# Patient Record
Sex: MALE | Race: BLACK OR AFRICAN AMERICAN | NOT HISPANIC OR LATINO | Employment: OTHER | URBAN - METROPOLITAN AREA
[De-identification: names, ages, dates, MRNs, and addresses within clinical notes are randomized per-mention and may not be internally consistent; named-entity substitution may affect disease eponyms.]

---

## 2023-10-07 ENCOUNTER — HOSPITAL ENCOUNTER (EMERGENCY)
Facility: HOSPITAL | Age: 43
Discharge: HOME | End: 2023-10-07
Attending: EMERGENCY MEDICINE
Payer: COMMERCIAL

## 2023-10-07 VITALS
WEIGHT: 189.6 LBS | BODY MASS INDEX: 26.54 KG/M2 | DIASTOLIC BLOOD PRESSURE: 75 MMHG | TEMPERATURE: 98.7 F | HEART RATE: 85 BPM | HEIGHT: 71 IN | RESPIRATION RATE: 19 BRPM | OXYGEN SATURATION: 98 % | SYSTOLIC BLOOD PRESSURE: 150 MMHG

## 2023-10-07 DIAGNOSIS — F16.10 PCP (PHENCYCLIDINE) ABUSE (MULTI): Primary | ICD-10-CM

## 2023-10-07 DIAGNOSIS — F32.A DEPRESSION, UNSPECIFIED DEPRESSION TYPE: ICD-10-CM

## 2023-10-07 LAB
ALBUMIN SERPL BCP-MCNC: 4.6 G/DL (ref 3.4–5)
ALP SERPL-CCNC: 68 U/L (ref 33–120)
ALT SERPL W P-5'-P-CCNC: 15 U/L (ref 10–52)
AMPHETAMINES UR QL SCN: ABNORMAL
ANION GAP SERPL CALC-SCNC: 12 MMOL/L (ref 10–20)
APAP SERPL-MCNC: <10 UG/ML
AST SERPL W P-5'-P-CCNC: 23 U/L (ref 9–39)
BARBITURATES UR QL SCN: ABNORMAL
BASOPHILS # BLD AUTO: 0.04 X10*3/UL (ref 0–0.1)
BASOPHILS NFR BLD AUTO: 0.7 %
BENZODIAZ UR QL SCN: ABNORMAL
BILIRUB SERPL-MCNC: 0.6 MG/DL (ref 0–1.2)
BUN SERPL-MCNC: 10 MG/DL (ref 6–23)
BZE UR QL SCN: ABNORMAL
CALCIUM SERPL-MCNC: 9.9 MG/DL (ref 8.6–10.6)
CANNABINOIDS UR QL SCN: ABNORMAL
CHLORIDE SERPL-SCNC: 102 MMOL/L (ref 98–107)
CO2 SERPL-SCNC: 28 MMOL/L (ref 21–32)
CREAT SERPL-MCNC: 1.2 MG/DL (ref 0.5–1.3)
EOSINOPHIL # BLD AUTO: 0.08 X10*3/UL (ref 0–0.7)
EOSINOPHIL NFR BLD AUTO: 1.5 %
ERYTHROCYTE [DISTWIDTH] IN BLOOD BY AUTOMATED COUNT: 12.4 % (ref 11.5–14.5)
ETHANOL SERPL-MCNC: <10 MG/DL
FENTANYL+NORFENTANYL UR QL SCN: ABNORMAL
GFR SERPL CREATININE-BSD FRML MDRD: 77 ML/MIN/1.73M*2
GLUCOSE SERPL-MCNC: 127 MG/DL (ref 74–99)
HCT VFR BLD AUTO: 43.9 % (ref 41–52)
HGB BLD-MCNC: 15.3 G/DL (ref 13.5–17.5)
IMM GRANULOCYTES # BLD AUTO: 0 X10*3/UL (ref 0–0.7)
IMM GRANULOCYTES NFR BLD AUTO: 0 % (ref 0–0.9)
LYMPHOCYTES # BLD AUTO: 2.69 X10*3/UL (ref 1.2–4.8)
LYMPHOCYTES NFR BLD AUTO: 50 %
MCH RBC QN AUTO: 31.9 PG (ref 26–34)
MCHC RBC AUTO-ENTMCNC: 34.9 G/DL (ref 32–36)
MCV RBC AUTO: 92 FL (ref 80–100)
MONOCYTES # BLD AUTO: 0.39 X10*3/UL (ref 0.1–1)
MONOCYTES NFR BLD AUTO: 7.2 %
NEUTROPHILS # BLD AUTO: 2.18 X10*3/UL (ref 1.2–7.7)
NEUTROPHILS NFR BLD AUTO: 40.6 %
NRBC BLD-RTO: 0 /100 WBCS (ref 0–0)
OPIATES UR QL SCN: ABNORMAL
OXYCODONE+OXYMORPHONE UR QL SCN: ABNORMAL
PCP UR QL SCN: ABNORMAL
PLATELET # BLD AUTO: 266 X10*3/UL (ref 150–450)
PMV BLD AUTO: 9.9 FL (ref 7.5–11.5)
POTASSIUM SERPL-SCNC: 4.4 MMOL/L (ref 3.5–5.3)
PROT SERPL-MCNC: 7.8 G/DL (ref 6.4–8.2)
RBC # BLD AUTO: 4.79 X10*6/UL (ref 4.5–5.9)
SALICYLATES SERPL-MCNC: <3 MG/DL
SARS-COV-2 RNA RESP QL NAA+PROBE: NOT DETECTED
SODIUM SERPL-SCNC: 138 MMOL/L (ref 136–145)
WBC # BLD AUTO: 5.4 X10*3/UL (ref 4.4–11.3)

## 2023-10-07 PROCEDURE — 80053 COMPREHEN METABOLIC PANEL: CPT | Performed by: PHYSICIAN ASSISTANT

## 2023-10-07 PROCEDURE — 99284 EMERGENCY DEPT VISIT MOD MDM: CPT | Performed by: EMERGENCY MEDICINE

## 2023-10-07 PROCEDURE — 80307 DRUG TEST PRSMV CHEM ANLYZR: CPT | Performed by: PHYSICIAN ASSISTANT

## 2023-10-07 PROCEDURE — 99283 EMERGENCY DEPT VISIT LOW MDM: CPT

## 2023-10-07 PROCEDURE — 87635 SARS-COV-2 COVID-19 AMP PRB: CPT | Performed by: EMERGENCY MEDICINE

## 2023-10-07 PROCEDURE — 36415 COLL VENOUS BLD VENIPUNCTURE: CPT | Performed by: PHYSICIAN ASSISTANT

## 2023-10-07 PROCEDURE — 80320 DRUG SCREEN QUANTALCOHOLS: CPT | Performed by: PHYSICIAN ASSISTANT

## 2023-10-07 PROCEDURE — 85025 COMPLETE CBC W/AUTO DIFF WBC: CPT | Performed by: PHYSICIAN ASSISTANT

## 2023-10-07 PROCEDURE — 80349 CANNABINOIDS NATURAL: CPT | Performed by: PHYSICIAN ASSISTANT

## 2023-10-07 PROCEDURE — 99285 EMERGENCY DEPT VISIT HI MDM: CPT | Performed by: PHYSICIAN ASSISTANT

## 2023-10-07 PROCEDURE — 83992 ASSAY FOR PHENCYCLIDINE: CPT | Performed by: PHYSICIAN ASSISTANT

## 2023-10-07 PROCEDURE — 2500000001 HC RX 250 WO HCPCS SELF ADMINISTERED DRUGS (ALT 637 FOR MEDICARE OP): Mod: SE | Performed by: PHYSICIAN ASSISTANT

## 2023-10-07 RX ORDER — LORAZEPAM 0.5 MG/1
0.5 TABLET ORAL EVERY 2 HOUR PRN
Status: DISCONTINUED | OUTPATIENT
Start: 2023-10-07 | End: 2023-10-07 | Stop reason: HOSPADM

## 2023-10-07 RX ORDER — FOLIC ACID 1 MG/1
1 TABLET ORAL DAILY
Status: DISCONTINUED | OUTPATIENT
Start: 2023-10-07 | End: 2023-10-07 | Stop reason: HOSPADM

## 2023-10-07 RX ORDER — MULTIVIT-MIN/IRON FUM/FOLIC AC 7.5 MG-4
1 TABLET ORAL DAILY
Status: DISCONTINUED | OUTPATIENT
Start: 2023-10-07 | End: 2023-10-07 | Stop reason: HOSPADM

## 2023-10-07 RX ORDER — LORAZEPAM 0.5 MG/1
1 TABLET ORAL EVERY 2 HOUR PRN
Status: DISCONTINUED | OUTPATIENT
Start: 2023-10-07 | End: 2023-10-07 | Stop reason: HOSPADM

## 2023-10-07 RX ORDER — LORAZEPAM 0.5 MG/1
2 TABLET ORAL EVERY 2 HOUR PRN
Status: DISCONTINUED | OUTPATIENT
Start: 2023-10-07 | End: 2023-10-07 | Stop reason: HOSPADM

## 2023-10-07 RX ADMIN — Medication 1 TABLET: at 18:24

## 2023-10-07 RX ADMIN — FOLIC ACID 1 MG: 1 TABLET ORAL at 18:25

## 2023-10-07 SDOH — HEALTH STABILITY: MENTAL HEALTH: SUICIDAL BEHAVIOR (DESCRIPTION): PATIENT SAID HE IS GOING TO JUMP IN THE LAKE

## 2023-10-07 SDOH — HEALTH STABILITY: MENTAL HEALTH: DEPRESSION SYMPTOMS: NO PROBLEMS REPORTED OR OBSERVED.

## 2023-10-07 SDOH — HEALTH STABILITY: MENTAL HEALTH: ANXIETY SYMPTOMS: NO PROBLEMS REPORTED OR OBSERVED.

## 2023-10-07 SDOH — HEALTH STABILITY: MENTAL HEALTH: IN THE PAST FEW WEEKS, HAVE YOU FELT THAT YOU OR YOUR FAMILY WOULD BE BETTER OFF IF YOU WERE DEAD?: YES

## 2023-10-07 SDOH — HEALTH STABILITY: MENTAL HEALTH: ACTIVE SUICIDAL IDEATION WITH SPECIFIC PLAN AND INTENT (PAST 1 MONTH): YES

## 2023-10-07 SDOH — HEALTH STABILITY: MENTAL HEALTH: SUICIDAL BEHAVIOR (LIFETIME): YES

## 2023-10-07 SDOH — HEALTH STABILITY: MENTAL HEALTH: HAVE YOU HAD THESE THOUGHTS AND HAD SOME INTENTION OF ACTING ON THEM?: YES

## 2023-10-07 SDOH — HEALTH STABILITY: MENTAL HEALTH: HAVE YOU ACTUALLY HAD ANY THOUGHTS OF KILLING YOURSELF?: YES

## 2023-10-07 SDOH — HEALTH STABILITY: MENTAL HEALTH: ACTIVE SUICIDAL IDEATION WITH SOME INTENT TO ACT, WITHOUT SPECIFIC PLAN (PAST 1 MONTH): YES

## 2023-10-07 SDOH — HEALTH STABILITY: MENTAL HEALTH: ARE YOU HAVING THOUGHTS OF KILLING YOURSELF RIGHT NOW?: YES

## 2023-10-07 SDOH — HEALTH STABILITY: MENTAL HEALTH: HAVE YOU WISHED YOU WERE DEAD OR WISHED YOU COULD GO TO SLEEP AND NOT WAKE UP?: YES

## 2023-10-07 SDOH — HEALTH STABILITY: MENTAL HEALTH: DESCRIBE YOUR THOUGHTS OF KILLING YOURSELF RIGHT NOW:: "I'M GOING TO JUMP IN THE LAKE"

## 2023-10-07 SDOH — HEALTH STABILITY: MENTAL HEALTH: IN THE PAST WEEK, HAVE YOU BEEN HAVING THOUGHTS ABOUT KILLING YOURSELF?: YES

## 2023-10-07 SDOH — HEALTH STABILITY: MENTAL HEALTH: HAVE YOU EVER TRIED TO KILL YOURSELF?: YES

## 2023-10-07 SDOH — HEALTH STABILITY: MENTAL HEALTH: SUICIDE ASSESSMENT: ADULT (C-SSRS)

## 2023-10-07 SDOH — HEALTH STABILITY: MENTAL HEALTH: WHEN DID YOU TRY TO KILL YOURSELF?: DID NOT SAY

## 2023-10-07 SDOH — HEALTH STABILITY: MENTAL HEALTH: NON-SPECIFIC ACTIVE SUICIDAL THOUGHTS (PAST 1 MONTH): YES

## 2023-10-07 SDOH — HEALTH STABILITY: MENTAL HEALTH
HAVE YOU STARTED TO WORK OUT OR WORKED OUT THE DETAILS OF HOW TO KILL YOURSELF? DO YOU INTENT TO CARRY OUT THIS PLAN?: NO

## 2023-10-07 SDOH — HEALTH STABILITY: MENTAL HEALTH: HALLUCINATION: AUDITORY

## 2023-10-07 SDOH — HEALTH STABILITY: MENTAL HEALTH: HAVE YOU EVER DONE ANYTHING, STARTED TO DO ANYTHING, OR PREPARED TO DO ANYTHING TO END YOUR LIFE?: NO

## 2023-10-07 SDOH — HEALTH STABILITY: MENTAL HEALTH: WISH TO BE DEAD (PAST 1 MONTH): YES

## 2023-10-07 SDOH — ECONOMIC STABILITY: HOUSING INSECURITY: FEELS SAFE LIVING IN HOME: NO

## 2023-10-07 SDOH — HEALTH STABILITY: MENTAL HEALTH: HAVE YOU BEEN THINKING ABOUT HOW YOU MIGHT DO THIS?: YES

## 2023-10-07 SDOH — HEALTH STABILITY: MENTAL HEALTH: IN THE PAST FEW WEEKS, HAVE YOU WISHED YOU WERE DEAD?: YES

## 2023-10-07 SDOH — HEALTH STABILITY: MENTAL HEALTH: SUICIDAL BEHAVIOR (3 MONTHS): YES

## 2023-10-07 SDOH — HEALTH STABILITY: MENTAL HEALTH: HOW DID YOU TRY TO KILL YOURSELF?: DID NOT SAY

## 2023-10-07 ASSESSMENT — LIFESTYLE VARIABLES
TOTAL SCORE: 0
EVER HAD A DRINK FIRST THING IN THE MORNING TO STEADY YOUR NERVES TO GET RID OF A HANGOVER: YES
TREMOR: 2
ORIENTATION AND CLOUDING OF SENSORIUM: ORIENTED AND CAN DO SERIAL ADDITIONS
NAUSEA AND VOMITING: NO NAUSEA AND NO VOMITING
HEADACHE, FULLNESS IN HEAD: NOT PRESENT
PAROXYSMAL SWEATS: NO SWEAT VISIBLE
VISUAL DISTURBANCES: NOT PRESENT
HAVE YOU EVER FELT YOU SHOULD CUT DOWN ON YOUR DRINKING: YES
PRESCIPTION_ABUSE_PAST_12_MONTHS: YES
EVER FELT BAD OR GUILTY ABOUT YOUR DRINKING: YES
HAVE PEOPLE ANNOYED YOU BY CRITICIZING YOUR DRINKING: YES
ORIENTATION AND CLOUDING OF SENSORIUM: ORIENTED AND CAN DO SERIAL ADDITIONS
ANXIETY: NO ANXIETY, AT EASE
AUDITORY DISTURBANCES: NOT PRESENT
SUBSTANCE_ABUSE_PAST_12_MONTHS: YES
HEADACHE, FULLNESS IN HEAD: NOT PRESENT
AGITATION: NORMAL ACTIVITY
NAUSEA AND VOMITING: NO NAUSEA AND NO VOMITING
AGITATION: NORMAL ACTIVITY
TOTAL SCORE: 3
ANXIETY: NO ANXIETY, AT EASE
TREMOR: NO TREMOR
PAROXYSMAL SWEATS: NO SWEAT VISIBLE
AUDITORY DISTURBANCES: VERY MILD HARSHNESS OR ABILITY TO FRIGHTEN
VISUAL DISTURBANCES: NOT PRESENT

## 2023-10-07 ASSESSMENT — COLUMBIA-SUICIDE SEVERITY RATING SCALE - C-SSRS
1. IN THE PAST MONTH, HAVE YOU WISHED YOU WERE DEAD OR WISHED YOU COULD GO TO SLEEP AND NOT WAKE UP?: YES
5. HAVE YOU STARTED TO WORK OUT OR WORKED OUT THE DETAILS OF HOW TO KILL YOURSELF? DO YOU INTEND TO CARRY OUT THIS PLAN?: YES
4. HAVE YOU HAD THESE THOUGHTS AND HAD SOME INTENTION OF ACTING ON THEM?: YES
2. HAVE YOU ACTUALLY HAD ANY THOUGHTS OF KILLING YOURSELF?: YES
6. HAVE YOU EVER DONE ANYTHING, STARTED TO DO ANYTHING, OR PREPARED TO DO ANYTHING TO END YOUR LIFE?: YES
6. HAVE YOU EVER DONE ANYTHING, STARTED TO DO ANYTHING, OR PREPARED TO DO ANYTHING TO END YOUR LIFE?: YES

## 2023-10-07 ASSESSMENT — PAIN - FUNCTIONAL ASSESSMENT: PAIN_FUNCTIONAL_ASSESSMENT: 0-10

## 2023-10-07 ASSESSMENT — PAIN SCALES - GENERAL: PAINLEVEL_OUTOF10: 0 - NO PAIN
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
## 2023-10-15 LAB
CARBOXYTHC UR-MCNC: 137 NG/ML
PCP UR-MCNC: >1000 NG/ML

## 2023-10-29 ENCOUNTER — HOSPITAL ENCOUNTER (EMERGENCY)
Facility: HOSPITAL | Age: 43
Discharge: LEFT BEFORE DISCHARGE | End: 2023-10-29

## 2023-10-29 PROCEDURE — 4500999001 HC ED NO CHARGE

## 2024-01-01 ENCOUNTER — HOSPITAL ENCOUNTER (EMERGENCY)
Facility: HOSPITAL | Age: 44
Discharge: HOME | End: 2024-01-01
Attending: EMERGENCY MEDICINE
Payer: COMMERCIAL

## 2024-01-01 VITALS
BODY MASS INDEX: 27.3 KG/M2 | DIASTOLIC BLOOD PRESSURE: 78 MMHG | WEIGHT: 195 LBS | RESPIRATION RATE: 16 BRPM | SYSTOLIC BLOOD PRESSURE: 122 MMHG | TEMPERATURE: 98.3 F | HEART RATE: 80 BPM | OXYGEN SATURATION: 99 % | HEIGHT: 71 IN

## 2024-01-01 DIAGNOSIS — F16.20: Primary | ICD-10-CM

## 2024-01-01 LAB
ALBUMIN SERPL BCP-MCNC: 4.5 G/DL (ref 3.4–5)
ALP SERPL-CCNC: 73 U/L (ref 33–120)
ALT SERPL W P-5'-P-CCNC: 19 U/L (ref 10–52)
AMPHETAMINES UR QL SCN: ABNORMAL
ANION GAP SERPL CALC-SCNC: 12 MMOL/L (ref 10–20)
APAP SERPL-MCNC: <10 UG/ML
AST SERPL W P-5'-P-CCNC: 26 U/L (ref 9–39)
BARBITURATES UR QL SCN: ABNORMAL
BASOPHILS # BLD AUTO: 0.03 X10*3/UL (ref 0–0.1)
BASOPHILS NFR BLD AUTO: 0.5 %
BENZODIAZ UR QL SCN: ABNORMAL
BILIRUB SERPL-MCNC: 0.7 MG/DL (ref 0–1.2)
BUN SERPL-MCNC: 15 MG/DL (ref 6–23)
BZE UR QL SCN: ABNORMAL
CALCIUM SERPL-MCNC: 9.9 MG/DL (ref 8.6–10.6)
CANNABINOIDS UR QL SCN: ABNORMAL
CHLORIDE SERPL-SCNC: 104 MMOL/L (ref 98–107)
CO2 SERPL-SCNC: 26 MMOL/L (ref 21–32)
CREAT SERPL-MCNC: 1.2 MG/DL (ref 0.5–1.3)
EOSINOPHIL # BLD AUTO: 0.06 X10*3/UL (ref 0–0.7)
EOSINOPHIL NFR BLD AUTO: 1.1 %
ERYTHROCYTE [DISTWIDTH] IN BLOOD BY AUTOMATED COUNT: 12.9 % (ref 11.5–14.5)
ETHANOL SERPL-MCNC: <10 MG/DL
FENTANYL+NORFENTANYL UR QL SCN: ABNORMAL
GFR SERPL CREATININE-BSD FRML MDRD: 77 ML/MIN/1.73M*2
GLUCOSE SERPL-MCNC: 80 MG/DL (ref 74–99)
HCT VFR BLD AUTO: 44.4 % (ref 41–52)
HGB BLD-MCNC: 16 G/DL (ref 13.5–17.5)
IMM GRANULOCYTES # BLD AUTO: 0.01 X10*3/UL (ref 0–0.7)
IMM GRANULOCYTES NFR BLD AUTO: 0.2 % (ref 0–0.9)
LYMPHOCYTES # BLD AUTO: 2.74 X10*3/UL (ref 1.2–4.8)
LYMPHOCYTES NFR BLD AUTO: 49.2 %
MCH RBC QN AUTO: 31.4 PG (ref 26–34)
MCHC RBC AUTO-ENTMCNC: 36 G/DL (ref 32–36)
MCV RBC AUTO: 87 FL (ref 80–100)
MONOCYTES # BLD AUTO: 0.52 X10*3/UL (ref 0.1–1)
MONOCYTES NFR BLD AUTO: 9.3 %
NEUTROPHILS # BLD AUTO: 2.21 X10*3/UL (ref 1.2–7.7)
NEUTROPHILS NFR BLD AUTO: 39.7 %
NRBC BLD-RTO: 0 /100 WBCS (ref 0–0)
OPIATES UR QL SCN: ABNORMAL
OXYCODONE+OXYMORPHONE UR QL SCN: ABNORMAL
PCP UR QL SCN: ABNORMAL
PLATELET # BLD AUTO: 271 X10*3/UL (ref 150–450)
POTASSIUM SERPL-SCNC: 4 MMOL/L (ref 3.5–5.3)
PROT SERPL-MCNC: 7.5 G/DL (ref 6.4–8.2)
RBC # BLD AUTO: 5.09 X10*6/UL (ref 4.5–5.9)
SALICYLATES SERPL-MCNC: <3 MG/DL
SARS-COV-2 RNA RESP QL NAA+PROBE: NOT DETECTED
SODIUM SERPL-SCNC: 138 MMOL/L (ref 136–145)
WBC # BLD AUTO: 5.6 X10*3/UL (ref 4.4–11.3)

## 2024-01-01 PROCEDURE — 99285 EMERGENCY DEPT VISIT HI MDM: CPT | Mod: 25

## 2024-01-01 PROCEDURE — 80179 DRUG ASSAY SALICYLATE: CPT | Performed by: EMERGENCY MEDICINE

## 2024-01-01 PROCEDURE — 80053 COMPREHEN METABOLIC PANEL: CPT | Performed by: EMERGENCY MEDICINE

## 2024-01-01 PROCEDURE — 99284 EMERGENCY DEPT VISIT MOD MDM: CPT | Mod: 25

## 2024-01-01 PROCEDURE — 87635 SARS-COV-2 COVID-19 AMP PRB: CPT

## 2024-01-01 PROCEDURE — 99285 EMERGENCY DEPT VISIT HI MDM: CPT | Performed by: EMERGENCY MEDICINE

## 2024-01-01 PROCEDURE — 36415 COLL VENOUS BLD VENIPUNCTURE: CPT | Performed by: EMERGENCY MEDICINE

## 2024-01-01 PROCEDURE — 85025 COMPLETE CBC W/AUTO DIFF WBC: CPT | Performed by: EMERGENCY MEDICINE

## 2024-01-01 PROCEDURE — 80307 DRUG TEST PRSMV CHEM ANLYZR: CPT | Performed by: EMERGENCY MEDICINE

## 2024-01-01 PROCEDURE — 93010 ELECTROCARDIOGRAM REPORT: CPT | Performed by: PHYSICIAN ASSISTANT

## 2024-01-01 SDOH — HEALTH STABILITY: MENTAL HEALTH: WHEN DID YOU TRY TO KILL YOURSELF?: "YEARS AGO" PER PATIENT REPORT.

## 2024-01-01 SDOH — HEALTH STABILITY: MENTAL HEALTH: ACTIVE SUICIDAL IDEATION WITH SOME INTENT TO ACT, WITHOUT SPECIFIC PLAN (PAST 1 MONTH): NO

## 2024-01-01 SDOH — HEALTH STABILITY: MENTAL HEALTH: DEPRESSION SYMPTOMS: CRYING

## 2024-01-01 SDOH — HEALTH STABILITY: MENTAL HEALTH: WISH TO BE DEAD (PAST 1 MONTH): YES

## 2024-01-01 SDOH — HEALTH STABILITY: MENTAL HEALTH: ARE YOU HAVING THOUGHTS OF KILLING YOURSELF RIGHT NOW?: NO

## 2024-01-01 SDOH — HEALTH STABILITY: MENTAL HEALTH: ANXIETY SYMPTOMS: GENERALIZED

## 2024-01-01 SDOH — HEALTH STABILITY: MENTAL HEALTH: SUICIDAL BEHAVIOR (3 MONTHS): NO

## 2024-01-01 SDOH — HEALTH STABILITY: MENTAL HEALTH: SUICIDAL BEHAVIOR (DESCRIPTION): HANGING AND CUTTING WRISTS

## 2024-01-01 SDOH — HEALTH STABILITY: MENTAL HEALTH: IN THE PAST FEW WEEKS, HAVE YOU WISHED YOU WERE DEAD?: YES

## 2024-01-01 SDOH — HEALTH STABILITY: MENTAL HEALTH: IN THE PAST FEW WEEKS, HAVE YOU FELT THAT YOU OR YOUR FAMILY WOULD BE BETTER OFF IF YOU WERE DEAD?: NO

## 2024-01-01 SDOH — HEALTH STABILITY: MENTAL HEALTH: SLEEP PATTERN: DIFFICULTY FALLING ASLEEP

## 2024-01-01 SDOH — HEALTH STABILITY: MENTAL HEALTH: HOW DID YOU TRY TO KILL YOURSELF?: CUTTING WRISTS AND HANGING

## 2024-01-01 SDOH — HEALTH STABILITY: MENTAL HEALTH: IN THE PAST WEEK, HAVE YOU BEEN HAVING THOUGHTS ABOUT KILLING YOURSELF?: YES

## 2024-01-01 SDOH — HEALTH STABILITY: MENTAL HEALTH: BEHAVIORS/MOOD: ANXIOUS;RESTLESS;TEARFUL

## 2024-01-01 SDOH — HEALTH STABILITY: MENTAL HEALTH: HAVE YOU EVER TRIED TO KILL YOURSELF?: YES

## 2024-01-01 SDOH — ECONOMIC STABILITY: GENERAL: FINANCIAL CONCERNS: UNABLE TO AFFORD FOOD;TRANSPORTATION COSTS

## 2024-01-01 SDOH — HEALTH STABILITY: MENTAL HEALTH: NON-SPECIFIC ACTIVE SUICIDAL THOUGHTS (PAST 1 MONTH): YES

## 2024-01-01 SDOH — ECONOMIC STABILITY: HOUSING INSECURITY: FEELS SAFE LIVING IN HOME: NO

## 2024-01-01 SDOH — HEALTH STABILITY: MENTAL HEALTH: ACTIVE SUICIDAL IDEATION WITH SPECIFIC PLAN AND INTENT (PAST 1 MONTH): NO

## 2024-01-01 SDOH — HEALTH STABILITY: MENTAL HEALTH: SUICIDAL BEHAVIOR (LIFETIME): YES

## 2024-01-01 SDOH — HEALTH STABILITY: MENTAL HEALTH: NEEDS EXPRESSED: DENIES

## 2024-01-01 SDOH — HEALTH STABILITY: MENTAL HEALTH: HALLUCINATION: AUDITORY

## 2024-01-01 ASSESSMENT — COLUMBIA-SUICIDE SEVERITY RATING SCALE - C-SSRS
6. HAVE YOU EVER DONE ANYTHING, STARTED TO DO ANYTHING, OR PREPARED TO DO ANYTHING TO END YOUR LIFE?: YES
6. HAVE YOU EVER DONE ANYTHING, STARTED TO DO ANYTHING, OR PREPARED TO DO ANYTHING TO END YOUR LIFE?: NO
1. IN THE PAST MONTH, HAVE YOU WISHED YOU WERE DEAD OR WISHED YOU COULD GO TO SLEEP AND NOT WAKE UP?: YES
4. HAVE YOU HAD THESE THOUGHTS AND HAD SOME INTENTION OF ACTING ON THEM?: YES
2. HAVE YOU ACTUALLY HAD ANY THOUGHTS OF KILLING YOURSELF?: YES
5. HAVE YOU STARTED TO WORK OUT OR WORKED OUT THE DETAILS OF HOW TO KILL YOURSELF? DO YOU INTEND TO CARRY OUT THIS PLAN?: YES

## 2024-01-01 ASSESSMENT — PAIN - FUNCTIONAL ASSESSMENT
PAIN_FUNCTIONAL_ASSESSMENT: 0-10
PAIN_FUNCTIONAL_ASSESSMENT: 0-10

## 2024-01-01 ASSESSMENT — LIFESTYLE VARIABLES
EVER FELT BAD OR GUILTY ABOUT YOUR DRINKING: NO
PRESCIPTION_ABUSE_PAST_12_MONTHS: NO
EVER HAD A DRINK FIRST THING IN THE MORNING TO STEADY YOUR NERVES TO GET RID OF A HANGOVER: NO
HAVE YOU EVER FELT YOU SHOULD CUT DOWN ON YOUR DRINKING: NO
SUBSTANCE_ABUSE_PAST_12_MONTHS: YES
REASON UNABLE TO ASSESS: NO
HAVE PEOPLE ANNOYED YOU BY CRITICIZING YOUR DRINKING: NO

## 2024-01-01 ASSESSMENT — PAIN SCALES - GENERAL
PAINLEVEL_OUTOF10: 0 - NO PAIN
PAINLEVEL_OUTOF10: 0 - NO PAIN
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
## 2024-01-02 ENCOUNTER — CLINICAL SUPPORT (OUTPATIENT)
Dept: EMERGENCY MEDICINE | Facility: HOSPITAL | Age: 44
End: 2024-01-02
Payer: COMMERCIAL

## 2024-01-02 PROCEDURE — 93005 ELECTROCARDIOGRAM TRACING: CPT

## 2024-01-02 NOTE — PROGRESS NOTES
EPAT - Social Work Psychiatric Assessment    Arrival Details  Mode of Arrival: Ambulatory  Admission Source: Home  Admission Type: Voluntary  EPAT Assessment Start Date: 01/01/24  EPAT Assessment Start Time: 1610  Name of : Lauryn Childs [facility]    History of Present Illness  Admission Reason: Psychiatric Evaluation  HPI: Patient, Shamar Cunningham, is a 43 year old male with history of schizophrenia, borderline personality disorder, anxiety, and PCP abuse. Patient presented to ED with complaint of suicidal ideation, auditory hallucinations, and paranoia. Patient reported auditory hallucinations were causing increased distress and increasing suicidal ideation with no plan. Patient appeared disorganized but redirect able in ED and tearful, per provider note. Patient reportedly requesting to go to [facility] when talking to provider. Patient denied experiencing homicidal ideation.     Patient’s chart, community record, provider note, triage note, labs, and C-SSRS score reviewed. Patient’s chart shows history of multiple EPAT assessments, inpatient psychiatric hospitalizations, and mental health diagnoses. Patient’s most recent EPAT assessment noted in October of 2023 with recommendation for discharge to [facility]. Patient’s most recent inpatient psychiatric hospitalization noted at [facility] from 08/17-08/22/2023 for suicidal ideation. Patient’s C-SSRS scored at “high risk” in triage.     Patient’s mother, Ann Cunningham ([phone]), contacted unsuccessfully. Phone number disconnected per messaging on call.    SW Readmission Information   Readmission within 30 Days: Yes  Previous ED Visit Date and Reason : 12/10/2023, Chest pain  Previous Discharge Date and Location: 12/10/2023, [facility]  Factors Contributing to  Readmission Inpatient/ED (Team Perspective): Homeless, Med Compliance/Difficulty Obtaining, Transportation Issues, Substance Abuse  Readmission Factors Team Comments: Chronic mental health issues, substance  use, medication/treatment non-compliance  Factors Contributing to Readmission (Patient/Family Perspective): Chronic mental health issues, substance use, and medication/treatment non-compliance.    Psychiatric Symptoms  Anxiety Symptoms: Generalized  Depression Symptoms: Crying  Rabia Symptoms: Flight of ideas, Poor judgment, Labile    Psychosis Symptoms  Hallucination Type: Auditory  Delusion Type: Paranoid    Additional Symptoms - Adult  Generalized Anxiety Disorder: Restlessness, Irritability, Excessive anxiety/worry  Obsessive Compulsive Disorder: No problems reported or observed.  Panic Attack: No problems reported or observed.  Post Traumatic Stress Disorder: No problems reported or observed.  Delirium: No problems reported or observed.  Review of Symptoms Comments: Patient discussed increased depression symptoms including suicidal thinking. Patient's chart records show history of patient having suicidal ideation at baseline. Patient reported history of suicide attempts years prior to current ED visit. Patient reported experiencing hallucinations telling patient to harm self, which seem present at baseline per chart history. Patient denied homicidal ideation.    Past Psychiatric History/Meds/Treatments  Past Psychiatric History: Kary has history of schizophrenia, borderline personality disorder, anxiety, and PCP abuse.  Past Psychiatric Meds/Treatments: Patient has history of treatment through [facility], per patient report. Patient reported history of use of Zyprexa, Seroquel, Buspar, Trazodone, and Prozac. Patient discussed medication non-compliance over the last few weeks. Per community records, patient reportedly compliant with medications as of 12/11/2023. Patient has history of multiple inpatient psychiatric hospitalizations with most recent noted at [facility] from 08/17-08/22/2023.  Past Violence/Victimization History: Patient's chart has violence risk indicator.    Current Mental Health  Contacts   Name/Phone Number: Unreported, previously through [facility].   Last Appointment Date: Unreported  Provider Name/Phone Number: Unreported, previously through [facility].  Provider Last Appointment Date: Unreported    Support System: Community, Friends, Extended family    Living Arrangement: Apartment    Home Safety  Feels Safe Living in Home: No  Potentially Unsafe Housing Conditions: Unable to Assess  Home Safety : Patient reported feeling paranoid about people coming into apartment and trying to kill patient due to friend and brother's death. Reports of friends/brother's deaths noted in several charts, unclear if thoughts are based in facts or paranoid delusion.    Income Information  Employment Status for: Patient  Employment Status: Disabled  Income Source: Disability  Current/Previous Occupation: Unable to Assess  Shift Worked:  (Unable to assess)  Income/Expense Information: Income meets expenses  Financial Concerns: Unable to Afford Food, Transportation Costs  Who Manages Finances if Patient Unable: Unreported  Employment/ Finance Comments: Patient reportedly utilizes disability income for needs.    [place] Service/Education History  Current or Previous  Service: None   Experience: Other (Comment) (None reported)  Education Level: High school  History of Learning Problems: No  History of School Behavior Problems: No  School History: Patient reported completing high school.    Social/Cultural History  Social History: Patient is a 43 year old  male with brown skin, brown hair, tattoos, wearing hospital gear. Patient appeared moderately groomed and close to stated age.  Cultural Requests During Hospitalization: None reported  Spiritual Requests During Hospitalization: None reported  Important Activities: Social    Legal  Legal Considerations: Patient/ Family Ability to Make Healthcare Decisions  Assistance with Managing/Advocating Healthcare Needs:  Other (Comment) (None reported)  Criminal Activity/ Legal Involvement Pertinent to Current Situation/ Hospitalization: None reported  Legal Concerns: None reported  Legal Comments: None reported    Drug Screening  Have you used any substances (canabis, cocaine, heroin, hallucinogens, inhalants, etc.) in the past 12 months?: Yes  Have you used any prescription drugs other than prescribed in the past 12 months?: No  Is a toxicology screen needed?: Yes    Stage of Change  Stage of Change: Precontemplation  History of Treatment: Inpatient, Dual, AA/NA meetings, Sober living  Type of Treatment Offered: AA/NA meeting resource, Other (Comment) ([facility])  Treatment Offered: Accepted  Duration of Substance Use: Unreported  Frequency of Substance Use: Unreported  Age of First Substance Use: Unreported    Psychosocial  Psychosocial (WDL): Exceptions to WDL  Behaviors/Mood: Calm, Flight of ideas, Labile, Pleasant, Verbal  Affect: Inconsistent with thought content  Parent/Guardian/Significant Other Involvement: No involvement  Family Behaviors: Unable to assess  Visitor Behaviors: Unable to assess  Needs Expressed: Denies  Emotional Support Given:  (None provided)    Orientation  Orientation Level: Oriented X4    General Appearance  Motor Activity: Unremarkable  Speech Pattern: Repetitive  General Attitude: Cooperative, Pleasant  Appearance/Hygiene: Poor hygiene    Thought Process  Coherency: Circumstantial  Content: Blaming others, Delusions  Delusions: Paranoid  Perception: Hallucinations  Hallucination: Auditory  Judgment/Insight: Limited  Confusion: None  Cognition: Impulsive, Poor judgement, Poor safety awareness    Sleep Pattern  Sleep Pattern: Unable to assess    Risk Factors  Self Harm/Suicidal Ideation Plan: Patient reported suicidal ideation with auditory hallucinations telling patient to jump in front of a bus. Patient's chart history notes baseline suicidal ideation.  Previous Self Harm/Suicidal Plans: Patient  "discussed remote history of suicide attempts via cutting wrists and hanging.  Risk Factors: Lower socioeconomic status, Major mental illness, Male  Description of Thoughts/Ideas Leaving Unit Now: Patient reported desire to go to [place] or [place]    Violence Risk Assessment  Assessment of Violence: None noted  Thoughts of Harm to Others: No    Ability to Assess Risk Screen  Risk Screen - Ability to Assess: Able to be screened  Ask Suicide-Screening Questions  1. In the past few weeks, have you wished you were dead?: Yes  2. In the past few weeks, have you felt that you or your family would be better off if you were dead?: No  3. In the past week, have you been having thoughts about killing yourself?: Yes  4. Have you ever tried to kill yourself?: Yes  How did you try to kill yourself?: Cutting wrists and hanging  When did you try to kill yourself?: \"Years ago\" per patient report.  5. Are you having thoughts of killing yourself right now?: No  Calculated Risk Score: Potential Risk  [place] Suicide Severity Rating Scale (Screener/Recent Self-Report)  1. Wish to be Dead (Past 1 Month): Yes  2. Non-Specific Active Suicidal Thoughts (Past 1 Month): Yes  3. Active Suicidal Ideation with any Methods (Not Plan) Without Intent to Act (Past 1 Month): Yes  4. Active Suicidal Ideation with Some Intent to Act, Without Specific Plan (Past 1 Month): No  5. Active Suicidal Ideation with Specific Plan and Intent (Past 1 Month): No  6. Suicidal Behavior (Lifetime): Yes  6. Suicidal Behavior (3 Months): No  6. Suicidal Behavior (Description): Hanging and cutting wrists  Calculated C-SSRS Risk Score (Lifetime/Recent): Moderate Risk  Step 1: Risk Factors  Current & Past Psychiatric Dx: Mood disorder, Psychotic disorder, Alcohol/substance abuse disorders, Cluster B personality disorders or traits (i.e., borderline, antisocial, histrionic & narcissistic)  Presenting Symptoms: Impulsivity  Family History: Other (Comment) " (Unreported)  Precipitants/Stressors: Inadequate social supports, Substance intoxication or withdrawal  Change in Treatment: Non-compliant or not receiving treatment  Access to Lethal Methods : No  Step 2: Protective Factors   Protective Factors Internal: Frustration tolerance, Identifies reasons for living  Protective Factors External: Responsibility to children, Supportive social network or family or friends  Step 3: Suicidal Ideation Intensity  Most Severe Suicidal Ideation Identified: Patient reported suicidal ideation with auditory hallucinations telling patient to jump in front of a bus. Patient's chart history notes baseline suicidal ideation.  How Many Times Have You Had These Thoughts: Once a week  When You Have the Thoughts How Long do They Last : Less than 1 hour/some of the time  Could/Can You Stop Thinking About Killing Yourself or Wanting to Die if You Want to: Can control thoughts with little difficulty  Are There Things - Anyone or Anything - That Stopped You From Wanting to Die or Acting on: Deterrents probably stopped you  What Sort of Reasons Did You Have For Thinking About Wanting to Die or Killing Yourself: Does not apply  Total Score: 8  Step 5: Documentation  Risk Level: Moderate suicide risk    Psychiatric Impression and Plan of Care  Assessment and Plan: Patient, Shamar Cunningham, is a 43 year old male with history of schizophrenia, borderline personality disorder, anxiety, and PCP abuse. Patient presented to ED with complaint of suicidal ideation, auditory hallucinations, and paranoia. Patient reported auditory hallucinations were causing increased distress and increasing suicidal ideation with no plan. Patient discussed reason for ED visit stating “I was hearing voices tell me to jump in front of a bus. I don’t want to do what they are saying. I want to live to watch my kids grow up. I want to go to [place]. I want to get back on my meds and go to [facility]”. Patient discussed  experiencing auditory hallucinations telling patient to hurt self. Patient’s chart history shows many notations of patient’s baseline suicidal ideation. Patient reported no intention to act on thoughts when asked. Patient’s C-SSRS scored at moderate risk due to report of commanding auditory hallucinations and history of suicide attempts. Patient’s overall lifetime risk remains elevated due to history of attempts. Patient denied experiencing homicidal ideation. Patient discussed recent substance use. Patient reported “they gave them to me and they told me they were my medicine. I took them because they were my medicine”. Patient reported belief that marijuana and PCP were medicine. Patient’s UDS resulted with positive values for marijuana and PCP which could impact patient’s overall functioning and influence suicidal ideation, paranoia, and hallucinations. Patient discussed some paranoia that people who killed a friend and brother would come after patient. Patient reported desire to connect with [facility] for possible treatment in rehab and sober support. Patient able to identify supportive people including patient’s mother and family members. Patient able to identify reason for living being “I want to see my kids grow up. I want to be a father to them”. Patient appeared future and goal oriented with voicing desire to re-connect with [facility] services, where patient was previously receiving care before providers quit; and voicing goal to be around to help raise patient’s children. Patient appeared to be exaggerating symptoms during assessment with notable dramatic crying/tears with no actual tear production, focus on going to specific treatment centers, and smiling/laughing observed. Patient appears to be at baseline functioning during assessment and does not currently meet criteria for inpatient psychiatric hospitalization. Patient encouraged to follow up with [facility], call 9-1-1, call crisis hotline, and return to  ED if symptoms worsen. Patient recommended for discharge to [facility] for sober supports. Plan for care discussed with and approve by Dr. Casarez.     Specific Resources Provided to Patient: Patient encouraged to follow up with [facility], call 9-1-1, call crisis hotline, and return to ED if symptoms worsen. Patient connected with [facility] for sober supports.  CM Notified: -  PHP/IOP Recommended: Not at this time  Specific Information Provided for PHP/IOP: None at this time  Plan Comments: Diagnosis: Schizophrenia and substance use disorder.    Outcome/Disposition  Patient's Perception of Outcome Achieved: Patient voicing desire to go to [place] and [facility].  Assessment, Recommendations and Risk Level Reviewed with: Dr. Casarez  Contact Name: Ann Octavio  Contact Number(s): [phone]  Contact Relationship: Patient's mother  EPAT Assessment Completed Date: 01/01/24  EPAT Assessment Completed Time: 1936  Patient Disposition: Home

## 2024-01-03 LAB
ATRIAL RATE: 69 BPM
P AXIS: 75 DEGREES
P OFFSET: 206 MS
P ONSET: 159 MS
PR INTERVAL: 120 MS
Q ONSET: 219 MS
QRS COUNT: 11 BEATS
QRS DURATION: 86 MS
QT INTERVAL: 346 MS
QTC CALCULATION(BAZETT): 370 MS
QTC FREDERICIA: 362 MS
R AXIS: 59 DEGREES
T AXIS: 29 DEGREES
T OFFSET: 392 MS
VENTRICULAR RATE: 69 BPM

## 2024-01-14 ENCOUNTER — CLINICAL SUPPORT (OUTPATIENT)
Dept: EMERGENCY MEDICINE | Facility: HOSPITAL | Age: 44
End: 2024-01-14
Payer: COMMERCIAL

## 2024-01-14 ENCOUNTER — HOSPITAL ENCOUNTER (EMERGENCY)
Facility: HOSPITAL | Age: 44
Discharge: HOME | End: 2024-01-14
Attending: EMERGENCY MEDICINE
Payer: COMMERCIAL

## 2024-01-14 VITALS
TEMPERATURE: 97.9 F | OXYGEN SATURATION: 97 % | HEART RATE: 84 BPM | SYSTOLIC BLOOD PRESSURE: 124 MMHG | RESPIRATION RATE: 17 BRPM | DIASTOLIC BLOOD PRESSURE: 77 MMHG

## 2024-01-14 DIAGNOSIS — R45.851 SUICIDAL THOUGHTS: Primary | ICD-10-CM

## 2024-01-14 LAB
ALBUMIN SERPL BCP-MCNC: 3.9 G/DL (ref 3.4–5)
ALP SERPL-CCNC: 57 U/L (ref 33–120)
ALT SERPL W P-5'-P-CCNC: 20 U/L (ref 10–52)
ANION GAP SERPL CALC-SCNC: 11 MMOL/L (ref 10–20)
APAP SERPL-MCNC: <10 UG/ML
AST SERPL W P-5'-P-CCNC: 22 U/L (ref 9–39)
ATRIAL RATE: 72 BPM
BASOPHILS # BLD AUTO: 0.04 X10*3/UL (ref 0–0.1)
BASOPHILS NFR BLD AUTO: 0.6 %
BILIRUB SERPL-MCNC: 0.3 MG/DL (ref 0–1.2)
BUN SERPL-MCNC: 16 MG/DL (ref 6–23)
CALCIUM SERPL-MCNC: 9.4 MG/DL (ref 8.6–10.6)
CHLORIDE SERPL-SCNC: 105 MMOL/L (ref 98–107)
CO2 SERPL-SCNC: 27 MMOL/L (ref 21–32)
CREAT SERPL-MCNC: 1.12 MG/DL (ref 0.5–1.3)
EGFRCR SERPLBLD CKD-EPI 2021: 84 ML/MIN/1.73M*2
EOSINOPHIL # BLD AUTO: 0.11 X10*3/UL (ref 0–0.7)
EOSINOPHIL NFR BLD AUTO: 1.8 %
ERYTHROCYTE [DISTWIDTH] IN BLOOD BY AUTOMATED COUNT: 13 % (ref 11.5–14.5)
ETHANOL SERPL-MCNC: <10 MG/DL
GLUCOSE SERPL-MCNC: 106 MG/DL (ref 74–99)
HCT VFR BLD AUTO: 39.1 % (ref 41–52)
HGB BLD-MCNC: 13.9 G/DL (ref 13.5–17.5)
HOLD SPECIMEN: NORMAL
HOLD SPECIMEN: NORMAL
IMM GRANULOCYTES # BLD AUTO: 0.01 X10*3/UL (ref 0–0.7)
IMM GRANULOCYTES NFR BLD AUTO: 0.2 % (ref 0–0.9)
LYMPHOCYTES # BLD AUTO: 2.9 X10*3/UL (ref 1.2–4.8)
LYMPHOCYTES NFR BLD AUTO: 46.9 %
MCH RBC QN AUTO: 31.1 PG (ref 26–34)
MCHC RBC AUTO-ENTMCNC: 35.5 G/DL (ref 32–36)
MCV RBC AUTO: 88 FL (ref 80–100)
MONOCYTES # BLD AUTO: 0.53 X10*3/UL (ref 0.1–1)
MONOCYTES NFR BLD AUTO: 8.6 %
NEUTROPHILS # BLD AUTO: 2.59 X10*3/UL (ref 1.2–7.7)
NEUTROPHILS NFR BLD AUTO: 41.9 %
NRBC BLD-RTO: 0.3 /100 WBCS (ref 0–0)
P AXIS: 67 DEGREES
P OFFSET: 201 MS
P ONSET: 157 MS
PLATELET # BLD AUTO: 251 X10*3/UL (ref 150–450)
POTASSIUM SERPL-SCNC: 4.3 MMOL/L (ref 3.5–5.3)
PR INTERVAL: 126 MS
PROT SERPL-MCNC: 6.4 G/DL (ref 6.4–8.2)
Q ONSET: 220 MS
QRS COUNT: 12 BEATS
QRS DURATION: 84 MS
QT INTERVAL: 338 MS
QTC CALCULATION(BAZETT): 370 MS
QTC FREDERICIA: 359 MS
R AXIS: 76 DEGREES
RBC # BLD AUTO: 4.47 X10*6/UL (ref 4.5–5.9)
SALICYLATES SERPL-MCNC: <3 MG/DL
SARS-COV-2 RNA RESP QL NAA+PROBE: NOT DETECTED
SODIUM SERPL-SCNC: 139 MMOL/L (ref 136–145)
T AXIS: 36 DEGREES
T OFFSET: 389 MS
VENTRICULAR RATE: 72 BPM
WBC # BLD AUTO: 6.2 X10*3/UL (ref 4.4–11.3)

## 2024-01-14 PROCEDURE — 80053 COMPREHEN METABOLIC PANEL: CPT | Performed by: STUDENT IN AN ORGANIZED HEALTH CARE EDUCATION/TRAINING PROGRAM

## 2024-01-14 PROCEDURE — 87635 SARS-COV-2 COVID-19 AMP PRB: CPT | Performed by: STUDENT IN AN ORGANIZED HEALTH CARE EDUCATION/TRAINING PROGRAM

## 2024-01-14 PROCEDURE — 36415 COLL VENOUS BLD VENIPUNCTURE: CPT | Performed by: STUDENT IN AN ORGANIZED HEALTH CARE EDUCATION/TRAINING PROGRAM

## 2024-01-14 PROCEDURE — 80143 DRUG ASSAY ACETAMINOPHEN: CPT | Performed by: STUDENT IN AN ORGANIZED HEALTH CARE EDUCATION/TRAINING PROGRAM

## 2024-01-14 PROCEDURE — 93005 ELECTROCARDIOGRAM TRACING: CPT

## 2024-01-14 PROCEDURE — 85025 COMPLETE CBC W/AUTO DIFF WBC: CPT | Performed by: STUDENT IN AN ORGANIZED HEALTH CARE EDUCATION/TRAINING PROGRAM

## 2024-01-14 PROCEDURE — 99285 EMERGENCY DEPT VISIT HI MDM: CPT | Performed by: EMERGENCY MEDICINE

## 2024-01-14 SDOH — HEALTH STABILITY: MENTAL HEALTH: IN THE PAST FEW WEEKS, HAVE YOU FELT THAT YOU OR YOUR FAMILY WOULD BE BETTER OFF IF YOU WERE DEAD?: NO

## 2024-01-14 SDOH — HEALTH STABILITY: MENTAL HEALTH: NON-SPECIFIC ACTIVE SUICIDAL THOUGHTS (PAST 1 MONTH): YES

## 2024-01-14 SDOH — HEALTH STABILITY: MENTAL HEALTH: ANXIETY SYMPTOMS: NO PROBLEMS REPORTED OR OBSERVED.

## 2024-01-14 SDOH — HEALTH STABILITY: MENTAL HEALTH: SUICIDAL BEHAVIOR (LIFETIME): NO

## 2024-01-14 SDOH — HEALTH STABILITY: MENTAL HEALTH: ARE YOU HAVING THOUGHTS OF KILLING YOURSELF RIGHT NOW?: NO

## 2024-01-14 SDOH — ECONOMIC STABILITY: HOUSING INSECURITY: FEELS SAFE LIVING IN HOME: OTHER (COMMENT)

## 2024-01-14 SDOH — HEALTH STABILITY: MENTAL HEALTH: ACTIVE SUICIDAL IDEATION WITH SPECIFIC PLAN AND INTENT (PAST 1 MONTH): YES

## 2024-01-14 SDOH — HEALTH STABILITY: MENTAL HEALTH: SUICIDAL BEHAVIOR (3 MONTHS): YES

## 2024-01-14 SDOH — HEALTH STABILITY: MENTAL HEALTH: IN THE PAST WEEK, HAVE YOU BEEN HAVING THOUGHTS ABOUT KILLING YOURSELF?: NO

## 2024-01-14 SDOH — ECONOMIC STABILITY: GENERAL

## 2024-01-14 SDOH — HEALTH STABILITY: MENTAL HEALTH: HAVE YOU EVER TRIED TO KILL YOURSELF?: NO

## 2024-01-14 SDOH — HEALTH STABILITY: MENTAL HEALTH: IN THE PAST FEW WEEKS, HAVE YOU WISHED YOU WERE DEAD?: YES

## 2024-01-14 SDOH — HEALTH STABILITY: MENTAL HEALTH: ACTIVE SUICIDAL IDEATION WITH SOME INTENT TO ACT, WITHOUT SPECIFIC PLAN (PAST 1 MONTH): YES

## 2024-01-14 SDOH — HEALTH STABILITY: MENTAL HEALTH: WISH TO BE DEAD (PAST 1 MONTH): YES

## 2024-01-14 SDOH — HEALTH STABILITY: MENTAL HEALTH: DEPRESSION SYMPTOMS: NO PROBLEMS REPORTED OR OBSERVED.

## 2024-01-14 ASSESSMENT — COLUMBIA-SUICIDE SEVERITY RATING SCALE - C-SSRS
1. IN THE PAST MONTH, HAVE YOU WISHED YOU WERE DEAD OR WISHED YOU COULD GO TO SLEEP AND NOT WAKE UP?: YES
6. HAVE YOU EVER DONE ANYTHING, STARTED TO DO ANYTHING, OR PREPARED TO DO ANYTHING TO END YOUR LIFE?: YES
5. HAVE YOU STARTED TO WORK OUT OR WORKED OUT THE DETAILS OF HOW TO KILL YOURSELF? DO YOU INTEND TO CARRY OUT THIS PLAN?: YES
6. HAVE YOU EVER DONE ANYTHING, STARTED TO DO ANYTHING, OR PREPARED TO DO ANYTHING TO END YOUR LIFE?: NO
2. HAVE YOU ACTUALLY HAD ANY THOUGHTS OF KILLING YOURSELF?: YES
4. HAVE YOU HAD THESE THOUGHTS AND HAD SOME INTENTION OF ACTING ON THEM?: YES

## 2024-01-14 ASSESSMENT — LIFESTYLE VARIABLES
PRESCIPTION_ABUSE_PAST_12_MONTHS: NO
EVER HAD A DRINK FIRST THING IN THE MORNING TO STEADY YOUR NERVES TO GET RID OF A HANGOVER: NO
EVER FELT BAD OR GUILTY ABOUT YOUR DRINKING: NO
SUBSTANCE_ABUSE_PAST_12_MONTHS: NO
HAVE PEOPLE ANNOYED YOU BY CRITICIZING YOUR DRINKING: NO
REASON UNABLE TO ASSESS: NO
HAVE YOU EVER FELT YOU SHOULD CUT DOWN ON YOUR DRINKING: NO

## 2024-01-14 ASSESSMENT — PAIN - FUNCTIONAL ASSESSMENT: PAIN_FUNCTIONAL_ASSESSMENT: 0-10

## 2024-01-14 ASSESSMENT — PAIN SCALES - GENERAL: PAINLEVEL_OUTOF10: 0 - NO PAIN

## 2024-01-14 NOTE — ED PROVIDER NOTES
CC: Suicidal     HPI:  Patient is a 43-year-old male with PMH of schizoaffective disorder, and polysubstance abuse presenting to the ED with suicidal ideation.  Patient states he ran out of his medications and has been having worsening auditory hallucinations commanding him to end his life.  States he had a plan to jump in front of the bus.  States he would like to go back to [facility] as he does not feel safe in his current housing situation.  Denies any medical or traumatic complaints at this time.      Limitations to History: None    Records Reviewed:  Recent available ED and inpatient notes reviewed in EMR.    PMHx/PSHx:  Per HPI.   - has a past medical history of Other conditions influencing health status (07/20/2013), Other conditions influencing health status, Pain in unspecified ankle and joints of unspecified foot, Personal history of other diseases of the nervous system and sense organs (07/10/2015), and Personal history of other specified conditions.  - has a past surgical history that includes Other surgical history (07/10/2015) and Other surgical history (07/10/2015).    Medications:  Reviewed in EMR. See EMR for complete list of medications and doses.    Allergies:  Ibuprofen and Gluten protein    Social History:  - Tobacco:  has no history on file for tobacco use.   - Alcohol:  has no history on file for alcohol use.   - Illicit Drugs:  has no history on file for drug use.     ROS:  Per HPI.     ???????????????????????????????????????????????????????????????  Triage Vitals:  T 36.6 °C (97.8 °F)  HR 77  /87  RR 16  O2 97 %      PHYSICAL EXAM:   VS: As documented in the triage note and EMR flowsheet from this visit were reviewed.  Gen: Well developed.  Appears comfortable.  Eyes: pupils equally round, Clear scerla.  HENT: NC/AT, Mucosal membranes moist.   Neck: Supple, tracheal midline  Resp: Non-labored breathing on RA, no stridor  CV: regular rate, extremities well perfused  Abd:  Soft, non-distended, non-tender  Skin: WWP. No systemic rashes or lesions.  MSK: normal muscle bulk, no obvious joint swelling in extremities  Neuro:  AAOx3, speech fluent, MAEx4, no facial droop  Psych: Appropriate mood and affect, calm, cooperative  ???????????????????????????????????????????????????????????????    ED Labs/Imaging:   Labs Reviewed   CBC WITH AUTO DIFFERENTIAL - Abnormal       Result Value    WBC 6.2      nRBC 0.3 (*)     RBC 4.47 (*)     Hemoglobin 13.9      Hematocrit 39.1 (*)     MCV 88      MCH 31.1      MCHC 35.5      RDW 13.0      Platelets 251      Neutrophils % 41.9      Immature Granulocytes %, Automated 0.2      Lymphocytes % 46.9      Monocytes % 8.6      Eosinophils % 1.8      Basophils % 0.6      Neutrophils Absolute 2.59      Immature Granulocytes Absolute, Automated 0.01      Lymphocytes Absolute 2.90      Monocytes Absolute 0.53      Eosinophils Absolute 0.11      Basophils Absolute 0.04     COMPREHENSIVE METABOLIC PANEL - Abnormal    Glucose 106 (*)     Sodium 139      Potassium 4.3      Chloride 105      Bicarbonate 27      Anion Gap 11      Urea Nitrogen 16      Creatinine 1.12      eGFR 84      Calcium 9.4      Albumin 3.9      Alkaline Phosphatase 57      Total Protein 6.4      AST 22      Bilirubin, Total 0.3      ALT 20     ACUTE TOXICOLOGY PANEL, BLOOD - Normal    Acetaminophen <10.0      Salicylate  <3      Alcohol <10     SARS-COV-2 PCR, SCREEN ASYMPTOMATIC - Normal    Coronavirus 2019, PCR Not Detected      Narrative:     This assay has received FDA Emergency Use Authorization (EUA) and is only authorized for the duration of time that circumstances exist to justify the authorization of the emergency use of in vitro diagnostic tests for the detection of SARS-CoV-2 virus and/or diagnosis of COVID-19 infection under section 564(b)(1) of the Act, 21 U.S.C. 360bbb-3(b)(1). This assay is an in vitro diagnostic nucleic acid amplification test for the qualitative detection of  SARS-CoV-2 from nasopharyngeal specimens and has been validated for use at [facility]. Negative results do not preclude COVID-19 infections and should not be used as the sole basis for diagnosis, treatment, or other management decisions.     DRUG SCREEN,URINE     No orders to display         ED Course & MDM   ED Course as of 01/15/24 1738   Sun Jan 14, 2024   1836 Patient was discussed with the EPAT.  Per their report, when patient does not have a place to stay and uses certain substances he states he is going to kill himself.  Do not believe that he has actual intent and patient appears happy calm cooperative.  Has a safe place to go with his girlfriend. [KR]      ED Course User Index  [KR] Chloe Collins MD         Diagnoses as of 01/15/24 1738   Suicidal thoughts           Medical Decision Making  This is a 43-year-old male with history of polysubstance abuse and schizoaffective disorder presenting to the ED requesting housing and endorsing suicidal thoughts.  Medical clearance exam initiated and EPAT was consulted.  Patient was discussed with the EPAT.  Per their report, when patient does not have a place to stay and uses certain substances he states he is going to kill himself.  Do not believe that he has actual intent and patient appears happy calm cooperative. Goal oriented with protective factors. Has a safe place to go with his girlfriend.  Patient was given close outpatient psychiatric resources and follow-up.  This plan was discussed with attending physician Dr. Damon and patient was discharged.    Social Determinants Limiting Care:  Mental health issues    Disposition:  As a result of the work-up, patient was discharged home.  They were informed of their diagnosis and instructed to come back with any concerns or worsening of condition and was agreeable to the plan as discussed above.  The patient was given the opportunity to ask questions.  All of the patient's questions were  answered.  The patient remained stable under my care.    Patient seen and discussed with attending physician.    Chloe Collins MD PGY3  Emergency Medicine      Procedures ? [employer] last updated 1/14/2024 4:39 PM          Chloe Collins MD  Resident  01/15/24 [id number]

## 2024-01-14 NOTE — ED TRIAGE NOTES
Pt to ED via EMS. Pt was found wanting to step in front of traffic. Pt is actively suicidal and has a plan. Pt states that he doesn't feel safe at home and would like to like to be admitted to [facility]. Pt states he has attempted suicide in the past by cutting wrists.

## 2024-01-15 NOTE — PROGRESS NOTES
EPAT - Social Work Psychiatric Assessment    Arrival Details  Mode of Arrival: Ambulance  Admission Source: Other (Comment) (community)  Admission Type: Voluntary  EPAT Assessment Start Date: 01/14/24  EPAT Assessment Start Time: 1700  Name of : KENNETH Sue MSSA    History of Present Illness  Admission Reason: Psych Eval  HPI: Patient is 42yo presenting to ED for psych eval. Reviewed chart hx and provider note prior to assssment. patient has mh hx of schizophrenia, BPD  and anxiety. Patient chart hx reflects non-compliance with meds and has a hx of inpatient hospitals. Patient BIB ems after reporting he was going to walk into traffic. [facility]. Endorsed AH that commanded harm to self but denies this now. Refused to drop urine during assessment though urine pending now.    SW Readmission Information   Readmission within 30 Days: No    Psychiatric Symptoms  Anxiety Symptoms: No problems reported or observed.  Depression Symptoms: No problems reported or observed.  Rabia Symptoms: Flight of ideas, Poor judgment    Psychosis Symptoms  Hallucination Type: Auditory  Delusion Type: No problems reported or observed.    Additional Symptoms - Adult  Generalized Anxiety Disorder: No problems reported or observed.  Obsessive Compulsive Disorder: No problems reported or observed.  Panic Attack: No problems reported or observed.  Post Traumatic Stress Disorder: No problems reported or observed.  Delirium: No problems reported or observed.  Review of Symptoms Comments: Patient presents suicidal with a plan to walk into traffic, though denying currently.    Past Psychiatric History/Meds/Treatments  Past Psychiatric History: Schizoaffective disorder, per chart review BPD. anxiety, and drug abuse.  Past Psychiatric Meds/Treatments: Patient listed Seroquel, Buspar, trazodone, Prozac  Past Violence/Victimization History: none reported    Current Mental Health  Contacts   Name/Phone Number: [facility]   Last Appointment Date: unknown  Provider Name/Phone Number: unknown  Provider Last Appointment Date: unknown    Support System: Friends    Living Arrangement: Lives with someone    Home Safety  Feels Safe Living in Home: Other (Comment) (unknown)  Potentially Unsafe Housing Conditions: Unable to Assess  Home Safety : unknwon    Income Information  Employment Status for: Patient  Employment Status: Disabled  Income Source: Disability  Current/Previous Occupation: Unable to Assess  Shift Worked:  (unreported)  Income/Expense Information:  (unknwon)  Financial Concerns: None  Who Manages Finances if Patient Unable: patient  Employment/ Finance Comments: unreported    [place] Service/Education History  Current or Previous  Service: None   Experience: Other (Comment) (none)  Education Level: Less than high school  History of Learning Problems: No  History of School Behavior Problems: No  School History: unreported.    Social/Cultural History  Social History: Patient is 44yo AA male. slightly ungroomed hair, close to stated age  Cultural Requests During Hospitalization: none  Spiritual Requests During Hospitalization: none  Important Activities: Social    Legal  Legal Considerations: Patient/ Family Ability to Make Healthcare Decisions  Assistance with Managing/Advocating Healthcare Needs: Other (Comment) (none)  Criminal Activity/ Legal Involvement Pertinent to Current Situation/ Hospitalization: none  Legal Concerns: none  Legal Comments: none    Drug Screening  Have you used any substances (canabis, cocaine, heroin, hallucinogens, inhalants, etc.) in the past 12 months?: No (Patient denies use, but refused to do drug screen)  Have you used any prescription drugs other than prescribed in the past 12 months?: No  Is a toxicology screen needed?: Yes    Stage of Change  Stage of Change: Precontemplation  History of  Treatment: Other (Comment) (patient denied, chart hx reports dual,inpatient)  Type of Treatment Offered: Other (Comment) (patient denied, chart hx reports dual,inpatient)  Treatment Offered: Declined  Duration of Substance Use: unreported  Frequency of Substance Use: unreported  Age of First Substance Use: unreported    Psychosocial  Psychosocial (WDL): Within Defined Limits    Orientation  Orientation Level: Disoriented to place, Disoriented to time, Disoriented to situation    General Appearance  Motor Activity: Unremarkable  Speech Pattern: Excessively loud, Rapid, Pressured  General Attitude: Cooperative, Suspicious  Appearance/Hygiene: Unremarkable    Thought Process  Coherency: Flight of ideas, Disorganized  Content: Unremarkable  Delusions: Paranoid  Perception: Not altered  Hallucination: Auditory, Command, Other (Comment) (Patient reported ACH however denies this now)  Judgment/Insight: Poor  Confusion: Mild  Cognition: Poor judgement    Sleep Pattern  Sleep Pattern: Unable to assess    Risk Factors  Self Harm/Suicidal Ideation Plan: Patient reports SI with plan at baseline  Previous Self Harm/Suicidal Plans: Cutting  Risk Factors: Male, Major mental illness  Description of Thoughts/Ideas Leaving Unit Now: Patient reports he feels much better now that he has talked to his girlfriend and wants to go over there    Violence Risk Assessment  Assessment of Violence: None noted  Thoughts of Harm to Others: No    Ability to Assess Risk Screen  Risk Screen - Ability to Assess: Able to be screened  Ask Suicide-Screening Questions  1. In the past few weeks, have you wished you were dead?: Yes  2. In the past few weeks, have you felt that you or your family would be better off if you were dead?: No  3. In the past week, have you been having thoughts about killing yourself?: No  4. Have you ever tried to kill yourself?: No  5. Are you having thoughts of killing yourself right now?: No  Calculated Risk Score: Potential  Risk  [place] Suicide Severity Rating Scale (Screener/Recent Self-Report)  1. Wish to be Dead (Past 1 Month): Yes  2. Non-Specific Active Suicidal Thoughts (Past 1 Month): Yes  3. Active Suicidal Ideation with any Methods (Not Plan) Without Intent to Act (Past 1 Month): Yes  4. Active Suicidal Ideation with Some Intent to Act, Without Specific Plan (Past 1 Month): Yes  5. Active Suicidal Ideation with Specific Plan and Intent (Past 1 Month): Yes  6. Suicidal Behavior (Lifetime): No  6. Suicidal Behavior (3 Months): Yes  Calculated C-SSRS Risk Score (Lifetime/Recent): High Risk  Step 1: Risk Factors  Current & Past Psychiatric Dx: Mood disorder, Psychotic disorder, Alcohol/substance abuse disorders  Presenting Symptoms: Impulsivity  Precipitants/Stressors: Substance intoxication or withdrawal  Change in Treatment: Non-compliant or not receiving treatment  Access to Lethal Methods : No  Step 2: Protective Factors   Protective Factors Internal: Identifies reasons for living  Protective Factors External: Responsibility to children, Supportive social network or family or friends  Step 3: Suicidal Ideation Intensity  Most Severe Suicidal Ideation Identified: Patient reported wanting to walk into traffic, however has a hx of SI  How Many Times Have You Had These Thoughts: Less than once a week  When You Have the Thoughts How Long do They Last : Fleeting - few seconds or minutes  Could/Can You Stop Thinking About Killing Yourself or Wanting to Die if You Want to: Can control thoughts with little difficulty  Are There Things - Anyone or Anything - That Stopped You From Wanting to Die or Acting on: Deterrents definitely stopped you from attempting suicide  What Sort of Reasons Did You Have For Thinking About Wanting to Die or Killing Yourself: Completely to get attention, revenge, or a reaction from others  Total Score: 6  Step 5: Documentation  Risk Level: Moderate suicide risk    Psychiatric Impression and Plan of  Care  Assessment and Plan: Patient endorsed SI however after review of chart patient presents with consistent hx of SI at baseline. Patient states he is med compliance, though hx of  presentation shows he may not be. Patint states he was not doing ok earlier but spoke with girlfriend and reports he is going over there. Refusd to drop urine for drug test and presented as suspicious surounding the topic. Denies any drug use. Denies SI and intention of ending life. This writer under the impression patient is unwillingsly to do drug test due to bein afraid of negative results. This writer unde rimpression that psychosis maybe drug induced given history with PCP and presnetation to ED surround use of PCP. Discussed and consulted with ED attending who agreed that patient may be exhbiting substance induced psychosis. ED provider feels patient is able to contract safety. At this time, it is recommended patient is discharged as he cruz snot meet criteria for inpatient.  Specific Resources Provided to Patient: Instructed patient to follw up with outptient providers  CM Notified: n/a  PHP/IOP Recommended: n/a  Specific Information Provided for PHP/IOP: n/a  Plan Comments: Discharge, follow up with outside providers    Outcome/Disposition  Patient's Perception of Outcome Achieved: agreeable to discharge  Assessment, Recommendations and Risk Level Reviewed with: Dr. Damon  Contact Name: none  Contact Number(s): [phone]  Contact Relationship: unknown  EPAT Assessment Completed Date: 01/14/24  EPAT Assessment Completed Time: 1921  Patient Disposition: Home    Social Work Note

## 2024-01-30 ENCOUNTER — CLINICAL SUPPORT (OUTPATIENT)
Dept: EMERGENCY MEDICINE | Facility: HOSPITAL | Age: 44
End: 2024-01-30
Payer: COMMERCIAL

## 2024-01-30 ENCOUNTER — HOSPITAL ENCOUNTER (EMERGENCY)
Facility: HOSPITAL | Age: 44
Discharge: OTHER INSTITUTION | End: 2024-02-01
Attending: EMERGENCY MEDICINE
Payer: COMMERCIAL

## 2024-01-30 DIAGNOSIS — R45.851 SUICIDAL IDEATION: Primary | ICD-10-CM

## 2024-01-30 LAB
ALBUMIN SERPL BCP-MCNC: 4.2 G/DL (ref 3.4–5)
ALP SERPL-CCNC: 71 U/L (ref 33–120)
ALT SERPL W P-5'-P-CCNC: 43 U/L (ref 10–52)
ANION GAP SERPL CALC-SCNC: 14 MMOL/L (ref 10–20)
APAP SERPL-MCNC: <10 UG/ML
AST SERPL W P-5'-P-CCNC: 23 U/L (ref 9–39)
BASOPHILS # BLD AUTO: 0.05 X10*3/UL (ref 0–0.1)
BASOPHILS NFR BLD AUTO: 0.7 %
BILIRUB SERPL-MCNC: 0.3 MG/DL (ref 0–1.2)
BUN SERPL-MCNC: 12 MG/DL (ref 6–23)
CALCIUM SERPL-MCNC: 10 MG/DL (ref 8.6–10.6)
CHLORIDE SERPL-SCNC: 100 MMOL/L (ref 98–107)
CO2 SERPL-SCNC: 25 MMOL/L (ref 21–32)
CREAT SERPL-MCNC: 1.11 MG/DL (ref 0.5–1.3)
EGFRCR SERPLBLD CKD-EPI 2021: 84 ML/MIN/1.73M*2
EOSINOPHIL # BLD AUTO: 0.12 X10*3/UL (ref 0–0.7)
EOSINOPHIL NFR BLD AUTO: 1.6 %
ERYTHROCYTE [DISTWIDTH] IN BLOOD BY AUTOMATED COUNT: 13.2 % (ref 11.5–14.5)
ETHANOL SERPL-MCNC: <10 MG/DL
FLUAV RNA RESP QL NAA+PROBE: NOT DETECTED
FLUBV RNA RESP QL NAA+PROBE: NOT DETECTED
GLUCOSE SERPL-MCNC: 102 MG/DL (ref 74–99)
HCT VFR BLD AUTO: 41.2 % (ref 41–52)
HGB BLD-MCNC: 14.8 G/DL (ref 13.5–17.5)
IMM GRANULOCYTES # BLD AUTO: 0.02 X10*3/UL (ref 0–0.7)
IMM GRANULOCYTES NFR BLD AUTO: 0.3 % (ref 0–0.9)
LYMPHOCYTES # BLD AUTO: 3.39 X10*3/UL (ref 1.2–4.8)
LYMPHOCYTES NFR BLD AUTO: 46.2 %
MCH RBC QN AUTO: 31.8 PG (ref 26–34)
MCHC RBC AUTO-ENTMCNC: 35.9 G/DL (ref 32–36)
MCV RBC AUTO: 89 FL (ref 80–100)
MONOCYTES # BLD AUTO: 0.63 X10*3/UL (ref 0.1–1)
MONOCYTES NFR BLD AUTO: 8.6 %
NEUTROPHILS # BLD AUTO: 3.13 X10*3/UL (ref 1.2–7.7)
NEUTROPHILS NFR BLD AUTO: 42.6 %
NRBC BLD-RTO: 0 /100 WBCS (ref 0–0)
PLATELET # BLD AUTO: 298 X10*3/UL (ref 150–450)
POTASSIUM SERPL-SCNC: 4 MMOL/L (ref 3.5–5.3)
PROT SERPL-MCNC: 7.5 G/DL (ref 6.4–8.2)
RBC # BLD AUTO: 4.65 X10*6/UL (ref 4.5–5.9)
SALICYLATES SERPL-MCNC: <3 MG/DL
SARS-COV-2 RNA RESP QL NAA+PROBE: NOT DETECTED
SODIUM SERPL-SCNC: 135 MMOL/L (ref 136–145)
T4 FREE SERPL-MCNC: 0.99 NG/DL (ref 0.78–1.48)
TSH SERPL-ACNC: 0.33 MIU/L (ref 0.44–3.98)
WBC # BLD AUTO: 7.3 X10*3/UL (ref 4.4–11.3)

## 2024-01-30 PROCEDURE — 80179 DRUG ASSAY SALICYLATE: CPT | Performed by: STUDENT IN AN ORGANIZED HEALTH CARE EDUCATION/TRAINING PROGRAM

## 2024-01-30 PROCEDURE — 99285 EMERGENCY DEPT VISIT HI MDM: CPT | Performed by: EMERGENCY MEDICINE

## 2024-01-30 PROCEDURE — 96372 THER/PROPH/DIAG INJ SC/IM: CPT

## 2024-01-30 PROCEDURE — 84439 ASSAY OF FREE THYROXINE: CPT | Performed by: STUDENT IN AN ORGANIZED HEALTH CARE EDUCATION/TRAINING PROGRAM

## 2024-01-30 PROCEDURE — 85025 COMPLETE CBC W/AUTO DIFF WBC: CPT | Performed by: STUDENT IN AN ORGANIZED HEALTH CARE EDUCATION/TRAINING PROGRAM

## 2024-01-30 PROCEDURE — 2500000004 HC RX 250 GENERAL PHARMACY W/ HCPCS (ALT 636 FOR OP/ED): Mod: SE | Performed by: EMERGENCY MEDICINE

## 2024-01-30 PROCEDURE — 2500000001 HC RX 250 WO HCPCS SELF ADMINISTERED DRUGS (ALT 637 FOR MEDICARE OP): Mod: SE | Performed by: STUDENT IN AN ORGANIZED HEALTH CARE EDUCATION/TRAINING PROGRAM

## 2024-01-30 PROCEDURE — 36415 COLL VENOUS BLD VENIPUNCTURE: CPT | Performed by: STUDENT IN AN ORGANIZED HEALTH CARE EDUCATION/TRAINING PROGRAM

## 2024-01-30 PROCEDURE — 93010 ELECTROCARDIOGRAM REPORT: CPT | Performed by: EMERGENCY MEDICINE

## 2024-01-30 PROCEDURE — 84443 ASSAY THYROID STIM HORMONE: CPT | Performed by: STUDENT IN AN ORGANIZED HEALTH CARE EDUCATION/TRAINING PROGRAM

## 2024-01-30 PROCEDURE — 87636 SARSCOV2 & INF A&B AMP PRB: CPT | Performed by: STUDENT IN AN ORGANIZED HEALTH CARE EDUCATION/TRAINING PROGRAM

## 2024-01-30 PROCEDURE — 80053 COMPREHEN METABOLIC PANEL: CPT | Performed by: STUDENT IN AN ORGANIZED HEALTH CARE EDUCATION/TRAINING PROGRAM

## 2024-01-30 PROCEDURE — 93005 ELECTROCARDIOGRAM TRACING: CPT

## 2024-01-30 RX ORDER — OLANZAPINE 10 MG/2ML
10 INJECTION, POWDER, FOR SOLUTION INTRAMUSCULAR ONCE
Status: COMPLETED | OUTPATIENT
Start: 2024-01-30 | End: 2024-01-31

## 2024-01-30 RX ORDER — TRAZODONE HYDROCHLORIDE 50 MG/1
50 TABLET ORAL ONCE
Status: COMPLETED | OUTPATIENT
Start: 2024-01-30 | End: 2024-01-30

## 2024-01-30 RX ORDER — RISPERIDONE 1 MG/1
2 TABLET ORAL 2 TIMES DAILY
Status: DISCONTINUED | OUTPATIENT
Start: 2024-01-30 | End: 2024-02-01 | Stop reason: HOSPADM

## 2024-01-30 RX ORDER — OLANZAPINE 10 MG/2ML
INJECTION, POWDER, FOR SOLUTION INTRAMUSCULAR
Status: DISPENSED
Start: 2024-01-30 | End: 2024-01-31

## 2024-01-30 RX ADMIN — TRAZODONE HYDROCHLORIDE 50 MG: 50 TABLET ORAL at 22:38

## 2024-01-30 RX ADMIN — RISPERIDONE 2 MG: 1 TABLET ORAL at 20:26

## 2024-01-30 SDOH — SOCIAL STABILITY: SOCIAL NETWORK: EMOTIONAL SUPPORT GIVEN: REASSURE

## 2024-01-30 SDOH — HEALTH STABILITY: MENTAL HEALTH: NEEDS EXPRESSED: EMOTIONAL

## 2024-01-30 SDOH — HEALTH STABILITY: MENTAL HEALTH: BEHAVIORS/MOOD: ANXIOUS;RESTLESS;COOPERATIVE

## 2024-01-30 ASSESSMENT — COLUMBIA-SUICIDE SEVERITY RATING SCALE - C-SSRS
5. HAVE YOU STARTED TO WORK OUT OR WORKED OUT THE DETAILS OF HOW TO KILL YOURSELF? DO YOU INTEND TO CARRY OUT THIS PLAN?: YES
1. IN THE PAST MONTH, HAVE YOU WISHED YOU WERE DEAD OR WISHED YOU COULD GO TO SLEEP AND NOT WAKE UP?: YES
2. HAVE YOU ACTUALLY HAD ANY THOUGHTS OF KILLING YOURSELF?: YES
6. HAVE YOU EVER DONE ANYTHING, STARTED TO DO ANYTHING, OR PREPARED TO DO ANYTHING TO END YOUR LIFE?: YES
6. HAVE YOU EVER DONE ANYTHING, STARTED TO DO ANYTHING, OR PREPARED TO DO ANYTHING TO END YOUR LIFE?: YES
4. HAVE YOU HAD THESE THOUGHTS AND HAD SOME INTENTION OF ACTING ON THEM?: YES

## 2024-01-30 ASSESSMENT — PAIN SCALES - GENERAL: PAINLEVEL_OUTOF10: 0 - NO PAIN

## 2024-01-30 ASSESSMENT — PAIN - FUNCTIONAL ASSESSMENT: PAIN_FUNCTIONAL_ASSESSMENT: 0-10

## 2024-01-30 NOTE — ED TRIAGE NOTES
Pt BIB [facility] after endorsing SI to his . Patient A&Ox3, respirations even and unlabored, patient calm and cooperative, sitter at bedside.

## 2024-01-31 LAB
AMPHETAMINES UR QL SCN: ABNORMAL
APPEARANCE UR: CLEAR
ATRIAL RATE: 69 BPM
BARBITURATES UR QL SCN: ABNORMAL
BENZODIAZ UR QL SCN: ABNORMAL
BILIRUB UR STRIP.AUTO-MCNC: NEGATIVE MG/DL
BZE UR QL SCN: ABNORMAL
CANNABINOIDS UR QL SCN: ABNORMAL
COLOR UR: COLORLESS
FENTANYL+NORFENTANYL UR QL SCN: ABNORMAL
GLUCOSE UR STRIP.AUTO-MCNC: NEGATIVE MG/DL
KETONES UR STRIP.AUTO-MCNC: NEGATIVE MG/DL
LEUKOCYTE ESTERASE UR QL STRIP.AUTO: NEGATIVE
NITRITE UR QL STRIP.AUTO: NEGATIVE
OPIATES UR QL SCN: ABNORMAL
OXYCODONE+OXYMORPHONE UR QL SCN: ABNORMAL
P AXIS: 66 DEGREES
P OFFSET: 204 MS
P ONSET: 157 MS
PCP UR QL SCN: ABNORMAL
PH UR STRIP.AUTO: 7 [PH]
PR INTERVAL: 122 MS
PROT UR STRIP.AUTO-MCNC: NEGATIVE MG/DL
Q ONSET: 218 MS
QRS COUNT: 11 BEATS
QRS DURATION: 88 MS
QT INTERVAL: 356 MS
QTC CALCULATION(BAZETT): 381 MS
QTC FREDERICIA: 373 MS
R AXIS: 60 DEGREES
RBC # UR STRIP.AUTO: NEGATIVE /UL
SP GR UR STRIP.AUTO: 1
T AXIS: 65 DEGREES
T OFFSET: 396 MS
UROBILINOGEN UR STRIP.AUTO-MCNC: <2 MG/DL
VENTRICULAR RATE: 69 BPM

## 2024-01-31 PROCEDURE — 81003 URINALYSIS AUTO W/O SCOPE: CPT | Mod: 59 | Performed by: EMERGENCY MEDICINE

## 2024-01-31 PROCEDURE — 2500000004 HC RX 250 GENERAL PHARMACY W/ HCPCS (ALT 636 FOR OP/ED): Mod: SE | Performed by: EMERGENCY MEDICINE

## 2024-01-31 PROCEDURE — 80307 DRUG TEST PRSMV CHEM ANLYZR: CPT | Performed by: STUDENT IN AN ORGANIZED HEALTH CARE EDUCATION/TRAINING PROGRAM

## 2024-01-31 PROCEDURE — 2500000004 HC RX 250 GENERAL PHARMACY W/ HCPCS (ALT 636 FOR OP/ED): Mod: SE | Performed by: STUDENT IN AN ORGANIZED HEALTH CARE EDUCATION/TRAINING PROGRAM

## 2024-01-31 RX ADMIN — RISPERIDONE 2 MG: 1 TABLET ORAL at 10:34

## 2024-01-31 RX ADMIN — OLANZAPINE 10 MG: 10 INJECTION, POWDER, FOR SOLUTION INTRAMUSCULAR at 06:30

## 2024-01-31 RX ADMIN — RISPERIDONE 2 MG: 1 TABLET ORAL at 20:51

## 2024-01-31 ASSESSMENT — PAIN SCALES - GENERAL: PAINLEVEL_OUTOF10: 0 - NO PAIN
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
## 2024-01-31 NOTE — ED PROVIDER NOTES
HPI:  Patient is a 43-year-old male with history of schizoaffective disorder who presents with suicidal ideation.  Patient states he lives in a group home and visited his  today and expressed suicidal ideation.  His  subsequently called EMS and directed him to the ED for further evaluation.  He reports a plan to walk in front of traffic but denies homicidal ideation.  No tactile, visual auditory hallucinations.  No chest pain, shortness of breath, nausea, vomiting, diarrhea or abdominal pain.    ROS: A 10-system ROS was performed and was negative except as documented in the HPI.    PMH/PSH: Reviewed in EMR. As above in HPI.  SH: Denies EtOH, tobacco or illicit drug use.  Allergies:   Allergies   Allergen Reactions    Ibuprofen Nausea Only and Nausea/vomiting    Gluten Protein Diarrhea      Medications: See prescription writer for full medication list.     General: no acute distress, appropriate conversation but intermittently tearful  HEENT:  No rhinorrhea. MMM.  Cardiac: regular rate rhythm, no murmurs  Pulm:  normal respiratory effort on room air, equal chest expansion, clear bilaterally, no wheeze or crackles  GI: soft, nontender, nondistended, +BS  Extremities:  moves all extremities freely, no edema noted  Skin: warm, well-perfused, no lesions noted on exposed skin.  Neuro:  AOx3, moves all 4 extremities freely and independently   Psych: normal affect for situation, appropriate eye contact, does not appear to be responding to internal stimuli    Assessment/Plan/MDM  Patient is a 43-year-old male with history of schizoaffective disorder who presents with suicidal ideation.  Patient evaluated by EPAT and they are recommending inpatient psychiatric admission.  Acute toxicology panel negative.  Free T4 within normal limits.  COVID/flu swabs within normal limits.  There are no gross electrolyte derangements, leukocytosis or anemia.  Patient advised of admission and is in agreement.  He is  calm and cooperative.    EKG shows normal sinus rhythm, rate 69, normal axis, normal parables, normal QTc, no ST elevation, unchanged from January 14, 2024.      ED Course/Progress:    Diagnoses as of 01/30/24 2128   Suicidal ideation        Clinical Impression: as above  Dispo: EPAT to place    Pt seen and discussed with attending physician, Dr. Jenny De León MD  PGY3, Emergency Medicine    Disclaimer: This note was dictated by speech recognition. An attempt at proof reading was made to minimize errors. Errors in transcription may be present.  Please call if questions.      Zenobia De León MD  Resident  01/30/24 2128      -------------------------------------------  This patient was seen by the resident physician.  I have seen and examined the patient, agree with the workup, evaluation, management and diagnosis. I reviewed and edited the above documentation where necessary.     I have looked at the EKG and agree with the interpretation.    Arnoldo Alvarado MD   Attending Physician      Arnoldo Alvarado MD MPH  01/31/24 0467

## 2024-01-31 NOTE — SIGNIFICANT EVENT
Application for Emergency Admission      Ready for Transfer?  Is the patient medically cleared for transfer to inpatient psychiatry: Yes  Has the patient been accepted to an inpatient psychiatric hospital: Yes    Application for Emergency Admission  IN ACCORDANCE WITH [place]  The Chief Clinical Officer of: Denise 1/31/2024 .10:22 AM    Reason for Hospitalization  The undersigned has reason to believe that: Shamar Cunningham Is a mentally ill person subject to hospitalization by court order under division B Section 5122.01 of the Revised Code, i.e., this person:    1.Yes  Represents a substantial risk of physical harm to self as manifested by evidence of threats of, or attempts at, suicide or serious self-inflicted bodily harm    2.No Represents a substantial risk of physical harm to others as manifested by evidence of recent homicidal or other violent behavior, evidence of recent threats that place another in reasonable fear of violent behavior and serious physical harm, or other evidence of present dangerousness    3.No Represents a substantial and immediate risk of serious physical impairment or injury to self as manifested by  evidence that the person is unable to provide for and is not providing for the person's basic physical needs because of the person's mental illness and that appropriate provision for those needs cannot be made  immediately available in the community    4.Yes Would benefit from treatment in a hospital for his mental illness and is in need of such treatment as manifested by evidence of behavior that creates a grave and imminent risk to substantial rights of others or  himself.    5.Yes Would benefit from treatment as manifested by evidence of behavior that indicates all of the following:       (a) The person is unlikely to survive safely in the community without supervision, based on a clinical determination.       (b) The person has a history of lack of compliance with  treatment for mental illness and one of the following applies:      (i) At least twice within the thirty-six months prior to the filing of an affidavit seeking court-ordered treatment of the person under section 5122.111 of the Revised Code, the lack of compliance has been a significant factor in necessitating hospitalization in a hospital or receipt of services in a forensic or other mental health unit of a correctional facility, provided that the thirty-six-month period shall be extended by the length of any hospitalization or incarceration of the person that occurred within the thirty-six-month period.      (ii) Within the forty-eight months prior to the filing of an affidavit seeking court-ordered treatment of the person under section 5122.111 of the Revised Code, the lack of compliance resulted in one or more acts of serious violent behavior toward self or others or threats of, or attempts at, serious physical harm to self or others, provided that the forty-eight-month period shall be extended by the length of any hospitalization or incarceration of the person that occurred within the forty-eight-month period.      (c) The person, as a result of mental illness, is unlikely to voluntarily participate in necessary treatment.       (d) In view of the person's treatment history and current behavior, the person is in need of treatment in order to prevent a relapse or deterioration that would be likely to result in substantial risk of serious harm to the person or others.    (e) Represents a substantial risk of physical harm to self or others if allowed to remain at liberty pending examination.    Therefore, it is requested that said person be admitted to the above named facility.    STATEMENT OF BELIEF    Must be filled out by one of the following: a psychiatrist, licensed physician, licensed clinical psychologist, health or ,  or .  (Statement shall include the circumstances under  which the individual was taken into custody and the reason for the person's belief that hospitalization is necessary. The statement shall also include a reference to efforts made to secure the individual's property at his residence if he was taken into custody there. Every reasonable and appropriate effort should be made to take this person into custody in the least conspicuous manner possible.)    ANA Gerardo MD 1/31/2024     _____________________________________________________________   Place of Employment: [facility]    STATEMENT OF OBSERVATION BY PSYCHIATRIST, LICENSED PHYSICIAN, OR LICENSED CLINICAL PSYCHOLOGIST, IF APPLICABLE    Place of Observation (e.g., [facility], general hospital, office, emergency facility)  (If applicable, please complete)    Pieter Gerardo MD 1/31/2024    _____________________________________________________________

## 2024-01-31 NOTE — PROGRESS NOTES
"Care of patient was taken over at 2300. See previous provider note for details of history and presentation, initial workup and management.     Briefly, this is a 43-year-old male with a past med history of schizoaffective disorder presenting to the emergency department with suicidal ideation, endorsing a plan to walk into traffic.  Patient was signed out as medically cleared pending EPAT placement.    During my care of the patient, was informed patient has not yet given a urine sample.  He has been here for 15 hours, has used the restroom multiple times however refused to give a urine sample, stating \"my 72 hours are almost up.\"  In order to proceed with placement, do need UDS.  Discussed this with patient to his not willing to provide sample at this time and he became agitated, requiring police to be called.  In order to facilitate care and psychiatric placement, he was given 10 mg of Zyprexa with plan for possible straight cath if he continues to refuse to give a urinary sample.  He will be signed out to the incoming team pending UA, EPAT to place.    Diagnoses as of 01/31/24 0624   Suicidal ideation        Pt was discussed with ED Attending, Dr. Patel.     Emma Frey,    EM PGY3       ATTESTATION  Patient received in handoff.  Please review initial/primary ED provider note for full MDM, laboratory results, imaging and full history.  In brief, per signout discussion, this is a 43 year old male with history of schizoaffective disorder, substance use disorder (PCP, cannabis, cocaine) who was brought to the [facility] ED after reportedly endorsing suicidal ideation to his .  He was evaluated by psychiatry and per their evaluation, recommended inpatient psychiatric admission and did not have capacity to leave AMA.  Per notes:    \"Patient does meet criteria for inpatient psychiatric admission as he currently presents a high risk of harm to self.      IMPRESSION  -Schizoaffective disorder, depressed type, acute " "exacerbation 2/2 medication non-adherence   -Cannabis use disorder by history  -Stimulant use disorder (cocaine) by history  -PCP use disorder by history     RECOMMENDATIONS  - Patient DOES currently meet criteria for inpatient psychiatric admission. Once patient is deemed medically cleared, please document clearly in note that patient is MEDICALLY CLEARED. Please do not issue Application for Emergency Admission (pink slip) until notified by EPAT that patient is accepted to an inpatient psychiatric unit and is ready to be discharged.  - Patient lacks the capacity to leave AMA at this time and thus cannot leave AMA. Call [facility] if patient attempts to elope.  - Call [employer] [facility] as needed for agitated, threatening, and non-redirecteable behaviors.  - To evaluate decision-making capacity, recommend use of the Capacity Evaluation Tool under \"Documents\"   - Patient DOES require a 1:1 sitter with suicide precautions from a psychiatric perspective at this time.  - Patient should be in hospital attire. Please remove/secure personal belongings from the room.  - UDS/UA and EKG pending for medical clearance.      Medications:  - START risperidone 2mg by mouth twice daily for psychosis.\"    The patient had been ordered Zyprexa by prior ED team due to agitation and intermittently poor ability to be redirected.  He was initially verbally redirectable, but subsequently became intermittently agitated again, leaving room multiple times, and became aggressive with staff, requiring police to be called.  As noted above, psychiatry required UDS/UA for medical clearance and inpatient admission (see note above). He had otherwise been medically cleared by prior ED provider team. He was able to use the restroom multiple times and had no signs of urinary retention or incontinence. He was advised of request for urine sample multiple times, but again, became agitated and poorly redirectable, and hence was administered the previously " ordered Zyprexa. He tolerated this without any apparent difficulty and was closely monitored and did not exhibit and signs of respiratory, hemodynamic, or acute neurological decompensation. If still unwilling to provide urine sample per psychiatry, he may require straight cath, and he was advised of this.  Patient was signed out to oncoming ED provider team, pending placement by psychiatry, remaining labs, and re-evaluation.

## 2024-01-31 NOTE — PROGRESS NOTES
This patient was signed out to me from previous provider, see provider for HPI and hospital course.  In brief this is a 43-year-old male with history of schizoaffective disorder presented for suicidal ideations with plan to walk into traffic.  Prior to my taking over care laboratory work is unremarkable.  Urinalysis pending.  Patient to be placed by EPAT pending urinalysis.  Urinalysis unremarkable, drug screen positive for PCP and cannabinoids.    Patient will be placed at [facility].    Patient is medically clear for inpatient psychiatric hospitalization and transfer as needed.      Imaging and laboratory work as seen below has been reviewed.    Labs Reviewed   COMPREHENSIVE METABOLIC PANEL - Abnormal       Result Value    Glucose 102 (*)     Sodium 135 (*)     Potassium 4.0      Chloride 100      Bicarbonate 25      Anion Gap 14      Urea Nitrogen 12      Creatinine 1.11      eGFR 84      Calcium 10.0      Albumin 4.2      Alkaline Phosphatase 71      Total Protein 7.5      AST 23      Bilirubin, Total 0.3      ALT 43     TSH WITH REFLEX TO FREE T4 IF ABNORMAL - Abnormal    Thyroid Stimulating Hormone 0.33 (*)     Narrative:     TSH testing is performed using different testing methodology at [facility] than at other [facility] hospitals. Direct result comparisons should only be made within the same method.     SARS-COV-2 AND INFLUENZA A/B PCR - Normal    Flu A Result Not Detected      Flu B Result Not Detected      Coronavirus 2019, PCR Not Detected      Narrative:     This assay has received FDA Emergency Use Authorization (EUA) and  is only authorized for the duration of time that circumstances exist to justify the authorization of the emergency use of in vitro diagnostic tests for the detection of SARS-CoV-2 virus and/or diagnosis of COVID-19 infection under section 564(b)(1) of the Act, 21 U.S.C. 360bbb-3(b)(1). Testing for SARS-CoV-2 is only recommended for patients who meet current clinical  and/or epidemiological criteria as defined by federal, state, or local public health directives. This assay is an in vitro diagnostic nucleic acid amplification test for the qualitative detection of SARS-CoV-2, Influenza A, and Influenza B from nasopharyngeal specimens and has been validated for use at [facility]. Negative results do not preclude COVID-19 infections or Influenza A/B infections, and should not be used as the sole basis for diagnosis, treatment, or other management decisions. If Influenza A/B and RSV PCR results are negative, testing for Parainfluenza virus, Adenovirus and Metapneumovirus is routinely performed for [facility] pediatric oncology and intensive care inpatients, and is available on other patients by placing an add-on request.    ACUTE TOXICOLOGY PANEL, BLOOD - Normal    Acetaminophen <10.0      Salicylate  <3      Alcohol <10     THYROXINE, FREE - Normal    Thyroxine, Free 0.99      Narrative:     Thyroxine Free testing is performed using different testing methodology at [facility] than at [facility]. Direct result comparisons should only be made within the same method.   CBC WITH AUTO DIFFERENTIAL    WBC 7.3      nRBC 0.0      RBC 4.65      Hemoglobin 14.8      Hematocrit 41.2      MCV 89      MCH 31.8      MCHC 35.9      RDW 13.2      Platelets 298      Neutrophils % 42.6      Immature Granulocytes %, Automated 0.3      Lymphocytes % 46.2      Monocytes % 8.6      Eosinophils % 1.6      Basophils % 0.7      Neutrophils Absolute 3.13      Immature Granulocytes Absolute, Automated 0.02      Lymphocytes Absolute 3.39      Monocytes Absolute 0.63      Eosinophils Absolute 0.12      Basophils Absolute 0.05     DRUG SCREEN,URINE   URINALYSIS WITH REFLEX MICROSCOPIC     No orders to display       Diagnoses as of 01/31/24 0734   Suicidal ideation

## 2024-02-01 VITALS
HEIGHT: 71 IN | WEIGHT: 180 LBS | DIASTOLIC BLOOD PRESSURE: 82 MMHG | SYSTOLIC BLOOD PRESSURE: 130 MMHG | BODY MASS INDEX: 25.2 KG/M2 | OXYGEN SATURATION: 99 % | HEART RATE: 93 BPM | RESPIRATION RATE: 18 BRPM | TEMPERATURE: 97 F

## 2024-02-01 PROCEDURE — 2500000004 HC RX 250 GENERAL PHARMACY W/ HCPCS (ALT 636 FOR OP/ED): Mod: SE | Performed by: EMERGENCY MEDICINE

## 2024-02-01 RX ADMIN — RISPERIDONE 2 MG: 1 TABLET ORAL at 08:04

## 2024-02-01 NOTE — PROGRESS NOTES
Patient was signed out to me by Adam Robledo at approximately 1500 on 01/31/24 . For full history, physical, and prior ED course, please see previous provider note prior to patient handoff. This is an addendum to the record.     MDM:  Shamar Cunningham is a 43 y.o. male presenting to the ED due to Suicidal ideations from decompensated schizoaffective disorder. Patient was seen and evaluated by the previous team. At time of sign out was pending transfer to [facility] . The patient did not require medication for anxiolysis during the previous shift.. During my shift, he remained calm and cooperative.. Ultimately, the patient was  pending transportation to [facility] at approximately midnight.  Was signed out to the oncoming team at approximately 2300.      Final diagnoses:   [R45.851] Suicidal ideation       Disposition:  Transfer    Jessica Chapman MD   Emergency Medicine Resident, PGY3  [facility]    ED Course:  Diagnoses as of 01/31/24 2125   Suicidal ideation       Procedure  Procedures           Have Your Skin Lesions Been Treated?: not been treated Is This A New Presentation, Or A Follow-Up?: Skin Lesions How Severe Is Your Skin Lesion?: moderate

## 2024-03-15 ENCOUNTER — CLINICAL SUPPORT (OUTPATIENT)
Dept: EMERGENCY MEDICINE | Facility: HOSPITAL | Age: 44
End: 2024-03-15
Payer: COMMERCIAL

## 2024-03-15 ENCOUNTER — HOSPITAL ENCOUNTER (EMERGENCY)
Facility: HOSPITAL | Age: 44
Discharge: OTHER INSTITUTION | End: 2024-03-17
Attending: EMERGENCY MEDICINE
Payer: COMMERCIAL

## 2024-03-15 DIAGNOSIS — R45.851 SUICIDAL IDEATION: Primary | ICD-10-CM

## 2024-03-15 LAB
ALBUMIN SERPL BCP-MCNC: 4.6 G/DL (ref 3.4–5)
ALP SERPL-CCNC: 70 U/L (ref 33–120)
ALT SERPL W P-5'-P-CCNC: 13 U/L (ref 10–52)
AMPHETAMINES UR QL SCN: ABNORMAL
ANION GAP SERPL CALC-SCNC: 12 MMOL/L (ref 10–20)
APAP SERPL-MCNC: <10 UG/ML
AST SERPL W P-5'-P-CCNC: 21 U/L (ref 9–39)
ATRIAL RATE: 52 BPM
BARBITURATES UR QL SCN: ABNORMAL
BASOPHILS # BLD AUTO: 0.04 X10*3/UL (ref 0–0.1)
BASOPHILS NFR BLD AUTO: 0.7 %
BENZODIAZ UR QL SCN: ABNORMAL
BILIRUB SERPL-MCNC: 0.4 MG/DL (ref 0–1.2)
BUN SERPL-MCNC: 13 MG/DL (ref 6–23)
BZE UR QL SCN: ABNORMAL
CALCIUM SERPL-MCNC: 10 MG/DL (ref 8.6–10.6)
CANNABINOIDS UR QL SCN: ABNORMAL
CHLORIDE SERPL-SCNC: 103 MMOL/L (ref 98–107)
CO2 SERPL-SCNC: 26 MMOL/L (ref 21–32)
CREAT SERPL-MCNC: 1.2 MG/DL (ref 0.5–1.3)
EGFRCR SERPLBLD CKD-EPI 2021: 77 ML/MIN/1.73M*2
EOSINOPHIL # BLD AUTO: 0.07 X10*3/UL (ref 0–0.7)
EOSINOPHIL NFR BLD AUTO: 1.3 %
ERYTHROCYTE [DISTWIDTH] IN BLOOD BY AUTOMATED COUNT: 12.3 % (ref 11.5–14.5)
ETHANOL SERPL-MCNC: <10 MG/DL
FENTANYL+NORFENTANYL UR QL SCN: ABNORMAL
GLUCOSE SERPL-MCNC: 88 MG/DL (ref 74–99)
HCT VFR BLD AUTO: 44.6 % (ref 41–52)
HGB BLD-MCNC: 16.2 G/DL (ref 13.5–17.5)
IMM GRANULOCYTES # BLD AUTO: 0 X10*3/UL (ref 0–0.7)
IMM GRANULOCYTES NFR BLD AUTO: 0 % (ref 0–0.9)
LYMPHOCYTES # BLD AUTO: 2.89 X10*3/UL (ref 1.2–4.8)
LYMPHOCYTES NFR BLD AUTO: 51.9 %
MCH RBC QN AUTO: 32.1 PG (ref 26–34)
MCHC RBC AUTO-ENTMCNC: 36.3 G/DL (ref 32–36)
MCV RBC AUTO: 89 FL (ref 80–100)
METHADONE UR QL SCN: ABNORMAL
MONOCYTES # BLD AUTO: 0.42 X10*3/UL (ref 0.1–1)
MONOCYTES NFR BLD AUTO: 7.5 %
NEUTROPHILS # BLD AUTO: 2.15 X10*3/UL (ref 1.2–7.7)
NEUTROPHILS NFR BLD AUTO: 38.6 %
NRBC BLD-RTO: 0 /100 WBCS (ref 0–0)
OPIATES UR QL SCN: ABNORMAL
OXYCODONE+OXYMORPHONE UR QL SCN: ABNORMAL
P AXIS: 66 DEGREES
P OFFSET: 192 MS
P ONSET: 138 MS
PCP UR QL SCN: ABNORMAL
PLATELET # BLD AUTO: 300 X10*3/UL (ref 150–450)
POTASSIUM SERPL-SCNC: 4.5 MMOL/L (ref 3.5–5.3)
PR INTERVAL: 158 MS
PROT SERPL-MCNC: 7.3 G/DL (ref 6.4–8.2)
Q ONSET: 217 MS
QRS COUNT: 8 BEATS
QRS DURATION: 88 MS
QT INTERVAL: 396 MS
QTC CALCULATION(BAZETT): 368 MS
QTC FREDERICIA: 377 MS
R AXIS: 52 DEGREES
RBC # BLD AUTO: 5.04 X10*6/UL (ref 4.5–5.9)
SALICYLATES SERPL-MCNC: <3 MG/DL
SARS-COV-2 RNA RESP QL NAA+PROBE: NOT DETECTED
SODIUM SERPL-SCNC: 136 MMOL/L (ref 136–145)
T AXIS: 49 DEGREES
T OFFSET: 415 MS
VENTRICULAR RATE: 52 BPM
WBC # BLD AUTO: 5.6 X10*3/UL (ref 4.4–11.3)

## 2024-03-15 PROCEDURE — 87635 SARS-COV-2 COVID-19 AMP PRB: CPT | Performed by: STUDENT IN AN ORGANIZED HEALTH CARE EDUCATION/TRAINING PROGRAM

## 2024-03-15 PROCEDURE — 99285 EMERGENCY DEPT VISIT HI MDM: CPT

## 2024-03-15 PROCEDURE — 80307 DRUG TEST PRSMV CHEM ANLYZR: CPT | Performed by: STUDENT IN AN ORGANIZED HEALTH CARE EDUCATION/TRAINING PROGRAM

## 2024-03-15 PROCEDURE — 99285 EMERGENCY DEPT VISIT HI MDM: CPT | Performed by: EMERGENCY MEDICINE

## 2024-03-15 PROCEDURE — 80053 COMPREHEN METABOLIC PANEL: CPT | Performed by: STUDENT IN AN ORGANIZED HEALTH CARE EDUCATION/TRAINING PROGRAM

## 2024-03-15 PROCEDURE — 93010 ELECTROCARDIOGRAM REPORT: CPT | Performed by: EMERGENCY MEDICINE

## 2024-03-15 PROCEDURE — 93005 ELECTROCARDIOGRAM TRACING: CPT

## 2024-03-15 PROCEDURE — 36415 COLL VENOUS BLD VENIPUNCTURE: CPT | Performed by: STUDENT IN AN ORGANIZED HEALTH CARE EDUCATION/TRAINING PROGRAM

## 2024-03-15 PROCEDURE — 80143 DRUG ASSAY ACETAMINOPHEN: CPT | Performed by: STUDENT IN AN ORGANIZED HEALTH CARE EDUCATION/TRAINING PROGRAM

## 2024-03-15 PROCEDURE — 85025 COMPLETE CBC W/AUTO DIFF WBC: CPT | Performed by: STUDENT IN AN ORGANIZED HEALTH CARE EDUCATION/TRAINING PROGRAM

## 2024-03-15 PROCEDURE — 2500000002 HC RX 250 W HCPCS SELF ADMINISTERED DRUGS (ALT 637 FOR MEDICARE OP, ALT 636 FOR OP/ED): Mod: SE | Performed by: EMERGENCY MEDICINE

## 2024-03-15 PROCEDURE — 2500000001 HC RX 250 WO HCPCS SELF ADMINISTERED DRUGS (ALT 637 FOR MEDICARE OP): Mod: SE | Performed by: STUDENT IN AN ORGANIZED HEALTH CARE EDUCATION/TRAINING PROGRAM

## 2024-03-15 RX ORDER — TRAZODONE HYDROCHLORIDE 50 MG/1
100 TABLET ORAL NIGHTLY
Status: DISCONTINUED | OUTPATIENT
Start: 2024-03-15 | End: 2024-03-17 | Stop reason: HOSPADM

## 2024-03-15 RX ORDER — OLANZAPINE 5 MG/1
20 TABLET ORAL NIGHTLY
Status: DISCONTINUED | OUTPATIENT
Start: 2024-03-15 | End: 2024-03-17 | Stop reason: HOSPADM

## 2024-03-15 RX ORDER — BENZTROPINE MESYLATE 1 MG/1
2 TABLET ORAL 2 TIMES DAILY
Status: DISCONTINUED | OUTPATIENT
Start: 2024-03-15 | End: 2024-03-17 | Stop reason: HOSPADM

## 2024-03-15 RX ORDER — OLANZAPINE 10 MG/1
20 TABLET, ORALLY DISINTEGRATING ORAL ONCE
Status: COMPLETED | OUTPATIENT
Start: 2024-03-15 | End: 2024-03-15

## 2024-03-15 RX ADMIN — TRAZODONE HYDROCHLORIDE 100 MG: 50 TABLET ORAL at 23:49

## 2024-03-15 RX ADMIN — BUSPIRONE HYDROCHLORIDE 15 MG: 5 TABLET ORAL at 23:46

## 2024-03-15 RX ADMIN — BENZTROPINE MESYLATE 2 MG: 1 TABLET ORAL at 23:49

## 2024-03-15 RX ADMIN — OLANZAPINE 20 MG: 10 TABLET, ORALLY DISINTEGRATING ORAL at 15:13

## 2024-03-15 SDOH — HEALTH STABILITY: MENTAL HEALTH: CONTENT: UNREMARKABLE

## 2024-03-15 SDOH — HEALTH STABILITY: MENTAL HEALTH

## 2024-03-15 SDOH — HEALTH STABILITY: MENTAL HEALTH: DELUSIONS: CONTROLLED

## 2024-03-15 SDOH — HEALTH STABILITY: MENTAL HEALTH: HALLUCINATION: AUDITORY

## 2024-03-15 SDOH — HEALTH STABILITY: MENTAL HEALTH: SLEEP PATTERN: UNABLE TO ASSESS

## 2024-03-15 SDOH — HEALTH STABILITY: MENTAL HEALTH: SUICIDE ASSESSMENT: ADULT (C-SSRS)

## 2024-03-15 SDOH — HEALTH STABILITY: MENTAL HEALTH: HALLUCINATION: UNABLE TO ASSESS

## 2024-03-15 SDOH — HEALTH STABILITY: MENTAL HEALTH: BEHAVIORS/MOOD: SLEEPING

## 2024-03-15 SDOH — SOCIAL STABILITY: SOCIAL NETWORK: VISITOR BEHAVIORS: UNABLE TO ASSESS

## 2024-03-15 SDOH — HEALTH STABILITY: MENTAL HEALTH: NEEDS EXPRESSED: PHYSICAL

## 2024-03-15 SDOH — SOCIAL STABILITY: SOCIAL NETWORK

## 2024-03-15 SDOH — SOCIAL STABILITY: SOCIAL INSECURITY: FAMILY BEHAVIORS: APPROPRIATE FOR SITUATION;CALM;COOPERATIVE

## 2024-03-15 SDOH — SOCIAL STABILITY: SOCIAL NETWORK: VISITOR BEHAVIORS: APPROPRIATE FOR SITUATION;CALM;COOPERATIVE

## 2024-03-15 SDOH — SOCIAL STABILITY: SOCIAL INSECURITY: FAMILY BEHAVIORS: UNABLE TO ASSESS

## 2024-03-15 ASSESSMENT — LIFESTYLE VARIABLES
EVER FELT BAD OR GUILTY ABOUT YOUR DRINKING: NO
HAVE YOU EVER FELT YOU SHOULD CUT DOWN ON YOUR DRINKING: NO
EVER HAD A DRINK FIRST THING IN THE MORNING TO STEADY YOUR NERVES TO GET RID OF A HANGOVER: NO
HAVE PEOPLE ANNOYED YOU BY CRITICIZING YOUR DRINKING: NO

## 2024-03-15 ASSESSMENT — COLUMBIA-SUICIDE SEVERITY RATING SCALE - C-SSRS
5. HAVE YOU STARTED TO WORK OUT OR WORKED OUT THE DETAILS OF HOW TO KILL YOURSELF? DO YOU INTEND TO CARRY OUT THIS PLAN?: YES
6. HAVE YOU EVER DONE ANYTHING, STARTED TO DO ANYTHING, OR PREPARED TO DO ANYTHING TO END YOUR LIFE?: YES
6. HAVE YOU EVER DONE ANYTHING, STARTED TO DO ANYTHING, OR PREPARED TO DO ANYTHING TO END YOUR LIFE?: YES
1. IN THE PAST MONTH, HAVE YOU WISHED YOU WERE DEAD OR WISHED YOU COULD GO TO SLEEP AND NOT WAKE UP?: YES
4. HAVE YOU HAD THESE THOUGHTS AND HAD SOME INTENTION OF ACTING ON THEM?: YES
2. HAVE YOU ACTUALLY HAD ANY THOUGHTS OF KILLING YOURSELF?: YES

## 2024-03-15 ASSESSMENT — PAIN - FUNCTIONAL ASSESSMENT: PAIN_FUNCTIONAL_ASSESSMENT: 0-10

## 2024-03-15 ASSESSMENT — PAIN SCALES - GENERAL: PAINLEVEL_OUTOF10: 0 - NO PAIN

## 2024-03-15 NOTE — ED TRIAGE NOTES
"Pt states off his meds for \"a while\" and hearing voices telling him to walk into traffic. States unable to get to grocery store so has not eaten. Currently calm and cooperative.   "
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
## 2024-03-15 NOTE — ED PROVIDER NOTES
"HPI   Chief Complaint   Patient presents with   • Suicidal       HPI     Patient is a 43-year-old male with a past medical history of schizoaffective disorder presenting to the emergency department with suicidal ideations and command hallucinations.  Patient states that he is worried that \"the bad people outside his apartment\" are going to hurt him.  States that he thinks that they killed his friend recently.  Additionally states that he hears voices that are encouraging to hurt himself.  Says that he has continued thoughts of suicide.  States that the voices tell him to go in front of traffic and get hit by a car.  He is thinking about this often.  Denies any homicidal ideation, visual hallucinations.  States that he is supposed be on medication for schizoaffective disorder and anxiety, but is intermittently compliant.  States that he does use marijuana but denies any other illicit drug use or alcohol use or tobacco use.               Jac Coma Scale Score: 15                     Patient History   Past Medical History:   Diagnosis Date   • Other conditions influencing health status 07/20/2013    Closed Fracture Of Scaphoid Bone   • Other conditions influencing health status     Involutional Melancholia (MDD) - Severe, With Psychotic Behavior   • Pain in unspecified ankle and joints of unspecified foot     Toe joint pain   • Personal history of other diseases of the nervous system and sense organs 07/10/2015    History of carpal tunnel syndrome   • Personal history of other specified conditions     History of insomnia     Past Surgical History:   Procedure Laterality Date   • OTHER SURGICAL HISTORY  07/10/2015    Wrist Surgery   • OTHER SURGICAL HISTORY  07/10/2015    Brain Surgery     No family history on file.  Social History     Tobacco Use   • Smoking status: Unknown   • Smokeless tobacco: Not on file   Substance Use Topics   • Alcohol use: Not on file   • Drug use: Not on file       Physical Exam   ED Triage " Vitals [03/15/24 1421]   Temperature Heart Rate Respirations BP   36.5 °C (97.7 °F) 60 16 123/80      Pulse Ox Temp Source Heart Rate Source Patient Position   98 % Oral -- Sitting      BP Location FiO2 (%)     Left arm --       Physical Exam  Constitutional:       Appearance: He is not toxic-appearing or diaphoretic.   HENT:      Head: Normocephalic and atraumatic.      Nose: Nose normal.   Eyes:      General: No scleral icterus.        Right eye: No discharge.         Left eye: No discharge.      Conjunctiva/sclera: Conjunctivae normal.   Cardiovascular:      Rate and Rhythm: Normal rate.      Heart sounds: No murmur heard.     No friction rub. No gallop.   Pulmonary:      Effort: No respiratory distress.      Breath sounds: Normal breath sounds.   Abdominal:      General: There is no distension.      Palpations: Abdomen is soft.   Musculoskeletal:         General: No deformity or signs of injury.      Cervical back: Neck supple. No rigidity.   Skin:     General: Skin is warm and dry.   Neurological:      General: No focal deficit present.      Mental Status: He is alert and oriented to person, place, and time.   Psychiatric:         Mood and Affect: Mood normal.         Behavior: Behavior normal.         ED Course & MDM   ED Course as of 03/15/24 1543   Fri Mar 15, 2024   1436 EKG taken at 1434 showing normal sinus bradycardia, normal axis, normal intervals, no signs of acute ST elevation or depression.  Incidental note is made of poor and artifactual baseline in several leads [DS]      ED Course User Index  [DS] Casimiro Abraham MD       Medical Decision Making  Patient is a 43-year-old male presenting to the emergency department with suicidal ideation and command hallucinations.  Patient is requesting food and other material assistance as well.  Labs were drawn in anticipation of need for EPAT assessment.  Patient was alert, oriented, redirectable, polite at bedside.  Did have a slightly dysphoric mood and was  depressed and crying at one point.  Patient denying any acute medical complaints of chest pain, abdominal pain, nausea, vomiting, fever, chills.  Was hemodynamically stable and ambulated without deficit.  Did not appear clinically intoxicated.  Physical exam reassuring.  Patient will be handed off pending results of his laboratory evaluation and plan for EPAT assessment.    Procedure  Procedures     Casimiro Abraham MD  Resident  03/15/24 [id number]       Casimiro Abraham MD  Resident  03/15/24 [id number]

## 2024-03-16 PROCEDURE — 2500000002 HC RX 250 W HCPCS SELF ADMINISTERED DRUGS (ALT 637 FOR MEDICARE OP, ALT 636 FOR OP/ED): Mod: SE | Performed by: STUDENT IN AN ORGANIZED HEALTH CARE EDUCATION/TRAINING PROGRAM

## 2024-03-16 PROCEDURE — 2500000001 HC RX 250 WO HCPCS SELF ADMINISTERED DRUGS (ALT 637 FOR MEDICARE OP): Mod: SE | Performed by: STUDENT IN AN ORGANIZED HEALTH CARE EDUCATION/TRAINING PROGRAM

## 2024-03-16 RX ORDER — METHOCARBAMOL 500 MG/1
1000 TABLET, FILM COATED ORAL ONCE
Status: COMPLETED | OUTPATIENT
Start: 2024-03-16 | End: 2024-03-17

## 2024-03-16 RX ADMIN — TRAZODONE HYDROCHLORIDE 100 MG: 50 TABLET ORAL at 21:18

## 2024-03-16 RX ADMIN — OLANZAPINE 20 MG: 5 TABLET, FILM COATED ORAL at 21:18

## 2024-03-16 RX ADMIN — BUSPIRONE HYDROCHLORIDE 15 MG: 5 TABLET ORAL at 21:18

## 2024-03-16 RX ADMIN — BENZTROPINE MESYLATE 2 MG: 1 TABLET ORAL at 21:18

## 2024-03-16 RX ADMIN — BUSPIRONE HYDROCHLORIDE 15 MG: 5 TABLET ORAL at 08:08

## 2024-03-16 RX ADMIN — BENZTROPINE MESYLATE 2 MG: 1 TABLET ORAL at 08:08

## 2024-03-16 SDOH — HEALTH STABILITY: MENTAL HEALTH: HALLUCINATION: AUDITORY

## 2024-03-16 SDOH — HEALTH STABILITY: MENTAL HEALTH: RISK OF SUICIDE: NO RISK

## 2024-03-16 SDOH — HEALTH STABILITY: MENTAL HEALTH: SUICIDE ASSESSMENT: ADULT (C-SSRS)

## 2024-03-16 SDOH — HEALTH STABILITY: MENTAL HEALTH: HAVE YOU WISHED YOU WERE DEAD OR WISHED YOU COULD GO TO SLEEP AND NOT WAKE UP?: NO

## 2024-03-16 SDOH — HEALTH STABILITY: MENTAL HEALTH: RISK OF SUICIDE: LOW RISK

## 2024-03-16 SDOH — HEALTH STABILITY: MENTAL HEALTH: HAVE YOU HAD THESE THOUGHTS AND HAD SOME INTENTION OF ACTING ON THEM?: NO

## 2024-03-16 SDOH — HEALTH STABILITY: MENTAL HEALTH: WAS THIS WITHIN THE PAST THREE MONTHS?: NO

## 2024-03-16 SDOH — HEALTH STABILITY: MENTAL HEALTH: HAVE YOU EVER DONE ANYTHING, STARTED TO DO ANYTHING, OR PREPARED TO DO ANYTHING TO END YOUR LIFE?: NO

## 2024-03-16 SDOH — HEALTH STABILITY: MENTAL HEALTH: CONTENT: UNREMARKABLE

## 2024-03-16 SDOH — HEALTH STABILITY: MENTAL HEALTH: HALLUCINATION: COMMAND

## 2024-03-16 SDOH — HEALTH STABILITY: MENTAL HEALTH: HAVE YOU BEEN THINKING ABOUT HOW YOU MIGHT DO THIS?: NO

## 2024-03-16 SDOH — HEALTH STABILITY: MENTAL HEALTH: HAVE YOU WISHED YOU WERE DEAD OR WISHED YOU COULD GO TO SLEEP AND NOT WAKE UP?: YES

## 2024-03-16 SDOH — HEALTH STABILITY: MENTAL HEALTH: HAVE YOU BEEN THINKING ABOUT HOW YOU MIGHT DO THIS?: YES

## 2024-03-16 SDOH — SOCIAL STABILITY: SOCIAL INSECURITY: FAMILY BEHAVIORS: APPROPRIATE FOR SITUATION

## 2024-03-16 SDOH — HEALTH STABILITY: MENTAL HEALTH

## 2024-03-16 SDOH — HEALTH STABILITY: MENTAL HEALTH: BEHAVIORS/MOOD: COOPERATIVE;CALM

## 2024-03-16 SDOH — SOCIAL STABILITY: SOCIAL NETWORK: VISITOR BEHAVIORS: APPROPRIATE FOR SITUATION

## 2024-03-16 SDOH — HEALTH STABILITY: MENTAL HEALTH: BEHAVIORS/MOOD: CALM;COOPERATIVE

## 2024-03-16 SDOH — SOCIAL STABILITY: SOCIAL NETWORK: VISITOR BEHAVIORS: APPROPRIATE FOR SITUATION;CALM;COOPERATIVE

## 2024-03-16 SDOH — HEALTH STABILITY: MENTAL HEALTH: NEEDS EXPRESSED: DENIES

## 2024-03-16 SDOH — HEALTH STABILITY: MENTAL HEALTH: HAVE YOU EVER DONE ANYTHING, STARTED TO DO ANYTHING, OR PREPARED TO DO ANYTHING TO END YOUR LIFE?: YES

## 2024-03-16 SDOH — SOCIAL STABILITY: SOCIAL INSECURITY: FAMILY BEHAVIORS: APPROPRIATE FOR SITUATION;CALM;COOPERATIVE

## 2024-03-16 SDOH — HEALTH STABILITY: MENTAL HEALTH: HAVE YOU ACTUALLY HAD ANY THOUGHTS OF KILLING YOURSELF?: NO

## 2024-03-16 SDOH — HEALTH STABILITY: MENTAL HEALTH: HAVE YOU ACTUALLY HAD ANY THOUGHTS OF KILLING YOURSELF?: YES

## 2024-03-16 SDOH — HEALTH STABILITY: MENTAL HEALTH: RISK OF SUICIDE: HIGH RISK

## 2024-03-16 SDOH — HEALTH STABILITY: MENTAL HEALTH: WAS THIS WITHIN THE PAST THREE MONTHS?: YES

## 2024-03-16 SDOH — HEALTH STABILITY: MENTAL HEALTH: HAVE YOU HAD THESE THOUGHTS AND HAD SOME INTENTION OF ACTING ON THEM?: YES

## 2024-03-16 ASSESSMENT — PAIN SCALES - GENERAL: PAINLEVEL_OUTOF10: 0 - NO PAIN

## 2024-03-16 ASSESSMENT — PAIN - FUNCTIONAL ASSESSMENT: PAIN_FUNCTIONAL_ASSESSMENT: 0-10

## 2024-03-16 NOTE — PROGRESS NOTES
Patient signed out to me at 2300 hrs. for full history and physical please see initial ED provider note.  Patient was [facility] to [facility] for decompensated schizoaffective disorder.  Patient did not require any additional psychiatric medications while under my care and is currently awaiting transport pending accepting physician.    Patient seen and discussed attending physician.    Lb Hanson M.D.  PGY-3 EM

## 2024-03-16 NOTE — SIGNIFICANT EVENT
Application for Emergency Admission      Ready for Transfer?  Is the patient medically cleared for transfer to inpatient psychiatry: Yes  Has the patient been accepted to an inpatient psychiatric hospital: Yes    Application for Emergency Admission  IN ACCORDANCE WITH [place]10 [place]  The Chief Clinical Officer of: Erickson Rinaldi 3/17/2024 .5:21 PM    Reason for Hospitalization  The undersigned has reason to believe that: Shamar Cunningham Is a mentally ill person subject to hospitalization by court order under division B Section 5122.01 of the Revised Code, i.e., this person:    1.Yes  Represents a substantial risk of physical harm to self as manifested by evidence of threats of, or attempts at, suicide or serious self-inflicted bodily harm    2.No Represents a substantial risk of physical harm to others as manifested by evidence of recent homicidal or other violent behavior, evidence of recent threats that place another in reasonable fear of violent behavior and serious physical harm, or other evidence of present dangerousness    3.No Represents a substantial and immediate risk of serious physical impairment or injury to self as manifested by  evidence that the person is unable to provide for and is not providing for the person's basic physical needs because of the person's mental illness and that appropriate provision for those needs cannot be made  immediately available in the community    4.No Would benefit from treatment in a hospital for his mental illness and is in need of such treatment as manifested by evidence of behavior that creates a grave and imminent risk to substantial rights of others or  himself.    5.No Would benefit from treatment as manifested by evidence of behavior that indicates all of the following:       (a) The person is unlikely to survive safely in the community without supervision, based on a clinical determination.       (b) The person has a history of lack of compliance with  treatment for mental illness and one of the following applies:      (i) At least twice within the thirty-six months prior to the filing of an affidavit seeking court-ordered treatment of the person under section 5122.111 of the Revised Code, the lack of compliance has been a significant factor in necessitating hospitalization in a hospital or receipt of services in a forensic or other mental health unit of a correctional facility, provided that the thirty-six-month period shall be extended by the length of any hospitalization or incarceration of the person that occurred within the thirty-six-month period.      (ii) Within the forty-eight months prior to the filing of an affidavit seeking court-ordered treatment of the person under section 5122.111 of the Revised Code, the lack of compliance resulted in one or more acts of serious violent behavior toward self or others or threats of, or attempts at, serious physical harm to self or others, provided that the forty-eight-month period shall be extended by the length of any hospitalization or incarceration of the person that occurred within the forty-eight-month period.      (c) The person, as a result of mental illness, is unlikely to voluntarily participate in necessary treatment.       (d) In view of the person's treatment history and current behavior, the person is in need of treatment in order to prevent a relapse or deterioration that would be likely to result in substantial risk of serious harm to the person or others.    (e) Represents a substantial risk of physical harm to self or others if allowed to remain at liberty pending examination.    Therefore, it is requested that said person be admitted to the above named facility.    STATEMENT OF BELIEF    Must be filled out by one of the following: a psychiatrist, licensed physician, licensed clinical psychologist, health or ,  or .  (Statement shall include the circumstances under  which the individual was taken into custody and the reason for the person's belief that hospitalization is necessary. The statement shall also include a reference to efforts made to secure the individual's property at his residence if he was taken into custody there. Every reasonable and appropriate effort should be made to take this person into custody in the least conspicuous manner possible.)    Per previous provider examinations patient has had command hallucinations that include suicidal ideation in the setting of decompensated schizoaffective disorder.  Patient would benefit from inpatient psychiatric admission for this psychiatric illness.    Jessica Chapman MD 3/17/2024     _____________________________________________________________   Place of Employment: [facility] [employer]    STATEMENT OF OBSERVATION BY PSYCHIATRIST, LICENSED PHYSICIAN, OR LICENSED CLINICAL PSYCHOLOGIST, IF APPLICABLE    Place of Observation (e.g., [facility] mental health center, general hospital, office, emergency facility)  (If applicable, please complete)    Jessica Chapman MD 3/17/2024    _____________________________________________________________

## 2024-03-16 NOTE — PROGRESS NOTES
Patient was handed off to me from the previous team. For full history, physical, and prior ED course, please see previous provider note prior to patient handoff. This is an addendum to the record.    Briefly, this is a 43-year-old male with history of schizoaffective disorder who presents to the emergency department for command hallucinations and suicidal ideation.  Received home medication of 20 mg p.o. Zyprexa, pending EPAT evaluation and recommendations for disposition at time of signout.    Hospital Course/MDM:  EPAT evaluated the patient, pending placement.  Was reordered his home medications.  Remained calm and cooperative throughout my shift, did not require additional as needed medications.  Signed out to oncoming provider pending EPAT to place.    Disposition:  EPAT to place    --  Livia Burleson MD  Emergency Medicine, PGY-3

## 2024-03-17 VITALS
SYSTOLIC BLOOD PRESSURE: 127 MMHG | HEART RATE: 72 BPM | OXYGEN SATURATION: 100 % | BODY MASS INDEX: 27.72 KG/M2 | WEIGHT: 198 LBS | TEMPERATURE: 98.1 F | HEIGHT: 71 IN | DIASTOLIC BLOOD PRESSURE: 82 MMHG | RESPIRATION RATE: 17 BRPM

## 2024-03-17 PROCEDURE — 2500000001 HC RX 250 WO HCPCS SELF ADMINISTERED DRUGS (ALT 637 FOR MEDICARE OP): Mod: SE | Performed by: EMERGENCY MEDICINE

## 2024-03-17 PROCEDURE — 2500000001 HC RX 250 WO HCPCS SELF ADMINISTERED DRUGS (ALT 637 FOR MEDICARE OP): Mod: SE | Performed by: STUDENT IN AN ORGANIZED HEALTH CARE EDUCATION/TRAINING PROGRAM

## 2024-03-17 RX ADMIN — BENZTROPINE MESYLATE 2 MG: 1 TABLET ORAL at 12:18

## 2024-03-17 RX ADMIN — BUSPIRONE HYDROCHLORIDE 15 MG: 5 TABLET ORAL at 12:18

## 2024-03-17 RX ADMIN — METHOCARBAMOL 1000 MG: 500 TABLET ORAL at 00:17

## 2024-03-17 SDOH — HEALTH STABILITY: MENTAL HEALTH: NEEDS EXPRESSED: DENIES

## 2024-03-17 SDOH — HEALTH STABILITY: MENTAL HEALTH: SLEEP PATTERN: SLEEPS ALL NIGHT

## 2024-03-17 SDOH — HEALTH STABILITY: MENTAL HEALTH: WAS THIS WITHIN THE PAST THREE MONTHS?: NO

## 2024-03-17 SDOH — SOCIAL STABILITY: SOCIAL INSECURITY: FAMILY BEHAVIORS: APPROPRIATE FOR SITUATION

## 2024-03-17 SDOH — HEALTH STABILITY: MENTAL HEALTH: HAVE YOU HAD THESE THOUGHTS AND HAD SOME INTENTION OF ACTING ON THEM?: YES

## 2024-03-17 SDOH — HEALTH STABILITY: MENTAL HEALTH: CONTENT: UNREMARKABLE

## 2024-03-17 SDOH — HEALTH STABILITY: MENTAL HEALTH: SUICIDE ASSESSMENT: ADULT (C-SSRS)

## 2024-03-17 SDOH — HEALTH STABILITY: MENTAL HEALTH: HAVE YOU EVER DONE ANYTHING, STARTED TO DO ANYTHING, OR PREPARED TO DO ANYTHING TO END YOUR LIFE?: NO

## 2024-03-17 SDOH — HEALTH STABILITY: MENTAL HEALTH: HAVE YOU BEEN THINKING ABOUT HOW YOU MIGHT DO THIS?: YES

## 2024-03-17 SDOH — HEALTH STABILITY: MENTAL HEALTH

## 2024-03-17 SDOH — HEALTH STABILITY: MENTAL HEALTH: HAVE YOU ACTUALLY HAD ANY THOUGHTS OF KILLING YOURSELF?: YES

## 2024-03-17 SDOH — HEALTH STABILITY: MENTAL HEALTH: BEHAVIORS/MOOD: CALM;COOPERATIVE

## 2024-03-17 SDOH — HEALTH STABILITY: MENTAL HEALTH: HALLUCINATION: COMMAND;AUDITORY

## 2024-03-17 SDOH — SOCIAL STABILITY: SOCIAL NETWORK

## 2024-03-17 SDOH — HEALTH STABILITY: MENTAL HEALTH: HAVE YOU WISHED YOU WERE DEAD OR WISHED YOU COULD GO TO SLEEP AND NOT WAKE UP?: YES

## 2024-03-17 SDOH — SOCIAL STABILITY: SOCIAL NETWORK: VISITOR BEHAVIORS: APPROPRIATE FOR SITUATION

## 2024-03-17 SDOH — HEALTH STABILITY: MENTAL HEALTH: RISK OF SUICIDE: HIGH RISK

## 2024-04-04 ENCOUNTER — HOSPITAL ENCOUNTER (EMERGENCY)
Facility: HOSPITAL | Age: 44
Discharge: HOME | End: 2024-04-04
Attending: EMERGENCY MEDICINE
Payer: COMMERCIAL

## 2024-04-04 ENCOUNTER — CLINICAL SUPPORT (OUTPATIENT)
Dept: EMERGENCY MEDICINE | Facility: HOSPITAL | Age: 44
End: 2024-04-04
Payer: COMMERCIAL

## 2024-04-04 VITALS
HEIGHT: 71 IN | BODY MASS INDEX: 28 KG/M2 | TEMPERATURE: 97.6 F | RESPIRATION RATE: 18 BRPM | DIASTOLIC BLOOD PRESSURE: 81 MMHG | HEART RATE: 64 BPM | OXYGEN SATURATION: 98 % | SYSTOLIC BLOOD PRESSURE: 156 MMHG | WEIGHT: 200 LBS

## 2024-04-04 DIAGNOSIS — Z76.89 ENCOUNTER FOR PSYCHIATRIC ASSESSMENT: Primary | ICD-10-CM

## 2024-04-04 LAB
ALBUMIN SERPL BCP-MCNC: 4.6 G/DL (ref 3.4–5)
ALP SERPL-CCNC: 71 U/L (ref 33–120)
ALT SERPL W P-5'-P-CCNC: 15 U/L (ref 10–52)
AMPHETAMINES UR QL SCN: ABNORMAL
ANION GAP SERPL CALC-SCNC: 15 MMOL/L (ref 10–20)
APAP SERPL-MCNC: <10 UG/ML
APPEARANCE UR: ABNORMAL
AST SERPL W P-5'-P-CCNC: 20 U/L (ref 9–39)
ATRIAL RATE: 60 BPM
BARBITURATES UR QL SCN: ABNORMAL
BASOPHILS # BLD AUTO: 0.03 X10*3/UL (ref 0–0.1)
BASOPHILS NFR BLD AUTO: 0.6 %
BENZODIAZ UR QL SCN: ABNORMAL
BILIRUB SERPL-MCNC: 0.5 MG/DL (ref 0–1.2)
BILIRUB UR STRIP.AUTO-MCNC: NEGATIVE MG/DL
BUN SERPL-MCNC: 10 MG/DL (ref 6–23)
BZE UR QL SCN: ABNORMAL
CALCIUM SERPL-MCNC: 9.9 MG/DL (ref 8.6–10.6)
CANNABINOIDS UR QL SCN: ABNORMAL
CHLORIDE SERPL-SCNC: 105 MMOL/L (ref 98–107)
CO2 SERPL-SCNC: 24 MMOL/L (ref 21–32)
COLOR UR: YELLOW
CREAT SERPL-MCNC: 1.1 MG/DL (ref 0.5–1.3)
EGFRCR SERPLBLD CKD-EPI 2021: 85 ML/MIN/1.73M*2
EOSINOPHIL # BLD AUTO: 0.06 X10*3/UL (ref 0–0.7)
EOSINOPHIL NFR BLD AUTO: 1.1 %
ERYTHROCYTE [DISTWIDTH] IN BLOOD BY AUTOMATED COUNT: 12.1 % (ref 11.5–14.5)
ETHANOL SERPL-MCNC: <10 MG/DL
FENTANYL+NORFENTANYL UR QL SCN: ABNORMAL
FLUAV RNA RESP QL NAA+PROBE: NOT DETECTED
FLUBV RNA RESP QL NAA+PROBE: NOT DETECTED
GLUCOSE SERPL-MCNC: 85 MG/DL (ref 74–99)
GLUCOSE UR STRIP.AUTO-MCNC: NORMAL MG/DL
HCT VFR BLD AUTO: 44.3 % (ref 41–52)
HGB BLD-MCNC: 16.1 G/DL (ref 13.5–17.5)
IMM GRANULOCYTES # BLD AUTO: 0.01 X10*3/UL (ref 0–0.7)
IMM GRANULOCYTES NFR BLD AUTO: 0.2 % (ref 0–0.9)
KETONES UR STRIP.AUTO-MCNC: NEGATIVE MG/DL
LEUKOCYTE ESTERASE UR QL STRIP.AUTO: NEGATIVE
LYMPHOCYTES # BLD AUTO: 2.72 X10*3/UL (ref 1.2–4.8)
LYMPHOCYTES NFR BLD AUTO: 50.1 %
MCH RBC QN AUTO: 31.4 PG (ref 26–34)
MCHC RBC AUTO-ENTMCNC: 36.3 G/DL (ref 32–36)
MCV RBC AUTO: 86 FL (ref 80–100)
METHADONE UR QL SCN: ABNORMAL
MONOCYTES # BLD AUTO: 0.43 X10*3/UL (ref 0.1–1)
MONOCYTES NFR BLD AUTO: 7.9 %
MUCOUS THREADS #/AREA URNS AUTO: NORMAL /LPF
NEUTROPHILS # BLD AUTO: 2.18 X10*3/UL (ref 1.2–7.7)
NEUTROPHILS NFR BLD AUTO: 40.1 %
NITRITE UR QL STRIP.AUTO: NEGATIVE
NRBC BLD-RTO: 0 /100 WBCS (ref 0–0)
OPIATES UR QL SCN: ABNORMAL
OXYCODONE+OXYMORPHONE UR QL SCN: ABNORMAL
P AXIS: 45 DEGREES
P OFFSET: 202 MS
P ONSET: 148 MS
PCP UR QL SCN: ABNORMAL
PH UR STRIP.AUTO: 6.5 [PH]
PLATELET # BLD AUTO: 285 X10*3/UL (ref 150–450)
POTASSIUM SERPL-SCNC: 4.4 MMOL/L (ref 3.5–5.3)
PR INTERVAL: 142 MS
PROT SERPL-MCNC: 7.8 G/DL (ref 6.4–8.2)
PROT UR STRIP.AUTO-MCNC: ABNORMAL MG/DL
Q ONSET: 219 MS
QRS COUNT: 10 BEATS
QRS DURATION: 78 MS
QT INTERVAL: 374 MS
QTC CALCULATION(BAZETT): 374 MS
QTC FREDERICIA: 374 MS
R AXIS: 42 DEGREES
RBC # BLD AUTO: 5.13 X10*6/UL (ref 4.5–5.9)
RBC # UR STRIP.AUTO: NEGATIVE /UL
RBC #/AREA URNS AUTO: NORMAL /HPF
SALICYLATES SERPL-MCNC: <3 MG/DL
SARS-COV-2 RNA RESP QL NAA+PROBE: NOT DETECTED
SODIUM SERPL-SCNC: 140 MMOL/L (ref 136–145)
SP GR UR STRIP.AUTO: 1.02
SQUAMOUS #/AREA URNS AUTO: NORMAL /HPF
T AXIS: 49 DEGREES
T OFFSET: 406 MS
UROBILINOGEN UR STRIP.AUTO-MCNC: NORMAL MG/DL
VENTRICULAR RATE: 60 BPM
WBC # BLD AUTO: 5.4 X10*3/UL (ref 4.4–11.3)
WBC #/AREA URNS AUTO: NORMAL /HPF

## 2024-04-04 PROCEDURE — 87636 SARSCOV2 & INF A&B AMP PRB: CPT

## 2024-04-04 PROCEDURE — 99285 EMERGENCY DEPT VISIT HI MDM: CPT | Mod: 25

## 2024-04-04 PROCEDURE — 85025 COMPLETE CBC W/AUTO DIFF WBC: CPT

## 2024-04-04 PROCEDURE — 80307 DRUG TEST PRSMV CHEM ANLYZR: CPT

## 2024-04-04 PROCEDURE — 80320 DRUG SCREEN QUANTALCOHOLS: CPT

## 2024-04-04 PROCEDURE — 80053 COMPREHEN METABOLIC PANEL: CPT

## 2024-04-04 PROCEDURE — 93005 ELECTROCARDIOGRAM TRACING: CPT

## 2024-04-04 PROCEDURE — 36415 COLL VENOUS BLD VENIPUNCTURE: CPT

## 2024-04-04 PROCEDURE — 81001 URINALYSIS AUTO W/SCOPE: CPT

## 2024-04-04 PROCEDURE — 99285 EMERGENCY DEPT VISIT HI MDM: CPT

## 2024-04-04 RX ORDER — OLANZAPINE 20 MG/1
20 TABLET, ORALLY DISINTEGRATING ORAL NIGHTLY
Qty: 30 TABLET | Refills: 0 | Status: SHIPPED | OUTPATIENT
Start: 2024-04-04 | End: 2024-05-04

## 2024-04-04 SDOH — HEALTH STABILITY: MENTAL HEALTH: BEHAVIORS/MOOD: AGITATED

## 2024-04-04 ASSESSMENT — COLUMBIA-SUICIDE SEVERITY RATING SCALE - C-SSRS
1. IN THE PAST MONTH, HAVE YOU WISHED YOU WERE DEAD OR WISHED YOU COULD GO TO SLEEP AND NOT WAKE UP?: YES
4. HAVE YOU HAD THESE THOUGHTS AND HAD SOME INTENTION OF ACTING ON THEM?: YES
2. HAVE YOU ACTUALLY HAD ANY THOUGHTS OF KILLING YOURSELF?: YES
6. HAVE YOU EVER DONE ANYTHING, STARTED TO DO ANYTHING, OR PREPARED TO DO ANYTHING TO END YOUR LIFE?: YES
5. HAVE YOU STARTED TO WORK OUT OR WORKED OUT THE DETAILS OF HOW TO KILL YOURSELF? DO YOU INTEND TO CARRY OUT THIS PLAN?: YES
6. HAVE YOU EVER DONE ANYTHING, STARTED TO DO ANYTHING, OR PREPARED TO DO ANYTHING TO END YOUR LIFE?: YES

## 2024-04-04 ASSESSMENT — PAIN SCALES - GENERAL: PAINLEVEL_OUTOF10: 0 - NO PAIN

## 2024-04-04 ASSESSMENT — LIFESTYLE VARIABLES
HAVE PEOPLE ANNOYED YOU BY CRITICIZING YOUR DRINKING: NO
TOTAL SCORE: 0
EVER HAD A DRINK FIRST THING IN THE MORNING TO STEADY YOUR NERVES TO GET RID OF A HANGOVER: NO
EVER FELT BAD OR GUILTY ABOUT YOUR DRINKING: NO
HAVE YOU EVER FELT YOU SHOULD CUT DOWN ON YOUR DRINKING: NO

## 2024-04-04 ASSESSMENT — PAIN - FUNCTIONAL ASSESSMENT: PAIN_FUNCTIONAL_ASSESSMENT: 0-10
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
## 2024-04-04 NOTE — ED TRIAGE NOTES
Pt brought in by [place] [employer] c/o suicidal ideations. Pt states that the people where he lives are trying to hurt/kill him and he wants to kill himself before they do. Pt plans to jump into traffic. Pt also mentioned that his best friend and his brother recently passed and he is very emotional about that. Pt is requesting to be pink slipped and would like to go back to [facility] where he was a month ago because he feels safe and wants to go back on his medications. Pt also states he is having visual/auditory hallucinations but would not elaborate on what exactly he is seeing or hearing. Pt vitals stable, 2 green bags collected

## 2024-04-05 LAB — HOLD SPECIMEN: NORMAL

## 2024-04-17 ENCOUNTER — CLINICAL SUPPORT (OUTPATIENT)
Dept: EMERGENCY MEDICINE | Facility: HOSPITAL | Age: 44
End: 2024-04-17
Payer: COMMERCIAL

## 2024-04-17 ENCOUNTER — HOSPITAL ENCOUNTER (EMERGENCY)
Facility: HOSPITAL | Age: 44
Discharge: HOME | End: 2024-04-18
Attending: EMERGENCY MEDICINE
Payer: COMMERCIAL

## 2024-04-17 DIAGNOSIS — Z86.59 HISTORY OF SCHIZOAFFECTIVE DISORDER: Primary | ICD-10-CM

## 2024-04-17 LAB
ALBUMIN SERPL BCP-MCNC: 4.3 G/DL (ref 3.4–5)
ALP SERPL-CCNC: 60 U/L (ref 33–120)
ALT SERPL W P-5'-P-CCNC: 15 U/L (ref 10–52)
ANION GAP SERPL CALC-SCNC: 14 MMOL/L (ref 10–20)
APAP SERPL-MCNC: <10 UG/ML
APPEARANCE UR: CLEAR
AST SERPL W P-5'-P-CCNC: 24 U/L (ref 9–39)
ATRIAL RATE: 62 BPM
BASOPHILS # BLD AUTO: 0.04 X10*3/UL (ref 0–0.1)
BASOPHILS NFR BLD AUTO: 0.6 %
BILIRUB SERPL-MCNC: 0.6 MG/DL (ref 0–1.2)
BILIRUB UR STRIP.AUTO-MCNC: NEGATIVE MG/DL
BUN SERPL-MCNC: 19 MG/DL (ref 6–23)
CALCIUM SERPL-MCNC: 9.7 MG/DL (ref 8.6–10.6)
CHLORIDE SERPL-SCNC: 106 MMOL/L (ref 98–107)
CO2 SERPL-SCNC: 25 MMOL/L (ref 21–32)
COLOR UR: YELLOW
CREAT SERPL-MCNC: 1.21 MG/DL (ref 0.5–1.3)
EGFRCR SERPLBLD CKD-EPI 2021: 76 ML/MIN/1.73M*2
EOSINOPHIL # BLD AUTO: 0.07 X10*3/UL (ref 0–0.7)
EOSINOPHIL NFR BLD AUTO: 1.1 %
ERYTHROCYTE [DISTWIDTH] IN BLOOD BY AUTOMATED COUNT: 12.2 % (ref 11.5–14.5)
ETHANOL SERPL-MCNC: <10 MG/DL
GLUCOSE SERPL-MCNC: 75 MG/DL (ref 74–99)
GLUCOSE UR STRIP.AUTO-MCNC: NORMAL MG/DL
HCT VFR BLD AUTO: 42.6 % (ref 41–52)
HGB BLD-MCNC: 15.5 G/DL (ref 13.5–17.5)
HOLD SPECIMEN: NORMAL
IMM GRANULOCYTES # BLD AUTO: 0.01 X10*3/UL (ref 0–0.7)
IMM GRANULOCYTES NFR BLD AUTO: 0.2 % (ref 0–0.9)
KETONES UR STRIP.AUTO-MCNC: ABNORMAL MG/DL
LEUKOCYTE ESTERASE UR QL STRIP.AUTO: NEGATIVE
LYMPHOCYTES # BLD AUTO: 2.99 X10*3/UL (ref 1.2–4.8)
LYMPHOCYTES NFR BLD AUTO: 46.4 %
MCH RBC QN AUTO: 31.8 PG (ref 26–34)
MCHC RBC AUTO-ENTMCNC: 36.4 G/DL (ref 32–36)
MCV RBC AUTO: 88 FL (ref 80–100)
MONOCYTES # BLD AUTO: 0.41 X10*3/UL (ref 0.1–1)
MONOCYTES NFR BLD AUTO: 6.4 %
MUCOUS THREADS #/AREA URNS AUTO: NORMAL /LPF
NEUTROPHILS # BLD AUTO: 2.93 X10*3/UL (ref 1.2–7.7)
NEUTROPHILS NFR BLD AUTO: 45.3 %
NITRITE UR QL STRIP.AUTO: NEGATIVE
NRBC BLD-RTO: 0 /100 WBCS (ref 0–0)
P AXIS: 52 DEGREES
P OFFSET: 195 MS
P ONSET: 149 MS
PH UR STRIP.AUTO: 5.5 [PH]
PLATELET # BLD AUTO: 280 X10*3/UL (ref 150–450)
POTASSIUM SERPL-SCNC: 4 MMOL/L (ref 3.5–5.3)
PR INTERVAL: 136 MS
PROT SERPL-MCNC: 7.5 G/DL (ref 6.4–8.2)
PROT UR STRIP.AUTO-MCNC: ABNORMAL MG/DL
Q ONSET: 217 MS
QRS COUNT: 10 BEATS
QRS DURATION: 82 MS
QT INTERVAL: 374 MS
QTC CALCULATION(BAZETT): 379 MS
QTC FREDERICIA: 378 MS
R AXIS: 50 DEGREES
RBC # BLD AUTO: 4.87 X10*6/UL (ref 4.5–5.9)
RBC # UR STRIP.AUTO: NEGATIVE /UL
RBC #/AREA URNS AUTO: NORMAL /HPF
SALICYLATES SERPL-MCNC: <3 MG/DL
SARS-COV-2 RNA RESP QL NAA+PROBE: NOT DETECTED
SODIUM SERPL-SCNC: 141 MMOL/L (ref 136–145)
SP GR UR STRIP.AUTO: 1.04
SQUAMOUS #/AREA URNS AUTO: NORMAL /HPF
T AXIS: 46 DEGREES
T OFFSET: 404 MS
UROBILINOGEN UR STRIP.AUTO-MCNC: ABNORMAL MG/DL
VENTRICULAR RATE: 62 BPM
WBC # BLD AUTO: 6.5 X10*3/UL (ref 4.4–11.3)
WBC #/AREA URNS AUTO: NORMAL /HPF

## 2024-04-17 PROCEDURE — 99285 EMERGENCY DEPT VISIT HI MDM: CPT | Mod: 25

## 2024-04-17 PROCEDURE — 2500000006 HC RX 250 W HCPCS SELF ADMINISTERED DRUGS (ALT 637 FOR ALL PAYERS): Mod: SE | Performed by: PHYSICIAN ASSISTANT

## 2024-04-17 PROCEDURE — 87635 SARS-COV-2 COVID-19 AMP PRB: CPT | Performed by: PHYSICIAN ASSISTANT

## 2024-04-17 PROCEDURE — 85025 COMPLETE CBC W/AUTO DIFF WBC: CPT | Performed by: PHYSICIAN ASSISTANT

## 2024-04-17 PROCEDURE — 36415 COLL VENOUS BLD VENIPUNCTURE: CPT | Performed by: PHYSICIAN ASSISTANT

## 2024-04-17 PROCEDURE — 99285 EMERGENCY DEPT VISIT HI MDM: CPT | Performed by: PHYSICIAN ASSISTANT

## 2024-04-17 PROCEDURE — 81001 URINALYSIS AUTO W/SCOPE: CPT | Performed by: PHYSICIAN ASSISTANT

## 2024-04-17 PROCEDURE — 80307 DRUG TEST PRSMV CHEM ANLYZR: CPT | Performed by: PHYSICIAN ASSISTANT

## 2024-04-17 PROCEDURE — 93005 ELECTROCARDIOGRAM TRACING: CPT

## 2024-04-17 PROCEDURE — 93010 ELECTROCARDIOGRAM REPORT: CPT | Performed by: PHYSICIAN ASSISTANT

## 2024-04-17 PROCEDURE — 80143 DRUG ASSAY ACETAMINOPHEN: CPT | Performed by: PHYSICIAN ASSISTANT

## 2024-04-17 PROCEDURE — 84075 ASSAY ALKALINE PHOSPHATASE: CPT | Performed by: PHYSICIAN ASSISTANT

## 2024-04-17 RX ORDER — OLANZAPINE 5 MG/1
5 TABLET ORAL ONCE
Status: DISCONTINUED | OUTPATIENT
Start: 2024-04-17 | End: 2024-04-18 | Stop reason: HOSPADM

## 2024-04-17 RX ORDER — OLANZAPINE 5 MG/1
20 TABLET ORAL ONCE
Status: COMPLETED | OUTPATIENT
Start: 2024-04-17 | End: 2024-04-17

## 2024-04-17 RX ADMIN — OLANZAPINE 20 MG: 5 TABLET, FILM COATED ORAL at 17:20

## 2024-04-17 SDOH — HEALTH STABILITY: MENTAL HEALTH: HAVE YOU WISHED YOU WERE DEAD OR WISHED YOU COULD GO TO SLEEP AND NOT WAKE UP?: YES

## 2024-04-17 SDOH — HEALTH STABILITY: MENTAL HEALTH: HAVE YOU ACTUALLY HAD ANY THOUGHTS OF KILLING YOURSELF?: YES

## 2024-04-17 SDOH — SOCIAL STABILITY: SOCIAL NETWORK: PARENT/GUARDIAN/SIGNIFICANT OTHER INVOLVEMENT: NO INVOLVEMENT

## 2024-04-17 SDOH — HEALTH STABILITY: MENTAL HEALTH: HAVE YOU EVER DONE ANYTHING, STARTED TO DO ANYTHING, OR PREPARED TO DO ANYTHING TO END YOUR LIFE?: YES

## 2024-04-17 SDOH — HEALTH STABILITY: MENTAL HEALTH: CONTENT: BLAMING SELF

## 2024-04-17 SDOH — HEALTH STABILITY: MENTAL HEALTH: HAVE YOU HAD THESE THOUGHTS AND HAD SOME INTENTION OF ACTING ON THEM?: YES

## 2024-04-17 SDOH — HEALTH STABILITY: MENTAL HEALTH: HAVE YOU BEEN THINKING ABOUT HOW YOU MIGHT DO THIS?: YES

## 2024-04-17 SDOH — HEALTH STABILITY: MENTAL HEALTH
HAVE YOU STARTED TO WORK OUT OR WORKED OUT THE DETAILS OF HOW TO KILL YOURSELF? DO YOU INTENT TO CARRY OUT THIS PLAN?: YES

## 2024-04-17 SDOH — HEALTH STABILITY: MENTAL HEALTH: HALLUCINATION: AUDITORY;COMMAND;VISUAL

## 2024-04-17 SDOH — HEALTH STABILITY: MENTAL HEALTH: WAS THIS WITHIN THE PAST THREE MONTHS?: YES

## 2024-04-17 SDOH — HEALTH STABILITY: MENTAL HEALTH: BEHAVIORS/MOOD: COOPERATIVE;CRYING;DELUSIONS;HALLUCINATIONS

## 2024-04-17 SDOH — HEALTH STABILITY: MENTAL HEALTH: NEEDS EXPRESSED: EMOTIONAL

## 2024-04-17 SDOH — HEALTH STABILITY: MENTAL HEALTH: SUICIDE ASSESSMENT: ADULT (C-SSRS)

## 2024-04-17 ASSESSMENT — LIFESTYLE VARIABLES
HAVE YOU EVER FELT YOU SHOULD CUT DOWN ON YOUR DRINKING: YES
EVER HAD A DRINK FIRST THING IN THE MORNING TO STEADY YOUR NERVES TO GET RID OF A HANGOVER: YES
TOTAL SCORE: 4
EVER FELT BAD OR GUILTY ABOUT YOUR DRINKING: YES
HAVE PEOPLE ANNOYED YOU BY CRITICIZING YOUR DRINKING: YES

## 2024-04-17 ASSESSMENT — COLUMBIA-SUICIDE SEVERITY RATING SCALE - C-SSRS
6. HAVE YOU EVER DONE ANYTHING, STARTED TO DO ANYTHING, OR PREPARED TO DO ANYTHING TO END YOUR LIFE?: NO
6. HAVE YOU EVER DONE ANYTHING, STARTED TO DO ANYTHING, OR PREPARED TO DO ANYTHING TO END YOUR LIFE?: NO
4. HAVE YOU HAD THESE THOUGHTS AND HAD SOME INTENTION OF ACTING ON THEM?: YES
1. IN THE PAST MONTH, HAVE YOU WISHED YOU WERE DEAD OR WISHED YOU COULD GO TO SLEEP AND NOT WAKE UP?: YES
2. HAVE YOU ACTUALLY HAD ANY THOUGHTS OF KILLING YOURSELF?: YES
5. HAVE YOU STARTED TO WORK OUT OR WORKED OUT THE DETAILS OF HOW TO KILL YOURSELF? DO YOU INTEND TO CARRY OUT THIS PLAN?: YES

## 2024-04-17 ASSESSMENT — PAIN SCALES - GENERAL
PAINLEVEL_OUTOF10: 0 - NO PAIN
PAINLEVEL_OUTOF10: 0 - NO PAIN

## 2024-04-17 ASSESSMENT — PAIN - FUNCTIONAL ASSESSMENT: PAIN_FUNCTIONAL_ASSESSMENT: 0-10

## 2024-04-17 ASSESSMENT — PAIN DESCRIPTION - PROGRESSION: CLINICAL_PROGRESSION: NOT CHANGED
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
## 2024-04-17 NOTE — ED TRIAGE NOTES
Pt says he doesn't feel like living anymore so he wants to jump in [facility], denies SI/HI/AH/VH

## 2024-04-18 VITALS
DIASTOLIC BLOOD PRESSURE: 74 MMHG | HEIGHT: 71 IN | HEART RATE: 67 BPM | RESPIRATION RATE: 14 BRPM | TEMPERATURE: 97.7 F | OXYGEN SATURATION: 96 % | WEIGHT: 199.96 LBS | SYSTOLIC BLOOD PRESSURE: 118 MMHG | BODY MASS INDEX: 27.99 KG/M2

## 2024-04-18 LAB
AMPHETAMINES UR QL SCN: ABNORMAL
BARBITURATES UR QL SCN: ABNORMAL
BENZODIAZ UR QL SCN: ABNORMAL
BZE UR QL SCN: ABNORMAL
CANNABINOIDS UR QL SCN: ABNORMAL
FENTANYL+NORFENTANYL UR QL SCN: ABNORMAL
HOLD SPECIMEN: NORMAL
METHADONE UR QL SCN: ABNORMAL
OPIATES UR QL SCN: ABNORMAL
OXYCODONE+OXYMORPHONE UR QL SCN: ABNORMAL
PCP UR QL SCN: ABNORMAL

## 2024-05-17 ENCOUNTER — CLINICAL SUPPORT (OUTPATIENT)
Dept: EMERGENCY MEDICINE | Facility: HOSPITAL | Age: 44
End: 2024-05-17
Payer: COMMERCIAL

## 2024-05-17 ENCOUNTER — HOSPITAL ENCOUNTER (EMERGENCY)
Facility: HOSPITAL | Age: 44
Discharge: OTHER INSTITUTION | End: 2024-05-19
Attending: EMERGENCY MEDICINE
Payer: COMMERCIAL

## 2024-05-17 DIAGNOSIS — K08.89 PAIN, DENTAL: Primary | ICD-10-CM

## 2024-05-17 LAB
ALBUMIN SERPL BCP-MCNC: 4.2 G/DL (ref 3.4–5)
ALP SERPL-CCNC: 60 U/L (ref 33–120)
ALT SERPL W P-5'-P-CCNC: 11 U/L (ref 10–52)
AMPHETAMINES UR QL SCN: ABNORMAL
ANION GAP SERPL CALC-SCNC: 10 MMOL/L (ref 10–20)
APAP SERPL-MCNC: <10 UG/ML
AST SERPL W P-5'-P-CCNC: 21 U/L (ref 9–39)
ATRIAL RATE: 61 BPM
BARBITURATES UR QL SCN: ABNORMAL
BASOPHILS # BLD AUTO: 0.04 X10*3/UL (ref 0–0.1)
BASOPHILS NFR BLD AUTO: 0.8 %
BENZODIAZ UR QL SCN: ABNORMAL
BILIRUB SERPL-MCNC: 0.4 MG/DL (ref 0–1.2)
BUN SERPL-MCNC: 8 MG/DL (ref 6–23)
BZE UR QL SCN: ABNORMAL
CALCIUM SERPL-MCNC: 9.4 MG/DL (ref 8.6–10.6)
CANNABINOIDS UR QL SCN: ABNORMAL
CHLORIDE SERPL-SCNC: 104 MMOL/L (ref 98–107)
CK SERPL-CCNC: 175 U/L (ref 0–325)
CO2 SERPL-SCNC: 29 MMOL/L (ref 21–32)
CREAT SERPL-MCNC: 1.08 MG/DL (ref 0.5–1.3)
EGFRCR SERPLBLD CKD-EPI 2021: 87 ML/MIN/1.73M*2
EOSINOPHIL # BLD AUTO: 0.07 X10*3/UL (ref 0–0.7)
EOSINOPHIL NFR BLD AUTO: 1.3 %
ERYTHROCYTE [DISTWIDTH] IN BLOOD BY AUTOMATED COUNT: 12.6 % (ref 11.5–14.5)
ETHANOL SERPL-MCNC: <10 MG/DL
FENTANYL+NORFENTANYL UR QL SCN: ABNORMAL
GLUCOSE SERPL-MCNC: 73 MG/DL (ref 74–99)
HCT VFR BLD AUTO: 42 % (ref 41–52)
HGB BLD-MCNC: 15 G/DL (ref 13.5–17.5)
IMM GRANULOCYTES # BLD AUTO: 0.01 X10*3/UL (ref 0–0.7)
IMM GRANULOCYTES NFR BLD AUTO: 0.2 % (ref 0–0.9)
LYMPHOCYTES # BLD AUTO: 2.64 X10*3/UL (ref 1.2–4.8)
LYMPHOCYTES NFR BLD AUTO: 50.4 %
MCH RBC QN AUTO: 31.4 PG (ref 26–34)
MCHC RBC AUTO-ENTMCNC: 35.7 G/DL (ref 32–36)
MCV RBC AUTO: 88 FL (ref 80–100)
METHADONE UR QL SCN: ABNORMAL
MONOCYTES # BLD AUTO: 0.42 X10*3/UL (ref 0.1–1)
MONOCYTES NFR BLD AUTO: 8 %
NEUTROPHILS # BLD AUTO: 2.06 X10*3/UL (ref 1.2–7.7)
NEUTROPHILS NFR BLD AUTO: 39.3 %
NRBC BLD-RTO: 0 /100 WBCS (ref 0–0)
OPIATES UR QL SCN: ABNORMAL
OXYCODONE+OXYMORPHONE UR QL SCN: ABNORMAL
P AXIS: 69 DEGREES
P OFFSET: 203 MS
P ONSET: 155 MS
PCP UR QL SCN: ABNORMAL
PLATELET # BLD AUTO: 253 X10*3/UL (ref 150–450)
POTASSIUM SERPL-SCNC: 4.2 MMOL/L (ref 3.5–5.3)
PR INTERVAL: 132 MS
PROT SERPL-MCNC: 7.1 G/DL (ref 6.4–8.2)
Q ONSET: 221 MS
QRS COUNT: 10 BEATS
QRS DURATION: 84 MS
QT INTERVAL: 384 MS
QTC CALCULATION(BAZETT): 386 MS
QTC FREDERICIA: 385 MS
R AXIS: 55 DEGREES
RBC # BLD AUTO: 4.77 X10*6/UL (ref 4.5–5.9)
SALICYLATES SERPL-MCNC: <3 MG/DL
SARS-COV-2 RNA RESP QL NAA+PROBE: NOT DETECTED
SODIUM SERPL-SCNC: 139 MMOL/L (ref 136–145)
T AXIS: 37 DEGREES
T OFFSET: 413 MS
VENTRICULAR RATE: 61 BPM
WBC # BLD AUTO: 5.2 X10*3/UL (ref 4.4–11.3)

## 2024-05-17 PROCEDURE — 99285 EMERGENCY DEPT VISIT HI MDM: CPT | Mod: 25

## 2024-05-17 PROCEDURE — 2500000006 HC RX 250 W HCPCS SELF ADMINISTERED DRUGS (ALT 637 FOR ALL PAYERS): Mod: SE | Performed by: PHYSICIAN ASSISTANT

## 2024-05-17 PROCEDURE — 93005 ELECTROCARDIOGRAM TRACING: CPT

## 2024-05-17 PROCEDURE — 82550 ASSAY OF CK (CPK): CPT | Performed by: PHYSICIAN ASSISTANT

## 2024-05-17 PROCEDURE — 84075 ASSAY ALKALINE PHOSPHATASE: CPT | Performed by: PHYSICIAN ASSISTANT

## 2024-05-17 PROCEDURE — 80143 DRUG ASSAY ACETAMINOPHEN: CPT | Performed by: PHYSICIAN ASSISTANT

## 2024-05-17 PROCEDURE — 85025 COMPLETE CBC W/AUTO DIFF WBC: CPT | Performed by: PHYSICIAN ASSISTANT

## 2024-05-17 PROCEDURE — 36415 COLL VENOUS BLD VENIPUNCTURE: CPT | Performed by: PHYSICIAN ASSISTANT

## 2024-05-17 PROCEDURE — 99285 EMERGENCY DEPT VISIT HI MDM: CPT | Performed by: PHYSICIAN ASSISTANT

## 2024-05-17 PROCEDURE — 80307 DRUG TEST PRSMV CHEM ANLYZR: CPT | Performed by: PHYSICIAN ASSISTANT

## 2024-05-17 PROCEDURE — 87635 SARS-COV-2 COVID-19 AMP PRB: CPT | Performed by: PHYSICIAN ASSISTANT

## 2024-05-17 PROCEDURE — 2500000001 HC RX 250 WO HCPCS SELF ADMINISTERED DRUGS (ALT 637 FOR MEDICARE OP): Mod: SE | Performed by: PHYSICIAN ASSISTANT

## 2024-05-17 PROCEDURE — 93010 ELECTROCARDIOGRAM REPORT: CPT | Performed by: PHYSICIAN ASSISTANT

## 2024-05-17 RX ORDER — BENZTROPINE MESYLATE 1 MG/1
2 TABLET ORAL ONCE
Status: COMPLETED | OUTPATIENT
Start: 2024-05-17 | End: 2024-05-17

## 2024-05-17 RX ORDER — OLANZAPINE 10 MG/1
20 TABLET, ORALLY DISINTEGRATING ORAL ONCE
Status: COMPLETED | OUTPATIENT
Start: 2024-05-17 | End: 2024-05-17

## 2024-05-17 RX ADMIN — BUSPIRONE HYDROCHLORIDE 15 MG: 10 TABLET ORAL at 14:05

## 2024-05-17 RX ADMIN — OLANZAPINE 20 MG: 10 TABLET, ORALLY DISINTEGRATING ORAL at 13:30

## 2024-05-17 RX ADMIN — BENZTROPINE MESYLATE 2 MG: 1 TABLET ORAL at 14:05

## 2024-05-17 SDOH — ECONOMIC STABILITY: HOUSING INSECURITY: FEELS SAFE LIVING IN HOME: NO

## 2024-05-17 SDOH — HEALTH STABILITY: MENTAL HEALTH: IN THE PAST WEEK, HAVE YOU BEEN HAVING THOUGHTS ABOUT KILLING YOURSELF?: YES

## 2024-05-17 SDOH — HEALTH STABILITY: MENTAL HEALTH

## 2024-05-17 SDOH — HEALTH STABILITY: MENTAL HEALTH: HAVE YOU EVER TRIED TO KILL YOURSELF?: YES

## 2024-05-17 SDOH — HEALTH STABILITY: MENTAL HEALTH: NON-SPECIFIC ACTIVE SUICIDAL THOUGHTS (PAST 1 MONTH): YES

## 2024-05-17 SDOH — HEALTH STABILITY: MENTAL HEALTH: SUICIDAL BEHAVIOR (LIFETIME): YES

## 2024-05-17 SDOH — HEALTH STABILITY: MENTAL HEALTH: ARE YOU HAVING THOUGHTS OF KILLING YOURSELF RIGHT NOW?: YES

## 2024-05-17 SDOH — HEALTH STABILITY: MENTAL HEALTH: ACTIVE SUICIDAL IDEATION WITH SPECIFIC PLAN AND INTENT (PAST 1 MONTH): YES

## 2024-05-17 SDOH — HEALTH STABILITY: MENTAL HEALTH: SUICIDAL BEHAVIOR (3 MONTHS): YES

## 2024-05-17 SDOH — HEALTH STABILITY: MENTAL HEALTH: IN THE PAST FEW WEEKS, HAVE YOU FELT THAT YOU OR YOUR FAMILY WOULD BE BETTER OFF IF YOU WERE DEAD?: NO

## 2024-05-17 SDOH — HEALTH STABILITY: MENTAL HEALTH: WISH TO BE DEAD (PAST 1 MONTH): YES

## 2024-05-17 SDOH — HEALTH STABILITY: MENTAL HEALTH: ANXIETY SYMPTOMS: NO PROBLEMS REPORTED OR OBSERVED.

## 2024-05-17 SDOH — HEALTH STABILITY: MENTAL HEALTH: IN THE PAST FEW WEEKS, HAVE YOU WISHED YOU WERE DEAD?: YES

## 2024-05-17 SDOH — HEALTH STABILITY: MENTAL HEALTH: DEPRESSION SYMPTOMS: NO PROBLEMS REPORTED OR OBSERVED.

## 2024-05-17 SDOH — HEALTH STABILITY: MENTAL HEALTH: ACTIVE SUICIDAL IDEATION WITH SOME INTENT TO ACT, WITHOUT SPECIFIC PLAN (PAST 1 MONTH): YES

## 2024-05-17 ASSESSMENT — COLUMBIA-SUICIDE SEVERITY RATING SCALE - C-SSRS
6. HAVE YOU EVER DONE ANYTHING, STARTED TO DO ANYTHING, OR PREPARED TO DO ANYTHING TO END YOUR LIFE?: YES
2. HAVE YOU ACTUALLY HAD ANY THOUGHTS OF KILLING YOURSELF?: YES
5. HAVE YOU STARTED TO WORK OUT OR WORKED OUT THE DETAILS OF HOW TO KILL YOURSELF? DO YOU INTEND TO CARRY OUT THIS PLAN?: YES
6. HAVE YOU EVER DONE ANYTHING, STARTED TO DO ANYTHING, OR PREPARED TO DO ANYTHING TO END YOUR LIFE?: NO
4. HAVE YOU HAD THESE THOUGHTS AND HAD SOME INTENTION OF ACTING ON THEM?: YES
1. IN THE PAST MONTH, HAVE YOU WISHED YOU WERE DEAD OR WISHED YOU COULD GO TO SLEEP AND NOT WAKE UP?: YES
6. HAVE YOU EVER DONE ANYTHING, STARTED TO DO ANYTHING, OR PREPARED TO DO ANYTHING TO END YOUR LIFE?: YES
2. HAVE YOU ACTUALLY HAD ANY THOUGHTS OF KILLING YOURSELF?: NO
1. IN THE PAST MONTH, HAVE YOU WISHED YOU WERE DEAD OR WISHED YOU COULD GO TO SLEEP AND NOT WAKE UP?: NO

## 2024-05-17 ASSESSMENT — PAIN - FUNCTIONAL ASSESSMENT: PAIN_FUNCTIONAL_ASSESSMENT: 0-10

## 2024-05-17 ASSESSMENT — PAIN SCALES - GENERAL
PAINLEVEL_OUTOF10: 0 - NO PAIN
PAINLEVEL_OUTOF10: 0 - NO PAIN

## 2024-05-17 ASSESSMENT — LIFESTYLE VARIABLES
SUBSTANCE_ABUSE_PAST_12_MONTHS: YES
PRESCIPTION_ABUSE_PAST_12_MONTHS: NO
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
## 2024-05-18 VITALS
HEIGHT: 71 IN | SYSTOLIC BLOOD PRESSURE: 122 MMHG | HEART RATE: 80 BPM | BODY MASS INDEX: 27.86 KG/M2 | OXYGEN SATURATION: 99 % | DIASTOLIC BLOOD PRESSURE: 85 MMHG | RESPIRATION RATE: 18 BRPM | WEIGHT: 199 LBS | TEMPERATURE: 98.1 F

## 2024-05-18 LAB — GLUCOSE BLD MANUAL STRIP-MCNC: 119 MG/DL (ref 74–99)

## 2024-05-18 PROCEDURE — 2500000002 HC RX 250 W HCPCS SELF ADMINISTERED DRUGS (ALT 637 FOR MEDICARE OP, ALT 636 FOR OP/ED): Mod: SE | Performed by: PHYSICIAN ASSISTANT

## 2024-05-18 PROCEDURE — 2500000001 HC RX 250 WO HCPCS SELF ADMINISTERED DRUGS (ALT 637 FOR MEDICARE OP): Mod: SE | Performed by: PHYSICIAN ASSISTANT

## 2024-05-18 PROCEDURE — 2500000006 HC RX 250 W HCPCS SELF ADMINISTERED DRUGS (ALT 637 FOR ALL PAYERS): Mod: SE | Performed by: PHYSICIAN ASSISTANT

## 2024-05-18 PROCEDURE — S4991 NICOTINE PATCH NONLEGEND: HCPCS | Mod: SE | Performed by: PHYSICIAN ASSISTANT

## 2024-05-18 PROCEDURE — 82947 ASSAY GLUCOSE BLOOD QUANT: CPT

## 2024-05-18 RX ORDER — DIVALPROEX SODIUM 250 MG/1
500 TABLET, FILM COATED, EXTENDED RELEASE ORAL DAILY
Status: DISCONTINUED | OUTPATIENT
Start: 2024-05-18 | End: 2024-05-19 | Stop reason: HOSPADM

## 2024-05-18 RX ORDER — OLANZAPINE 5 MG/1
20 TABLET ORAL NIGHTLY
Status: DISCONTINUED | OUTPATIENT
Start: 2024-05-18 | End: 2024-05-19 | Stop reason: HOSPADM

## 2024-05-18 RX ORDER — AMOXICILLIN AND CLAVULANATE POTASSIUM 875; 125 MG/1; MG/1
875 TABLET, FILM COATED ORAL 2 TIMES DAILY
Qty: 14 TABLET | Refills: 0 | Status: SHIPPED | OUTPATIENT
Start: 2024-05-18 | End: 2024-05-25

## 2024-05-18 RX ORDER — FLUOXETINE HYDROCHLORIDE 20 MG/1
20 CAPSULE ORAL DAILY
Status: DISCONTINUED | OUTPATIENT
Start: 2024-05-18 | End: 2024-05-19 | Stop reason: HOSPADM

## 2024-05-18 RX ORDER — NICOTINE 7MG/24HR
1 PATCH, TRANSDERMAL 24 HOURS TRANSDERMAL DAILY
Status: DISCONTINUED | OUTPATIENT
Start: 2024-05-18 | End: 2024-05-19 | Stop reason: HOSPADM

## 2024-05-18 RX ORDER — AMOXICILLIN AND CLAVULANATE POTASSIUM 875; 125 MG/1; MG/1
1 TABLET, FILM COATED ORAL ONCE
Status: DISCONTINUED | OUTPATIENT
Start: 2024-05-18 | End: 2024-05-18

## 2024-05-18 RX ORDER — LIDOCAINE HYDROCHLORIDE 20 MG/ML
5 SOLUTION OROPHARYNGEAL ONCE
Status: COMPLETED | OUTPATIENT
Start: 2024-05-18 | End: 2024-05-18

## 2024-05-18 RX ORDER — RISPERIDONE 1 MG/1
1 TABLET ORAL DAILY
Status: DISCONTINUED | OUTPATIENT
Start: 2024-05-18 | End: 2024-05-19 | Stop reason: HOSPADM

## 2024-05-18 RX ORDER — TRAZODONE HYDROCHLORIDE 50 MG/1
100 TABLET ORAL NIGHTLY
Status: DISCONTINUED | OUTPATIENT
Start: 2024-05-18 | End: 2024-05-19 | Stop reason: HOSPADM

## 2024-05-18 RX ORDER — BENZTROPINE MESYLATE 1 MG/1
2 TABLET ORAL 2 TIMES DAILY
Status: DISCONTINUED | OUTPATIENT
Start: 2024-05-18 | End: 2024-05-19 | Stop reason: HOSPADM

## 2024-05-18 RX ORDER — ATORVASTATIN CALCIUM 20 MG/1
20 TABLET, FILM COATED ORAL NIGHTLY
Status: DISCONTINUED | OUTPATIENT
Start: 2024-05-18 | End: 2024-05-19 | Stop reason: HOSPADM

## 2024-05-18 RX ORDER — ACETAMINOPHEN 325 MG/1
975 TABLET ORAL ONCE
Status: COMPLETED | OUTPATIENT
Start: 2024-05-18 | End: 2024-05-18

## 2024-05-18 RX ORDER — QUETIAPINE FUMARATE 100 MG/1
100 TABLET, FILM COATED ORAL DAILY
Status: DISCONTINUED | OUTPATIENT
Start: 2024-05-18 | End: 2024-05-19 | Stop reason: HOSPADM

## 2024-05-18 RX ORDER — AMOXICILLIN AND CLAVULANATE POTASSIUM 875; 125 MG/1; MG/1
1 TABLET, FILM COATED ORAL
Status: DISCONTINUED | OUTPATIENT
Start: 2024-05-18 | End: 2024-05-19 | Stop reason: HOSPADM

## 2024-05-18 RX ADMIN — RISPERIDONE 1 MG: 1 TABLET, FILM COATED ORAL at 12:42

## 2024-05-18 RX ADMIN — LIDOCAINE HYDROCHLORIDE 5 ML: 20 SOLUTION ORAL at 18:48

## 2024-05-18 RX ADMIN — NICOTINE 7 MG/24 HR DAILY TRANSDERMAL PATCH 1 PATCH: at 10:47

## 2024-05-18 RX ADMIN — BENZTROPINE MESYLATE 2 MG: 1 TABLET ORAL at 21:51

## 2024-05-18 RX ADMIN — BUSPIRONE HYDROCHLORIDE 15 MG: 10 TABLET ORAL at 21:51

## 2024-05-18 RX ADMIN — ACETAMINOPHEN 975 MG: 325 TABLET ORAL at 12:39

## 2024-05-18 RX ADMIN — AMOXICILLIN AND CLAVULANATE POTASSIUM 1 TABLET: 875; 125 TABLET, FILM COATED ORAL at 12:40

## 2024-05-18 RX ADMIN — OLANZAPINE 20 MG: 5 TABLET, FILM COATED ORAL at 21:51

## 2024-05-18 RX ADMIN — ATORVASTATIN CALCIUM 20 MG: 20 TABLET, FILM COATED ORAL at 21:51

## 2024-05-18 RX ADMIN — TRAZODONE HYDROCHLORIDE 100 MG: 50 TABLET ORAL at 21:51

## 2024-05-18 RX ADMIN — QUETIAPINE FUMARATE 100 MG: 100 TABLET ORAL at 12:43

## 2024-05-18 RX ADMIN — BUSPIRONE HYDROCHLORIDE 15 MG: 10 TABLET ORAL at 12:40

## 2024-05-18 RX ADMIN — FLUOXETINE 20 MG: 20 CAPSULE ORAL at 12:40

## 2024-05-18 RX ADMIN — DIVALPROEX SODIUM 500 MG: 250 TABLET, EXTENDED RELEASE ORAL at 12:40

## 2024-05-18 RX ADMIN — BENZTROPINE MESYLATE 2 MG: 1 TABLET ORAL at 12:40

## 2024-05-18 SDOH — HEALTH STABILITY: MENTAL HEALTH: HALLUCINATION: COMMAND;AUDITORY

## 2024-05-18 SDOH — HEALTH STABILITY: MENTAL HEALTH: BEHAVIORS/MOOD: AGITATED;ANXIOUS

## 2024-05-18 SDOH — HEALTH STABILITY: MENTAL HEALTH: HAVE YOU ACTUALLY HAD ANY THOUGHTS OF KILLING YOURSELF?: YES

## 2024-05-18 SDOH — HEALTH STABILITY: MENTAL HEALTH: SUICIDE ASSESSMENT: ADULT (C-SSRS)

## 2024-05-18 SDOH — HEALTH STABILITY: MENTAL HEALTH: HAVE YOU WISHED YOU WERE DEAD OR WISHED YOU COULD GO TO SLEEP AND NOT WAKE UP?: YES

## 2024-05-18 SDOH — HEALTH STABILITY: MENTAL HEALTH: HAVE YOU BEEN THINKING ABOUT HOW YOU MIGHT DO THIS?: YES

## 2024-05-18 SDOH — HEALTH STABILITY: MENTAL HEALTH: HAVE YOU HAD THESE THOUGHTS AND HAD SOME INTENTION OF ACTING ON THEM?: YES

## 2024-05-18 SDOH — HEALTH STABILITY: MENTAL HEALTH: WAS THIS WITHIN THE PAST THREE MONTHS?: YES

## 2024-05-18 SDOH — HEALTH STABILITY: MENTAL HEALTH: HAVE YOU EVER DONE ANYTHING, STARTED TO DO ANYTHING, OR PREPARED TO DO ANYTHING TO END YOUR LIFE?: YES

## 2024-05-18 SDOH — HEALTH STABILITY: MENTAL HEALTH: BEHAVIORS/MOOD: COOPERATIVE;CALM

## 2024-05-18 ASSESSMENT — PAIN SCALES - GENERAL
PAINLEVEL_OUTOF10: 5 - MODERATE PAIN
PAINLEVEL_OUTOF10: 0 - NO PAIN
PAINLEVEL_OUTOF10: 5 - MODERATE PAIN

## 2024-05-18 ASSESSMENT — PAIN DESCRIPTION - ONSET: ONSET: ONGOING

## 2024-05-18 ASSESSMENT — LIFESTYLE VARIABLES
EVER FELT BAD OR GUILTY ABOUT YOUR DRINKING: NO
HAVE YOU EVER FELT YOU SHOULD CUT DOWN ON YOUR DRINKING: NO
EVER HAD A DRINK FIRST THING IN THE MORNING TO STEADY YOUR NERVES TO GET RID OF A HANGOVER: NO
TOTAL SCORE: 0
HAVE PEOPLE ANNOYED YOU BY CRITICIZING YOUR DRINKING: NO

## 2024-05-18 ASSESSMENT — PAIN DESCRIPTION - DESCRIPTORS
DESCRIPTORS: ACHING
DESCRIPTORS: ACHING

## 2024-05-18 ASSESSMENT — PAIN DESCRIPTION - ORIENTATION: ORIENTATION: RIGHT

## 2024-05-18 ASSESSMENT — PAIN DESCRIPTION - PROGRESSION: CLINICAL_PROGRESSION: GRADUALLY IMPROVING

## 2024-05-18 ASSESSMENT — PAIN DESCRIPTION - PAIN TYPE: TYPE: ACUTE PAIN

## 2024-05-18 ASSESSMENT — PAIN DESCRIPTION - LOCATION: LOCATION: TEETH

## 2024-05-18 ASSESSMENT — PAIN - FUNCTIONAL ASSESSMENT
PAIN_FUNCTIONAL_ASSESSMENT: 0-10
PAIN_FUNCTIONAL_ASSESSMENT: 0-10

## 2024-05-18 ASSESSMENT — PAIN DESCRIPTION - FREQUENCY: FREQUENCY: CONSTANT/CONTINUOUS

## 2024-05-18 NOTE — PROGRESS NOTES
I received Shamar Cunningham in signout from [facility].  Please see the previous note for all HPI, PE and MDM up to the time of signout at 7am.    In brief Shamar Cunningham is an 43 y.o. male presenting for Patient is a 43-year-old male who presented initially to the ED for psychiatric evaluation, history of schizophrenia and PTSD, has had multiple inpatient psychiatric admissions most recent 1 in March, we did speak with the EPAT and they are recommending placement (most recent note by Ct , patient should be going to [facility], is currently awaiting transport.  Patient did request to speak with me, is having some pain and tenderness to tooth #3, there is no obvious drainable abscess, patient was inquiring about a dental x-ray.  I unfortunately do not have the ability to do dental x-rays here however given his new onset dental pain I will treat him with Augmentin as well as pain medication.  He was also provided with a prescription for Augmentin to be sent to outpatient psychiatric facility with.  Chief Complaint   Patient presents with    Psychiatric Evaluation   .  At the time of signout we were awaiting: transport to [facility]    No orders to display     Labs Reviewed   COMPREHENSIVE METABOLIC PANEL - Abnormal       Result Value    Glucose 73 (*)     Sodium 139      Potassium 4.2      Chloride 104      Bicarbonate 29      Anion Gap 10      Urea Nitrogen 8      Creatinine 1.08      eGFR 87      Calcium 9.4      Albumin 4.2      Alkaline Phosphatase 60      Total Protein 7.1      AST 21      Bilirubin, Total 0.4      ALT 11     DRUG SCREEN,URINE - Abnormal    Amphetamine Screen, Urine Presumptive Negative      Barbiturate Screen, Urine Presumptive Negative      Benzodiazepines Screen, Urine Presumptive Negative      Cannabinoid Screen, Urine Presumptive Positive (*)     Cocaine Metabolite Screen, Urine Presumptive Negative      Fentanyl Screen, Urine Presumptive Negative      Opiate Screen, Urine  Presumptive Negative      Oxycodone Screen, Urine Presumptive Negative      PCP Screen, Urine Presumptive Positive (*)     Methadone Screen, Urine Presumptive Negative      Narrative:     Drug screen results are presumptive and should not be used to assess   compliance with prescribed medication. Contact the performing [facility] laboratory   to add-on definitive confirmatory testing if clinically indicated.    Toxicology screening results are reported qualitatively. The concentration must   be greater than or equal to the cutoff to be reported as positive. The concentration   at which the screening test can detect an individual drug or metabolite varies.   The absence of expected drug(s) and/or drug metabolite(s) may indicate non-compliance,   inappropriate timing of specimen collection relative to drug administration, poor drug   absorption, diluted/adulterated urine, or limitations of testing. For medical purposes   only; not valid for forensic use.    Interpretive questions should be directed to the laboratory medical directors.   POCT GLUCOSE - Abnormal    POCT Glucose 119 (*)    ACUTE TOXICOLOGY PANEL, BLOOD - Normal    Acetaminophen <10.0      Salicylate  <3      Alcohol <10     SARS-COV-2 PCR - Normal    Coronavirus 2019, PCR Not Detected      Narrative:     This assay has received FDA Emergency Use Authorization (EUA) and is only authorized for the duration of time that circumstances exist to justify the authorization of the emergency use of in vitro diagnostic tests for the detection of SARS-CoV-2 virus and/or diagnosis of COVID-19 infection under section 564(b)(1) of the Act, 21 U.S.C. 360bbb-3(b)(1). This assay is an in vitro diagnostic nucleic acid amplification test for the qualitative detection of SARS-CoV-2 from nasopharyngeal specimens and has been validated for use at [facility]. Negative results do not preclude COVID-19 infections and should not be used as the sole basis for  diagnosis, treatment, or other management decisions.     CREATINE KINASE - Normal    Creatine Kinase 175     CBC WITH AUTO DIFFERENTIAL    WBC 5.2      nRBC 0.0      RBC 4.77      Hemoglobin 15.0      Hematocrit 42.0      MCV 88      MCH 31.4      MCHC 35.7      RDW 12.6      Platelets 253      Neutrophils % 39.3      Immature Granulocytes %, Automated 0.2      Lymphocytes % 50.4      Monocytes % 8.0      Eosinophils % 1.3      Basophils % 0.8      Neutrophils Absolute 2.06      Immature Granulocytes Absolute, Automated 0.01      Lymphocytes Absolute 2.64      Monocytes Absolute 0.42      Eosinophils Absolute 0.07      Basophils Absolute 0.04       ED Course as of 05/18/24 1603   Sat May 18, 2024   1159     Patient complaining of dental pain, tooth #2 is noted to be tender, no surrounding abscess or drainable fluid collection, will prescribe Augmentin, prescription was placed in patient's folder, will be scheduled twice a day while he is here in the hospital, all home meds ordered.  cogentin 2 mg bid  buspar 15 mg bid  atorvatatin 20 qd  depaote 500 err  fluoxetine 20 mg qd  olanzapine 20 mg qd  quetiapine 100 mg qd  risperdone 1 mg qd  trazodone 100 mg qd [MK]      ED Course User Index  [MK] Margarita Tran PA-C         Diagnoses as of 05/18/24 1603   Pain, dental             Pt Disposition: Transports to [facility] for psychiatric admission

## 2024-05-18 NOTE — SIGNIFICANT EVENT
Application for Emergency Admission      Ready for Transfer?  Is the patient medically cleared for transfer to inpatient psychiatry: Yes  Has the patient been accepted to an inpatient psychiatric hospital: Yes    Application for Emergency Admission  IN ACCORDANCE WITH [place] 5122[place]10 [place]  The Chief Clinical Officer of: [facility] 5/17/2024 .8:16 PM    Reason for Hospitalization  The undersigned has reason to believe that: Shamar Cunningham Is a mentally ill person subject to hospitalization by court order under division B Section 5122.01 of the Revised Code, i.e., this person:    1.Yes  Represents a substantial risk of physical harm to self as manifested by evidence of threats of, or attempts at, suicide or serious self-inflicted bodily harm    2.No Represents a substantial risk of physical harm to others as manifested by evidence of recent homicidal or other violent behavior, evidence of recent threats that place another in reasonable fear of violent behavior and serious physical harm, or other evidence of present dangerousness    3.Yes Represents a substantial and immediate risk of serious physical impairment or injury to self as manifested by  evidence that the person is unable to provide for and is not providing for the person's basic physical needs because of the person's mental illness and that appropriate provision for those needs cannot be made  immediately available in the community    4.Yes Would benefit from treatment in a hospital for his mental illness and is in need of such treatment as manifested by evidence of behavior that creates a grave and imminent risk to substantial rights of others or  himself.    5.Yes Would benefit from treatment as manifested by evidence of behavior that indicates all of the following:       (a) The person is unlikely to survive safely in the community without supervision, based on a clinical determination.       (b) The person has a history of lack of  compliance with treatment for mental illness and one of the following applies:      (i) At least twice within the thirty-six months prior to the filing of an affidavit seeking court-ordered treatment of the person under section 5122.111 of the Revised Code, the lack of compliance has been a significant factor in necessitating hospitalization in a hospital or receipt of services in a forensic or other mental health unit of a correctional facility, provided that the thirty-six-month period shall be extended by the length of any hospitalization or incarceration of the person that occurred within the thirty-six-month period.      (ii) Within the forty-eight months prior to the filing of an affidavit seeking court-ordered treatment of the person under section 5122.111 of the Revised Code, the lack of compliance resulted in one or more acts of serious violent behavior toward self or others or threats of, or attempts at, serious physical harm to self or others, provided that the forty-eight-month period shall be extended by the length of any hospitalization or incarceration of the person that occurred within the forty-eight-month period.      (c) The person, as a result of mental illness, is unlikely to voluntarily participate in necessary treatment.       (d) In view of the person's treatment history and current behavior, the person is in need of treatment in order to prevent a relapse or deterioration that would be likely to result in substantial risk of serious harm to the person or others.    (e) Represents a substantial risk of physical harm to self or others if allowed to remain at liberty pending examination.    Therefore, it is requested that said person be admitted to the above named facility.    STATEMENT OF BELIEF    Must be filled out by one of the following: a psychiatrist, licensed physician, licensed clinical psychologist, health or ,  or .  (Statement shall include the  circumstances under which the individual was taken into custody and the reason for the person's belief that hospitalization is necessary. The statement shall also include a reference to efforts made to secure the individual's property at his residence if he was taken into custody there. Every reasonable and appropriate effort should be made to take this person into custody in the least conspicuous manner possible.)    Mr. Cunningham is a 43-year-old male with a history of schizoaffective disorder who presented to the emergency department this evening with paranoid delusions thinking people are trying to stab him notably his neighbor, as well as increasing depression with suicidal thoughts that are vague with a plan to hang himself.  Mr. Cunningham's delusions are affecting his ability to maintain his activities of daily living as well as increasing suicidal ideation posing a danger to self and others.  As such the patient would benefit from inpatient psychiatric admission for stabilization     Robson Trujillo DO 5/17/2024     _____________________________________________________________   Place of Employment: [facility]    STATEMENT OF OBSERVATION BY PSYCHIATRIST, LICENSED PHYSICIAN, OR LICENSED CLINICAL PSYCHOLOGIST, IF APPLICABLE    Place of Observation (e.g., [facility] center, [facility] hospital, office, emergency facility)  (If applicable, please complete)    Robson Trujillo DO 5/17/2024    _____________________________________________________________

## 2024-06-18 ENCOUNTER — CLINICAL SUPPORT (OUTPATIENT)
Dept: EMERGENCY MEDICINE | Facility: HOSPITAL | Age: 44
End: 2024-06-18
Payer: COMMERCIAL

## 2024-06-18 ENCOUNTER — HOSPITAL ENCOUNTER (EMERGENCY)
Facility: HOSPITAL | Age: 44
Discharge: HOME | End: 2024-06-19
Attending: EMERGENCY MEDICINE
Payer: COMMERCIAL

## 2024-06-18 DIAGNOSIS — F16.20: Primary | ICD-10-CM

## 2024-06-18 DIAGNOSIS — R45.851 SUICIDAL IDEATION: ICD-10-CM

## 2024-06-18 LAB
AMPHETAMINES UR QL SCN: ABNORMAL
ANION GAP SERPL CALC-SCNC: 13 MMOL/L (ref 10–20)
APAP SERPL-MCNC: <10 UG/ML
APPEARANCE UR: CLEAR
ATRIAL RATE: 60 BPM
BARBITURATES UR QL SCN: ABNORMAL
BASOPHILS # BLD AUTO: 0.06 X10*3/UL (ref 0–0.1)
BASOPHILS NFR BLD AUTO: 0.9 %
BENZODIAZ UR QL SCN: ABNORMAL
BILIRUB UR STRIP.AUTO-MCNC: NEGATIVE MG/DL
BUN SERPL-MCNC: 15 MG/DL (ref 6–23)
BZE UR QL SCN: ABNORMAL
CALCIUM SERPL-MCNC: 10.1 MG/DL (ref 8.6–10.6)
CANNABINOIDS UR QL SCN: ABNORMAL
CHLORIDE SERPL-SCNC: 106 MMOL/L (ref 98–107)
CO2 SERPL-SCNC: 26 MMOL/L (ref 21–32)
COLOR UR: NORMAL
CREAT SERPL-MCNC: 0.96 MG/DL (ref 0.5–1.3)
EGFRCR SERPLBLD CKD-EPI 2021: >90 ML/MIN/1.73M*2
EOSINOPHIL # BLD AUTO: 0.09 X10*3/UL (ref 0–0.7)
EOSINOPHIL NFR BLD AUTO: 1.4 %
ERYTHROCYTE [DISTWIDTH] IN BLOOD BY AUTOMATED COUNT: 13.2 % (ref 11.5–14.5)
ETHANOL SERPL-MCNC: <10 MG/DL
FENTANYL+NORFENTANYL UR QL SCN: ABNORMAL
GLUCOSE SERPL-MCNC: 96 MG/DL (ref 74–99)
GLUCOSE UR STRIP.AUTO-MCNC: NORMAL MG/DL
HCT VFR BLD AUTO: 41.9 % (ref 41–52)
HGB BLD-MCNC: 14.7 G/DL (ref 13.5–17.5)
HOLD SPECIMEN: NORMAL
HOLD SPECIMEN: NORMAL
IMM GRANULOCYTES # BLD AUTO: 0.01 X10*3/UL (ref 0–0.7)
IMM GRANULOCYTES NFR BLD AUTO: 0.2 % (ref 0–0.9)
KETONES UR STRIP.AUTO-MCNC: NEGATIVE MG/DL
LEUKOCYTE ESTERASE UR QL STRIP.AUTO: NEGATIVE
LYMPHOCYTES # BLD AUTO: 2.6 X10*3/UL (ref 1.2–4.8)
LYMPHOCYTES NFR BLD AUTO: 39.6 %
MCH RBC QN AUTO: 30.9 PG (ref 26–34)
MCHC RBC AUTO-ENTMCNC: 35.1 G/DL (ref 32–36)
MCV RBC AUTO: 88 FL (ref 80–100)
METHADONE UR QL SCN: ABNORMAL
MONOCYTES # BLD AUTO: 0.51 X10*3/UL (ref 0.1–1)
MONOCYTES NFR BLD AUTO: 7.8 %
NEUTROPHILS # BLD AUTO: 3.3 X10*3/UL (ref 1.2–7.7)
NEUTROPHILS NFR BLD AUTO: 50.1 %
NITRITE UR QL STRIP.AUTO: NEGATIVE
NRBC BLD-RTO: 0 /100 WBCS (ref 0–0)
OPIATES UR QL SCN: ABNORMAL
OXYCODONE+OXYMORPHONE UR QL SCN: ABNORMAL
P AXIS: 59 DEGREES
P OFFSET: 204 MS
P ONSET: 156 MS
PCP UR QL SCN: ABNORMAL
PH UR STRIP.AUTO: 5.5 [PH]
PLATELET # BLD AUTO: 315 X10*3/UL (ref 150–450)
POTASSIUM SERPL-SCNC: 4.2 MMOL/L (ref 3.5–5.3)
PR INTERVAL: 126 MS
PROT UR STRIP.AUTO-MCNC: NEGATIVE MG/DL
Q ONSET: 219 MS
QRS COUNT: 10 BEATS
QRS DURATION: 86 MS
QT INTERVAL: 360 MS
QTC CALCULATION(BAZETT): 360 MS
QTC FREDERICIA: 360 MS
R AXIS: 58 DEGREES
RBC # BLD AUTO: 4.75 X10*6/UL (ref 4.5–5.9)
RBC # UR STRIP.AUTO: NEGATIVE /UL
SALICYLATES SERPL-MCNC: <3 MG/DL
SARS-COV-2 RNA RESP QL NAA+PROBE: NOT DETECTED
SODIUM SERPL-SCNC: 141 MMOL/L (ref 136–145)
SP GR UR STRIP.AUTO: 1.02
T AXIS: -7 DEGREES
T OFFSET: 399 MS
UROBILINOGEN UR STRIP.AUTO-MCNC: NORMAL MG/DL
VENTRICULAR RATE: 60 BPM
WBC # BLD AUTO: 6.6 X10*3/UL (ref 4.4–11.3)

## 2024-06-18 PROCEDURE — 80143 DRUG ASSAY ACETAMINOPHEN: CPT | Performed by: PHYSICIAN ASSISTANT

## 2024-06-18 PROCEDURE — 80320 DRUG SCREEN QUANTALCOHOLS: CPT | Performed by: PHYSICIAN ASSISTANT

## 2024-06-18 PROCEDURE — 99284 EMERGENCY DEPT VISIT MOD MDM: CPT | Performed by: PHYSICIAN ASSISTANT

## 2024-06-18 PROCEDURE — 99284 EMERGENCY DEPT VISIT MOD MDM: CPT

## 2024-06-18 PROCEDURE — 82374 ASSAY BLOOD CARBON DIOXIDE: CPT | Performed by: PHYSICIAN ASSISTANT

## 2024-06-18 PROCEDURE — 85025 COMPLETE CBC W/AUTO DIFF WBC: CPT | Performed by: PHYSICIAN ASSISTANT

## 2024-06-18 PROCEDURE — 87635 SARS-COV-2 COVID-19 AMP PRB: CPT | Performed by: PHYSICIAN ASSISTANT

## 2024-06-18 PROCEDURE — 36415 COLL VENOUS BLD VENIPUNCTURE: CPT | Performed by: PHYSICIAN ASSISTANT

## 2024-06-18 PROCEDURE — 81003 URINALYSIS AUTO W/O SCOPE: CPT | Performed by: PHYSICIAN ASSISTANT

## 2024-06-18 PROCEDURE — 80307 DRUG TEST PRSMV CHEM ANLYZR: CPT | Performed by: PHYSICIAN ASSISTANT

## 2024-06-18 PROCEDURE — 93005 ELECTROCARDIOGRAM TRACING: CPT

## 2024-06-18 SDOH — HEALTH STABILITY: MENTAL HEALTH: ACTIVE SUICIDAL IDEATION WITH SOME INTENT TO ACT, WITHOUT SPECIFIC PLAN (PAST 1 MONTH): NO

## 2024-06-18 SDOH — HEALTH STABILITY: MENTAL HEALTH: ACTIVE SUICIDAL IDEATION WITH SPECIFIC PLAN AND INTENT (PAST 1 MONTH): NO

## 2024-06-18 SDOH — HEALTH STABILITY: MENTAL HEALTH: DEPRESSION SYMPTOMS: FEELINGS OF HELPLESSNESS;INCREASED IRRITABILITY

## 2024-06-18 SDOH — HEALTH STABILITY: MENTAL HEALTH: ARE YOU HAVING THOUGHTS OF KILLING YOURSELF RIGHT NOW?: NO

## 2024-06-18 SDOH — HEALTH STABILITY: MENTAL HEALTH

## 2024-06-18 SDOH — ECONOMIC STABILITY: HOUSING INSECURITY: FEELS SAFE LIVING IN HOME: YES

## 2024-06-18 SDOH — HEALTH STABILITY: MENTAL HEALTH: SUICIDAL BEHAVIOR (DESCRIPTION): CUT ARM AND CUT NECK SUPERFICIALLY

## 2024-06-18 SDOH — HEALTH STABILITY: MENTAL HEALTH: IN THE PAST FEW WEEKS, HAVE YOU WISHED YOU WERE DEAD?: YES

## 2024-06-18 SDOH — HEALTH STABILITY: MENTAL HEALTH: SUICIDAL BEHAVIOR (LIFETIME): YES

## 2024-06-18 SDOH — HEALTH STABILITY: MENTAL HEALTH: IN THE PAST FEW WEEKS, HAVE YOU FELT THAT YOU OR YOUR FAMILY WOULD BE BETTER OFF IF YOU WERE DEAD?: NO

## 2024-06-18 SDOH — HEALTH STABILITY: MENTAL HEALTH: HALLUCINATION: AUDITORY;COMMAND

## 2024-06-18 SDOH — HEALTH STABILITY: MENTAL HEALTH: WISH TO BE DEAD (PAST 1 MONTH): YES

## 2024-06-18 SDOH — HEALTH STABILITY: MENTAL HEALTH: DELUSIONS: PARANOID

## 2024-06-18 SDOH — HEALTH STABILITY: MENTAL HEALTH: NON-SPECIFIC ACTIVE SUICIDAL THOUGHTS (PAST 1 MONTH): NO

## 2024-06-18 SDOH — HEALTH STABILITY: MENTAL HEALTH: ANXIETY SYMPTOMS: NO PROBLEMS REPORTED OR OBSERVED.

## 2024-06-18 SDOH — SOCIAL STABILITY: SOCIAL NETWORK: PARENT/GUARDIAN/SIGNIFICANT OTHER INVOLVEMENT: NO INVOLVEMENT

## 2024-06-18 SDOH — HEALTH STABILITY: MENTAL HEALTH: BEHAVIORS/MOOD: AGITATED;CALM;COOPERATIVE;CRYING;HALLUCINATIONS;TEARFUL;VERBAL

## 2024-06-18 SDOH — HEALTH STABILITY: MENTAL HEALTH: SUICIDAL BEHAVIOR (3 MONTHS): NO

## 2024-06-18 SDOH — HEALTH STABILITY: MENTAL HEALTH: IN THE PAST WEEK, HAVE YOU BEEN HAVING THOUGHTS ABOUT KILLING YOURSELF?: YES

## 2024-06-18 SDOH — HEALTH STABILITY: MENTAL HEALTH: CONTENT: DELUSIONS

## 2024-06-18 ASSESSMENT — COLUMBIA-SUICIDE SEVERITY RATING SCALE - C-SSRS
2. HAVE YOU ACTUALLY HAD ANY THOUGHTS OF KILLING YOURSELF?: YES
5. HAVE YOU STARTED TO WORK OUT OR WORKED OUT THE DETAILS OF HOW TO KILL YOURSELF? DO YOU INTEND TO CARRY OUT THIS PLAN?: YES
6. HAVE YOU EVER DONE ANYTHING, STARTED TO DO ANYTHING, OR PREPARED TO DO ANYTHING TO END YOUR LIFE?: YES
6. HAVE YOU EVER DONE ANYTHING, STARTED TO DO ANYTHING, OR PREPARED TO DO ANYTHING TO END YOUR LIFE?: YES
4. HAVE YOU HAD THESE THOUGHTS AND HAD SOME INTENTION OF ACTING ON THEM?: YES
1. IN THE PAST MONTH, HAVE YOU WISHED YOU WERE DEAD OR WISHED YOU COULD GO TO SLEEP AND NOT WAKE UP?: YES

## 2024-06-18 ASSESSMENT — LIFESTYLE VARIABLES
EVER FELT BAD OR GUILTY ABOUT YOUR DRINKING: NO
PRESCIPTION_ABUSE_PAST_12_MONTHS: NO
TOTAL SCORE: 0
SUBSTANCE_ABUSE_PAST_12_MONTHS: YES
HAVE PEOPLE ANNOYED YOU BY CRITICIZING YOUR DRINKING: NO
HAVE YOU EVER FELT YOU SHOULD CUT DOWN ON YOUR DRINKING: NO
EVER HAD A DRINK FIRST THING IN THE MORNING TO STEADY YOUR NERVES TO GET RID OF A HANGOVER: NO

## 2024-06-18 ASSESSMENT — PAIN SCALES - GENERAL: PAINLEVEL_OUTOF10: 5 - MODERATE PAIN

## 2024-06-18 ASSESSMENT — PAIN DESCRIPTION - LOCATION: LOCATION: BACK

## 2024-06-18 ASSESSMENT — PAIN - FUNCTIONAL ASSESSMENT: PAIN_FUNCTIONAL_ASSESSMENT: 0-10
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
## 2024-06-18 NOTE — Clinical Note
Shamar Cunningham was seen and treated in our emergency department on 6/18/2024.  He may return to work on 06/19/2024.       If you have any questions or concerns, please don't hesitate to call.      Arnoldo Alvarado MD MPH

## 2024-06-19 VITALS
RESPIRATION RATE: 18 BRPM | BODY MASS INDEX: 25.2 KG/M2 | WEIGHT: 180 LBS | DIASTOLIC BLOOD PRESSURE: 93 MMHG | HEART RATE: 80 BPM | SYSTOLIC BLOOD PRESSURE: 147 MMHG | HEIGHT: 71 IN | OXYGEN SATURATION: 98 % | TEMPERATURE: 97.9 F

## 2024-06-19 RX ORDER — ACETAMINOPHEN 325 MG/1
975 TABLET ORAL ONCE
Status: COMPLETED | OUTPATIENT
Start: 2024-06-19 | End: 2024-06-19

## 2024-06-19 RX ORDER — ACETAMINOPHEN 325 MG/1
TABLET ORAL
Status: COMPLETED
Start: 2024-06-19 | End: 2024-06-19

## 2024-06-19 NOTE — PROGRESS NOTES
I received Shamar Cunningham in signout from [facility].  Please see the previous note for all HPI, PE and MDM up to the time of signout at 2300.    In brief Shamar Cunningham is an 43 y.o. male presenting for   Chief Complaint   Patient presents with    Psychiatric Evaluation   .  At the time of signout we were awaiting: EPASTALIN recs    Patient is a 43-year-old male with a past medical history of schizophrenia, depression, polysubstance use disorder presenting to the emergency department with suicidal ideation.  See previous provider note for further details initial presentation.  I did review the patient's blood work that showed no evidence of ingestion of acetaminophen, salicylates, alcohol, evidence of gross metabolic derangement, clinically significant electrode abnormalities, anemia, or evidence of acute infection.  Patient did test positive for cannabis and PCP, but does not appear clinically intoxicated.  Is hemodynamically stable without evidence of sympathomimetic toxidrome, is resting comfortably and snoring on my initial evaluation, but easily arousable, alert and oriented x 3.  EPAT assessed the patient and stated that this presentation was similar to his multiple previous presentations, he appears near his psychiatric baseline, and was denying continued suicidal ideation to them.  He did state that he was having ongoing PCP use and after EPAT extensive discussed with him, he did agree to [facility] consultation and placement in a detox center.  [facility] detox facility to manage his substance use disorder.    Procedures       Nursing notes reviewed and accepted.    Shelly Hendricks is a 16 year old female who presents for well-child exam. Patient presents with Mother.    Concerns raised today include: acne and sleep.  Currently using clindamycin solution and Retin-A gel for the acne.  It is definitely help but she thinks having to wear a mask frequently for the COVID-19 pandemic as cause some flare up on her face that is not controlled.  Had some mild irritation with the Retin-A initially but that resolved fairly quickly.  She has a hard time getting to sleep and staying asleep especially if she has any sort of anxiety or anticipation about what she has to do the next day in school such as if she has a test.  Beyond that she does not feel overtly anxious, does not really affect her to any great deal of through the day, at least not to the point that she feels she needs to be treated specifically for anxiety.  She tried melatonin up to 5 mg daily for several weeks but has not felt that it really helps much.    Social History:   School: [employer]/11th grade  Interests Activities: School  Future Plans: College  Tobacco: no  ETOH: no  Illicit drugs or Homeopathic medicines: no  Sexually Active: no  Family History: Negative for athletic cardiac events        Physical Examination:  Blood pressure 111/70, pulse 74, temperature 98 °F (36.7 °C), resp. rate 16, height 5' 6\" (1.676 m), weight 84.5 kg, last menstrual period 09/23/2020.  General: Well-appearing female, no acute distress.  Dermatologic:  Mild-to-moderate papulopustular acne involving the face, primarily the T zone.  No large nodule cysts or scarring.  HEENT: Pupils equal, round, and reactive to light. Extraocular movements are intact. No conjunctival injection. No scleral icterus. Tympanic membranes with normal landmarks bilaterally. Oropharynx with moist mucous membranes, clear.  Neck: Supple.  Nodes: No adenopathy.   Lungs: Clear to auscultation. No wheezes, rales, or  rhonchi. Normal work of breathing.  Cardiac: Regular rate and rhythm, normal S1 and S2, no murmur appreciated.  Abdomen: Soft, nontender, nondistended. Normoactive bowel sounds. No organomegaly or masses.  Extremities: Full range of motion upper extremities/lower extremities. Warm, well perfused. No clubbing/cyanosis/edema.  Back: No scoliosis.  Neurologic: Grossly intact. Strength 5/5 bilaterally. Normal tone. Gait normal.    ASSESSMENT: Well 16 year old female adolescent.  Facial acne.  Insomnia.    BEHAVIOR/GUIDANCE:      Puberty/Menstruation - DISCUSSED      Self-breast examination - DISCUSSED      Accident prevention - DISCUSSED      School achievement - DISCUSSED      Family/Peer relationships - DISCUSSED      Regular physical activity - DISCUSSED      Healthy food choices - DISCUSSED      Tobacco, ETOH, drug avoidance - DISCUSSED      Sexual activity & avoidance/safe sex - DISCUSSED    PLAN:  Reinforced sleep hygiene which she has already done a good job with.  Suggested she may want to try magnesium glycinate 400-1000 mg q.h.s..  At this point if the melatonin is not helping she can discontinue that.  Did also prescribe hydroxyzine to use just as needed 10 mg q.h.s. p.r.n. with drowsy precautions.  Call with any updates or concerns.  Discussed all this with her mother as well.  For the acne will try 1 month of oral doxycycline, she can hold the clindamycin solution during that time and then restart when she runs out of the doxycycline.  Discussed potential risks and side effects.  Will step up the Retin-A strength as ordered.  Call with any update as needed.  Anticipatory guidance sheet.   Immunizations per orders.  Return to clinic in 1 year for well-child examination or sooner prn illness/concerns.
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
## 2024-07-08 ENCOUNTER — PHARMACY VISIT (OUTPATIENT)
Dept: PHARMACY | Facility: CLINIC | Age: 44
End: 2024-07-08
Payer: MEDICAID

## 2024-07-08 ENCOUNTER — HOSPITAL ENCOUNTER (EMERGENCY)
Facility: HOSPITAL | Age: 44
Discharge: HOME | End: 2024-07-08
Attending: EMERGENCY MEDICINE
Payer: COMMERCIAL

## 2024-07-08 ENCOUNTER — CLINICAL SUPPORT (OUTPATIENT)
Dept: EMERGENCY MEDICINE | Facility: HOSPITAL | Age: 44
End: 2024-07-08
Payer: COMMERCIAL

## 2024-07-08 VITALS
RESPIRATION RATE: 17 BRPM | OXYGEN SATURATION: 98 % | DIASTOLIC BLOOD PRESSURE: 90 MMHG | TEMPERATURE: 98.2 F | HEIGHT: 71 IN | BODY MASS INDEX: 25.2 KG/M2 | SYSTOLIC BLOOD PRESSURE: 131 MMHG | WEIGHT: 180 LBS | HEART RATE: 66 BPM

## 2024-07-08 DIAGNOSIS — Z76.89 ENCOUNTER FOR PSYCHIATRIC ASSESSMENT: Primary | ICD-10-CM

## 2024-07-08 LAB
ALBUMIN SERPL BCP-MCNC: 4.3 G/DL (ref 3.4–5)
ALP SERPL-CCNC: 72 U/L (ref 33–120)
ALT SERPL W P-5'-P-CCNC: 22 U/L (ref 10–52)
AMPHETAMINES UR QL SCN: ABNORMAL
ANION GAP SERPL CALC-SCNC: 12 MMOL/L (ref 10–20)
APAP SERPL-MCNC: <10 UG/ML
AST SERPL W P-5'-P-CCNC: 27 U/L (ref 9–39)
ATRIAL RATE: 57 BPM
BARBITURATES UR QL SCN: ABNORMAL
BASOPHILS # BLD AUTO: 0.03 X10*3/UL (ref 0–0.1)
BASOPHILS NFR BLD AUTO: 0.5 %
BENZODIAZ UR QL SCN: ABNORMAL
BILIRUB SERPL-MCNC: 0.6 MG/DL (ref 0–1.2)
BUN SERPL-MCNC: 11 MG/DL (ref 6–23)
BZE UR QL SCN: ABNORMAL
CALCIUM SERPL-MCNC: 9.5 MG/DL (ref 8.6–10.6)
CANNABINOIDS UR QL SCN: ABNORMAL
CHLORIDE SERPL-SCNC: 104 MMOL/L (ref 98–107)
CO2 SERPL-SCNC: 26 MMOL/L (ref 21–32)
CREAT SERPL-MCNC: 1.12 MG/DL (ref 0.5–1.3)
EGFRCR SERPLBLD CKD-EPI 2021: 84 ML/MIN/1.73M*2
EOSINOPHIL # BLD AUTO: 0.15 X10*3/UL (ref 0–0.7)
EOSINOPHIL NFR BLD AUTO: 2.4 %
ERYTHROCYTE [DISTWIDTH] IN BLOOD BY AUTOMATED COUNT: 13.4 % (ref 11.5–14.5)
ETHANOL SERPL-MCNC: <10 MG/DL
FENTANYL+NORFENTANYL UR QL SCN: ABNORMAL
GLUCOSE SERPL-MCNC: 106 MG/DL (ref 74–99)
HCT VFR BLD AUTO: 44 % (ref 41–52)
HGB BLD-MCNC: 15.7 G/DL (ref 13.5–17.5)
HOLD SPECIMEN: NORMAL
IMM GRANULOCYTES # BLD AUTO: 0.02 X10*3/UL (ref 0–0.7)
IMM GRANULOCYTES NFR BLD AUTO: 0.3 % (ref 0–0.9)
LYMPHOCYTES # BLD AUTO: 2.39 X10*3/UL (ref 1.2–4.8)
LYMPHOCYTES NFR BLD AUTO: 38 %
MCH RBC QN AUTO: 31.7 PG (ref 26–34)
MCHC RBC AUTO-ENTMCNC: 35.7 G/DL (ref 32–36)
MCV RBC AUTO: 89 FL (ref 80–100)
METHADONE UR QL SCN: ABNORMAL
MONOCYTES # BLD AUTO: 0.64 X10*3/UL (ref 0.1–1)
MONOCYTES NFR BLD AUTO: 10.2 %
NEUTROPHILS # BLD AUTO: 3.06 X10*3/UL (ref 1.2–7.7)
NEUTROPHILS NFR BLD AUTO: 48.6 %
NRBC BLD-RTO: 0 /100 WBCS (ref 0–0)
OPIATES UR QL SCN: ABNORMAL
OXYCODONE+OXYMORPHONE UR QL SCN: ABNORMAL
P AXIS: 61 DEGREES
P OFFSET: 200 MS
P ONSET: 147 MS
PCP UR QL SCN: ABNORMAL
PLATELET # BLD AUTO: 271 X10*3/UL (ref 150–450)
POTASSIUM SERPL-SCNC: 4.1 MMOL/L (ref 3.5–5.3)
PR INTERVAL: 142 MS
PROT SERPL-MCNC: 7.6 G/DL (ref 6.4–8.2)
Q ONSET: 218 MS
QRS COUNT: 9 BEATS
QRS DURATION: 88 MS
QT INTERVAL: 372 MS
QTC CALCULATION(BAZETT): 362 MS
QTC FREDERICIA: 365 MS
R AXIS: 53 DEGREES
RBC # BLD AUTO: 4.96 X10*6/UL (ref 4.5–5.9)
SALICYLATES SERPL-MCNC: <3 MG/DL
SODIUM SERPL-SCNC: 138 MMOL/L (ref 136–145)
T AXIS: 28 DEGREES
T OFFSET: 404 MS
VENTRICULAR RATE: 57 BPM
WBC # BLD AUTO: 6.3 X10*3/UL (ref 4.4–11.3)

## 2024-07-08 PROCEDURE — 2500000001 HC RX 250 WO HCPCS SELF ADMINISTERED DRUGS (ALT 637 FOR MEDICARE OP): Mod: SE

## 2024-07-08 PROCEDURE — 93010 ELECTROCARDIOGRAM REPORT: CPT | Performed by: EMERGENCY MEDICINE

## 2024-07-08 PROCEDURE — RXMED WILLOW AMBULATORY MEDICATION CHARGE

## 2024-07-08 PROCEDURE — 80307 DRUG TEST PRSMV CHEM ANLYZR: CPT

## 2024-07-08 PROCEDURE — 93005 ELECTROCARDIOGRAM TRACING: CPT

## 2024-07-08 PROCEDURE — 99285 EMERGENCY DEPT VISIT HI MDM: CPT | Performed by: EMERGENCY MEDICINE

## 2024-07-08 PROCEDURE — 85025 COMPLETE CBC W/AUTO DIFF WBC: CPT

## 2024-07-08 PROCEDURE — 36415 COLL VENOUS BLD VENIPUNCTURE: CPT

## 2024-07-08 PROCEDURE — 80053 COMPREHEN METABOLIC PANEL: CPT

## 2024-07-08 PROCEDURE — 80320 DRUG SCREEN QUANTALCOHOLS: CPT

## 2024-07-08 RX ORDER — OLANZAPINE 20 MG/1
20 TABLET, ORALLY DISINTEGRATING ORAL NIGHTLY
Qty: 30 TABLET | Refills: 0 | Status: SHIPPED | OUTPATIENT
Start: 2024-07-08 | End: 2024-08-07

## 2024-07-08 RX ORDER — LORAZEPAM 0.5 MG/1
0.5 TABLET ORAL ONCE
Status: COMPLETED | OUTPATIENT
Start: 2024-07-08 | End: 2024-07-08

## 2024-07-08 SDOH — HEALTH STABILITY: MENTAL HEALTH: NON-SPECIFIC ACTIVE SUICIDAL THOUGHTS (PAST 1 MONTH): NO

## 2024-07-08 SDOH — HEALTH STABILITY: MENTAL HEALTH: ACTIVE SUICIDAL IDEATION WITH SOME INTENT TO ACT, WITHOUT SPECIFIC PLAN (PAST 1 MONTH): NO

## 2024-07-08 SDOH — HEALTH STABILITY: MENTAL HEALTH: WISH TO BE DEAD (PAST 1 MONTH): NO

## 2024-07-08 SDOH — HEALTH STABILITY: MENTAL HEALTH: ANXIETY SYMPTOMS: NO PROBLEMS REPORTED OR OBSERVED.

## 2024-07-08 SDOH — HEALTH STABILITY: MENTAL HEALTH: SUICIDAL BEHAVIOR (3 MONTHS): NO

## 2024-07-08 SDOH — HEALTH STABILITY: MENTAL HEALTH: ACTIVE SUICIDAL IDEATION WITH SPECIFIC PLAN AND INTENT (PAST 1 MONTH): NO

## 2024-07-08 SDOH — HEALTH STABILITY: MENTAL HEALTH: SUICIDAL BEHAVIOR (LIFETIME): YES

## 2024-07-08 SDOH — HEALTH STABILITY: MENTAL HEALTH: DEPRESSION SYMPTOMS: NO PROBLEMS REPORTED OR OBSERVED.

## 2024-07-08 ASSESSMENT — COLUMBIA-SUICIDE SEVERITY RATING SCALE - C-SSRS
1. IN THE PAST MONTH, HAVE YOU WISHED YOU WERE DEAD OR WISHED YOU COULD GO TO SLEEP AND NOT WAKE UP?: YES
5. HAVE YOU STARTED TO WORK OUT OR WORKED OUT THE DETAILS OF HOW TO KILL YOURSELF? DO YOU INTEND TO CARRY OUT THIS PLAN?: YES
4. HAVE YOU HAD THESE THOUGHTS AND HAD SOME INTENTION OF ACTING ON THEM?: YES
6. HAVE YOU EVER DONE ANYTHING, STARTED TO DO ANYTHING, OR PREPARED TO DO ANYTHING TO END YOUR LIFE?: YES
2. HAVE YOU ACTUALLY HAD ANY THOUGHTS OF KILLING YOURSELF?: YES
6. HAVE YOU EVER DONE ANYTHING, STARTED TO DO ANYTHING, OR PREPARED TO DO ANYTHING TO END YOUR LIFE?: YES

## 2024-07-08 ASSESSMENT — LIFESTYLE VARIABLES
HAVE YOU EVER FELT YOU SHOULD CUT DOWN ON YOUR DRINKING: NO
EVER HAD A DRINK FIRST THING IN THE MORNING TO STEADY YOUR NERVES TO GET RID OF A HANGOVER: NO
SUBSTANCE_ABUSE_PAST_12_MONTHS: YES
PRESCIPTION_ABUSE_PAST_12_MONTHS: NO
EVER FELT BAD OR GUILTY ABOUT YOUR DRINKING: NO
HAVE PEOPLE ANNOYED YOU BY CRITICIZING YOUR DRINKING: NO
TOTAL SCORE: 0

## 2024-07-08 ASSESSMENT — PAIN - FUNCTIONAL ASSESSMENT: PAIN_FUNCTIONAL_ASSESSMENT: 0-10

## 2024-07-08 ASSESSMENT — PAIN SCALES - GENERAL: PAINLEVEL_OUTOF10: 0 - NO PAIN
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
## 2024-07-08 NOTE — PROGRESS NOTES
EPAT - Social Work Psychiatric Assessment    Arrival Details  Mode of Arrival: Ambulance  Admission Source: Home (Community)  Admission Type: Voluntary  EPAT Assessment Start Date: 07/08/24  EPAT Assessment Start Time: 1245  Name of : GLENNY Voss LSW    History of Present Illness  Admission Reason: Psychiatric evaluation  HPI:     Patient is a 44yo male presenting to the ED via EMS with chief complaint of suicidal ideation. Reportedly, “pt endorses suicidal ideation with a plan to act. Pt states that he wants to step in front of traffic and end his own life. Pt states that this has been happening x2 weeks. Pt states that someone took his medication”. Patient's extensive chart history, triage, and provider note reviewed prior to assessment. Patient has a psychiatric history of Schizophrenia, PSUD, and BPD. He has numerous inpatient admissions, most recently to [facility] last month. [facility]. He has a reported history of suicide attempts, denies recent, indicated high risk at triage. BAL negative, UTOX not available but pt reports recent PCP use.     Patient is very familiar with current services, this ED, and surrounding community emergency services due to frequent visits usually with the exact same presentation. The patient demonstrates a low threshold for stress and will typically present in the context of poor coping mechanisms and concerns about housing. Patient expresses frustration and paranoia surrounding his group home/neighborhood and will express wanting to live in [place] instead. The patient is performative and is known to “loudly sob” during interviews with no evidence of actual tears. He is on a community care plan with [facility] due to his overutilization of their emergency room for secondary gain of shelter.     SW Readmission Information   Readmission within 30 Days: Yes  Previous ED Visit Date and Reason : this ED 6/18/24 for same presentation  Previous  Discharge Date and Location: same day d/c  Factors Contributing to  Readmission Inpatient/ED (Team Perspective):  (see HPI)  Readmission Factors Team Comments: low threshold for stress, lack of coping skills    Psychiatric Symptoms  Anxiety Symptoms: No problems reported or observed.  Depression Symptoms: No problems reported or observed.  Rabia Symptoms: No problems reported or observed.    Psychosis Symptoms  Hallucination Type: No problems reported or observed.  Delusion Type: No problems reported or observed.    Additional Symptoms - Adult  Generalized Anxiety Disorder: No problems reported or observed.  Obsessive Compulsive Disorder: No problems reported or observed.  Panic Attack: No problems reported or observed.  Post Traumatic Stress Disorder: No problems reported or observed.  Delirium: No problems reported or observed.    Past Psychiatric History/Meds/Treatments  Past Psychiatric History: Psychiatric Diagnosis: Psychosis, Mood D/O, Polysubstance Use, Borderline Intellectual functioning // Current  Center: [facility] // Previous Admissions: numerous, most recently [facility] 6/2024  Past Psychiatric Meds/Treatments: see med list  Past Violence/Victimization History: Denies; has remained calm, cooperative, and pleasant    Current Mental Health Contacts   Name/Phone Number: [facility]   Last Appointment Date: unknown  Provider Name/Phone Number: [facility]  Provider Last Appointment Date: unknown    Support System: Community, Friends    Living Arrangement: House, Lives with someone         Income Information  Employment Status for: Patient  Employment Status: Disabled  Income Source: Disability    [employer] Service/Education History  Current or Previous  Service: None  Education Level:  (did not assess)    Social/Cultural History  Social History: 42yo AA male  Cultural Requests During Hospitalization: none  Spiritual Requests During Hospitalization: none  Important Activities:  Social, Hobbies    Legal  Criminal Activity/ Legal Involvement Pertinent to Current Situation/ Hospitalization: None    Drug Screening  Have you used any substances (canabis, cocaine, heroin, hallucinogens, inhalants, etc.) in the past 12 months?: Yes  Have you used any prescription drugs other than prescribed in the past 12 months?: No  Is a toxicology screen needed?: Yes    Stage of Change  Stage of Change: Action  History of Treatment: Inpatient, Dual, Sober living  Type of Treatment Offered:  ([facility])  Treatment Offered: Accepted  Duration of Substance Use: unknown  Frequency of Substance Use: when able to  Age of First Substance Use: unknown    Psychosocial  Psychosocial (WDL): Within Defined Limits    Orientation  Orientation Level: Oriented X4    General Appearance  Motor Activity: Unremarkable  Speech Pattern:  (Unremarkable)  General Attitude: Cooperative, Pleasant  Appearance/Hygiene: Unremarkable    Thought Process  Coherency:  (Unremarkable)  Content: Unremarkable  Delusions:  (None)  Perception: Not altered  Hallucination: None  Judgment/Insight:  (Fair)  Confusion: None  Cognition: Appropriate judgement, Appropriate safety awareness, Appropriate attention/concentration    Sleep Pattern  Sleep Pattern: Sleeps all night    Risk Factors  Self Harm/Suicidal Ideation Plan: denies  Previous Self Harm/Suicidal Plans: per chart, hx of cutting, OD, hanging. Pt denies at current visit  Risk Factors: Male, Major mental illness, Substance abuse    Violence Risk Assessment  Assessment of Violence: None noted  Thoughts of Harm to Others: No    Ability to Assess Risk Screen  Risk Screen - Ability to Assess: Able to be screened  [place] Suicide Severity Rating Scale (Screener/Recent Self-Report)  1. Wish to be Dead (Past 1 Month): No  2. Non-Specific Active Suicidal Thoughts (Past 1 Month): No  3. Active Suicidal Ideation with any Methods (Not Plan) Without Intent to Act (Past 1 Month): No  4. Active Suicidal  Ideation with Some Intent to Act, Without Specific Plan (Past 1 Month): No  5. Active Suicidal Ideation with Specific Plan and Intent (Past 1 Month): No  6. Suicidal Behavior (Lifetime): Yes  6. Suicidal Behavior (3 Months): No  Calculated C-SSRS Risk Score (Lifetime/Recent): Moderate Risk  Step 1: Risk Factors  Current & Past Psychiatric Dx: Mood disorder, Psychotic disorder, Alcohol/substance abuse disorders  Precipitants/Stressors: Triggering events leading to humiliation, shame, and/or despair (e.g. loss of relationship, financial or health status) (real or anticipated)  Access to Lethal Methods : No  Step 2: Protective Factors   Protective Factors Internal: Fear of death or the actual act of killing self, Identifies reasons for living  Protective Factors External: Cultural, spiritual and/or moral attitudes against suicide, Supportive social network or family or friends  Step 3: Suicidal Ideation Intensity  How Many Times Have You Had These Thoughts: Less than once a week  When You Have the Thoughts How Long do They Last : Fleeting - few seconds or minutes  Could/Can You Stop Thinking About Killing Yourself or Wanting to Die if You Want to: Does not attempt to control thoughts  Are There Things - Anyone or Anything - That Stopped You From Wanting to Die or Acting on: Does not apply  What Sort of Reasons Did You Have For Thinking About Wanting to Die or Killing Yourself: Does not apply  Total Score: 2  Step 5: Documentation  Risk Level: Moderate suicide risk (Patient chronically elevated risk due to lifetime hx and static factors. Currently presenting as a low acute risk to self. Discussed with Dr. Taylor)    Psychiatric Impression and Plan of Care  Assessment and Plan:     On initial approach, patient was observed cheerfully having a conversation with the sitter present for another patient. After introductions, patient immediately began “crying”, loudly making noises as if he was crying but no tears were  "present. He began listing his numerous complaints as stated in his triage note, however, then quickly pivoted to discussing his desire to move to [place]. Patient made it clear that he was only suicidal “if I have to go back to my group home” but otherwise “I'm super safe”. The patient immediately demonstrated euthymic mood and congruent affect, smiling and laughing with this writer regarding his plans desires to “get a change of scenery, meet new people”. Patient excitedly endorsed [facility], reporting they were able to send him to a tx facility in [place] last time he engaged with them and that he would like to attempt placement there again. The patient reports his last PCP use was last night. He denied all other psychiatric complaints at this time. No symptoms of acute depression, psychosis, or zeyad were elicited. Patient remained future/goal-oriented, help-seeking, and resourceful. No further needs reported.     Case discussed with [facility].     Diagnostic Impression: Substance Abuse    Psychiatric Impression and Plan for Care: Patient presenting at his psychiatric baseline, no acute decompensation noted. Recommendation for discharge to [facility] discussed with Dr. Taylor who is in agreement.     Specific Resources Provided to Patient: [facility]icharge to [facility]    Outcome/Disposition  Patient's Perception of Outcome Achieved: \"thank you, my savior!\"  Assessment, Recommendations and Risk Level Reviewed with: Dr. Taylor  EPAT Assessment Completed Date: 07/08/24  EPAT Assessment Completed Time: 1628  Patient Disposition: Home      "

## 2024-07-08 NOTE — PROGRESS NOTES
Handoff Note    I received Shamar Cunningham in signout from Dr. Taylor.  Please see the previous note for all HPI, PE and MDM up to the time of signout at 1500.    In brief Shamar Cunningham is an 43 y.o. male presenting for   Chief Complaint   Patient presents with    Suicidal         At the time of signout, the patient's disposition is pending prescription delivery for meds to beds.  This is a 43-year-old male who presents with suicidal ideation.  EPAT had evaluated and patient requested [facility] services as this was likely secondary to intoxication.  [facility] has evaluated and is able to place the patient.  He does require medications that will come from meds to beds.  [facility] was able to coordinate the meds to beds delivery and the patient is appropriate for discharge.  Return precautions are provided and patient is discharged with arrived to inpatient rehabilitation services.      Throughout the ED stay, the patient was monitored and re-examined for any changes in stability or symptomatology.  The patient was instructed to return to the ED if any symptoms recurred, worsened, or if there was any additional concerns.    ED Course:   ED Course as of 07/08/24 1730 Mon Jul 08, 2024   1141 CBC, CMP, tox panel wnl. [SA]   1141 Patient is medically clear [SA]   1144 EKG reviewed normal sinus rhythm rate 57, NV interval 142 ms, QRS 88 ms, QTc 362 ms, no acute ST segment elevations or depressions [SA]   1257 EPAT evaluated patient and recommend discharge, patient would like to talk to [facility]. [facility] notified [SA]   1355 Patient's Zyprexa refilled to [facility] [SA]      ED Course User Index  [SA] Ivone Taylor DO         Diagnoses as of 07/08/24 1730   Encounter for psychiatric assessment         Patient seen by and discussed with attending emergency medicine physician, Dr. Richardson    Pt Disposition: Discharge    Procedures      David Villareal DO  Emergency Medicine PGY-3  [facility]

## 2024-07-08 NOTE — DISCHARGE INSTRUCTIONS
Follow-up with your primary care doctor as needed.  If you do not have a primary care doctor you may call [phone] to make an appointment.

## 2024-07-08 NOTE — ED PROVIDER NOTES
CC: Suicidal     History provided by: Patient  Limitations to History: None    HPI:  Patient is a 43-year-old male with history of schizophrenia, depression, polysubstance use disorder who presents to the ED with suicidal ideation.  Patient has been seen in the ED multiple times with similar complaint.  Today, he states he wants to kill himself by cutting his throat or walking in front of traffic.  He states the people he lives with makes him want to kill himself.  He denies any recent trauma or assault.  He is ambulatory with steady gait in the ED.  He states he is people stole his psych medications therefore he has not been taking them.  He states he would like to go to [facility].  States he is also hearing and seeing things but will not elaborate.  He endorses marijuana use earlier tonight but denies any other drug use.  He denies any recent falls.    External Records Reviewed:  I reviewed prior ED visits, Care Everywhere, discharge summaries and outpatient records as appropriate.   ???????????????????????????????????????????????????????????????  Triage Vitals:  T 36.8 °C (98.2 °F)  HR 66  /90  RR 17  O2 98 % None (Room air)    Physical Exam  Vitals and nursing note reviewed.   Constitutional:       General: He is not in acute distress.     Appearance: Normal appearance.   HENT:      Head: Normocephalic and atraumatic.   Eyes:      Conjunctiva/sclera: Conjunctivae normal.   Cardiovascular:      Rate and Rhythm: Normal rate and regular rhythm.   Pulmonary:      Effort: Pulmonary effort is normal. No respiratory distress.   Abdominal:      General: Abdomen is flat.      Palpations: Abdomen is soft.   Musculoskeletal:         General: Normal range of motion.      Cervical back: Normal range of motion and neck supple.   Skin:     General: Skin is warm and dry.   Neurological:      General: No focal deficit present.      Mental Status: He is alert and oriented to person, place, and time. Mental  status is at baseline.   Psychiatric:         Attention and Perception: He perceives auditory and visual hallucinations.         Mood and Affect: Mood is anxious.         Speech: Speech is slurred.         Behavior: Behavior normal.         Thought Content: Thought content is paranoid.        ???????????????????????????????????????????????????????????????  ED Course/Treatment/Medical Decision Making  MDM:  Patient is a 40-year-old male who presents with suicidal ideation with plan.  Vital signs are stable.  No sign of trauma on examination.  Given patient has a psychiatric history and been off his medications I will obtain psychiatric labs and obtain EPAT consult.  During my evaluation, he is redirectable, calm and cooperative but does endorse auditory and visual hallucinations, he is anxious appearing and has paranoid thought content but is answering my questions and commands properly.  He does not appear overtly internally stimulated.      ED Course:  ED Course as of 07/08/24 1355   Mon Jul 08, 2024   1141 CBC, CMP, tox panel wnl. [SA]   1141 Patient is medically clear [SA]   1144 EKG reviewed normal sinus rhythm rate 57, WA interval 142 ms, QRS 88 ms, QTc 362 ms, no acute ST segment elevations or depressions [SA]   1257 EPAT evaluated patient and recommend discharge, patient would like to talk to [facility]. [facility] notified [SA]   1355 Patient's Zyprexa refilled to [facility] [SA]      ED Course User Index  [SA] Ivone Taylor DO         Diagnoses as of 07/08/24 1355   Encounter for psychiatric assessment           EKG Interpretation:  See ED Course/Below:    Independent Interpretation of Studies:  I independently interpreted labs/imaging as stated in ED Course or below.    Differential diagnoses considered include but are not limited to: See MDM/Below:    Social Determinants Limiting Care:  Housing insecurity, Poor housing conditions, Food insecurity, Transportation difficulties, and Mental health disorder The  following actions were taken to address these social determinants: Patient offered evaluation by Social Work    Discussion of Management with Other Providers: See MDM/Below:    Disposition:  Discharged to [facility] detox facility    SUNDAY Swain, PGY-3    I reviewed the case with the attending ED physician. The attending ED physician agrees with the plan. Patient and/or patient´s representative was counseled regarding labs, imaging, likely diagnosis, and plan. All questions were answered.    Disclaimer: This note was dictated by speech recognition.  Attempt at proofreading was made to minimize errors.  Errors in transcription may be present.  Please call if questions.    Procedures ? SmartLinks last updated 7/8/2024 1:55 PM           Ivone Taylor, DO  Resident  07/08/24 1355       Ivone Taylor, DO  Resident  07/08/24 1433

## 2024-07-25 ENCOUNTER — HOSPITAL ENCOUNTER (EMERGENCY)
Facility: HOSPITAL | Age: 44
Discharge: HOME | End: 2024-07-25
Attending: EMERGENCY MEDICINE
Payer: COMMERCIAL

## 2024-07-25 ENCOUNTER — APPOINTMENT (OUTPATIENT)
Dept: RADIOLOGY | Facility: HOSPITAL | Age: 44
End: 2024-07-25
Payer: COMMERCIAL

## 2024-07-25 VITALS
RESPIRATION RATE: 20 BRPM | OXYGEN SATURATION: 98 % | DIASTOLIC BLOOD PRESSURE: 98 MMHG | HEART RATE: 79 BPM | SYSTOLIC BLOOD PRESSURE: 160 MMHG

## 2024-07-25 DIAGNOSIS — R45.851 SUICIDAL IDEATION: Primary | ICD-10-CM

## 2024-07-25 LAB
ALBUMIN SERPL BCP-MCNC: 4.2 G/DL (ref 3.4–5)
ALP SERPL-CCNC: 63 U/L (ref 33–120)
ALT SERPL W P-5'-P-CCNC: 21 U/L (ref 10–52)
AMPHETAMINES UR QL SCN: ABNORMAL
ANION GAP SERPL CALC-SCNC: 9 MMOL/L (ref 10–20)
APAP SERPL-MCNC: <10 UG/ML
AST SERPL W P-5'-P-CCNC: 22 U/L (ref 9–39)
BARBITURATES UR QL SCN: ABNORMAL
BASOPHILS # BLD AUTO: 0.03 X10*3/UL (ref 0–0.1)
BASOPHILS NFR BLD AUTO: 0.5 %
BENZODIAZ UR QL SCN: ABNORMAL
BILIRUB SERPL-MCNC: 0.5 MG/DL (ref 0–1.2)
BUN SERPL-MCNC: 14 MG/DL (ref 6–23)
BZE UR QL SCN: ABNORMAL
CALCIUM SERPL-MCNC: 9.3 MG/DL (ref 8.6–10.6)
CANNABINOIDS UR QL SCN: ABNORMAL
CHLORIDE SERPL-SCNC: 101 MMOL/L (ref 98–107)
CO2 SERPL-SCNC: 30 MMOL/L (ref 21–32)
CREAT SERPL-MCNC: 1.1 MG/DL (ref 0.5–1.3)
EGFRCR SERPLBLD CKD-EPI 2021: 85 ML/MIN/1.73M*2
EOSINOPHIL # BLD AUTO: 0.1 X10*3/UL (ref 0–0.7)
EOSINOPHIL NFR BLD AUTO: 1.8 %
ERYTHROCYTE [DISTWIDTH] IN BLOOD BY AUTOMATED COUNT: 13.7 % (ref 11.5–14.5)
ETHANOL SERPL-MCNC: <10 MG/DL
FENTANYL+NORFENTANYL UR QL SCN: ABNORMAL
GLUCOSE BLD MANUAL STRIP-MCNC: 94 MG/DL (ref 74–99)
GLUCOSE SERPL-MCNC: 129 MG/DL (ref 74–99)
HCT VFR BLD AUTO: 46.8 % (ref 41–52)
HGB BLD-MCNC: 14.9 G/DL (ref 13.5–17.5)
IMM GRANULOCYTES # BLD AUTO: 0.01 X10*3/UL (ref 0–0.7)
IMM GRANULOCYTES NFR BLD AUTO: 0.2 % (ref 0–0.9)
LYMPHOCYTES # BLD AUTO: 2.61 X10*3/UL (ref 1.2–4.8)
LYMPHOCYTES NFR BLD AUTO: 47.3 %
MCH RBC QN AUTO: 31.6 PG (ref 26–34)
MCHC RBC AUTO-ENTMCNC: 31.8 G/DL (ref 32–36)
MCV RBC AUTO: 99 FL (ref 80–100)
METHADONE UR QL SCN: ABNORMAL
MONOCYTES # BLD AUTO: 0.42 X10*3/UL (ref 0.1–1)
MONOCYTES NFR BLD AUTO: 7.6 %
NEUTROPHILS # BLD AUTO: 2.35 X10*3/UL (ref 1.2–7.7)
NEUTROPHILS NFR BLD AUTO: 42.6 %
NRBC BLD-RTO: 0 /100 WBCS (ref 0–0)
OPIATES UR QL SCN: ABNORMAL
OXYCODONE+OXYMORPHONE UR QL SCN: ABNORMAL
PCP UR QL SCN: ABNORMAL
PLATELET # BLD AUTO: 246 X10*3/UL (ref 150–450)
POTASSIUM SERPL-SCNC: 4 MMOL/L (ref 3.5–5.3)
PROT SERPL-MCNC: 7.4 G/DL (ref 6.4–8.2)
RBC # BLD AUTO: 4.72 X10*6/UL (ref 4.5–5.9)
SALICYLATES SERPL-MCNC: <3 MG/DL
SODIUM SERPL-SCNC: 136 MMOL/L (ref 136–145)
WBC # BLD AUTO: 5.5 X10*3/UL (ref 4.4–11.3)

## 2024-07-25 PROCEDURE — 99285 EMERGENCY DEPT VISIT HI MDM: CPT | Mod: 25

## 2024-07-25 PROCEDURE — 85025 COMPLETE CBC W/AUTO DIFF WBC: CPT

## 2024-07-25 PROCEDURE — 80053 COMPREHEN METABOLIC PANEL: CPT

## 2024-07-25 PROCEDURE — 99285 EMERGENCY DEPT VISIT HI MDM: CPT

## 2024-07-25 PROCEDURE — 82947 ASSAY GLUCOSE BLOOD QUANT: CPT | Mod: 59

## 2024-07-25 PROCEDURE — 36415 COLL VENOUS BLD VENIPUNCTURE: CPT

## 2024-07-25 PROCEDURE — 80307 DRUG TEST PRSMV CHEM ANLYZR: CPT

## 2024-07-25 PROCEDURE — 70450 CT HEAD/BRAIN W/O DYE: CPT

## 2024-07-25 PROCEDURE — 80143 DRUG ASSAY ACETAMINOPHEN: CPT

## 2024-07-25 PROCEDURE — 82947 ASSAY GLUCOSE BLOOD QUANT: CPT

## 2024-07-25 PROCEDURE — 70450 CT HEAD/BRAIN W/O DYE: CPT | Performed by: RADIOLOGY

## 2024-07-25 SDOH — HEALTH STABILITY: MENTAL HEALTH: IN THE PAST WEEK, HAVE YOU BEEN HAVING THOUGHTS ABOUT KILLING YOURSELF?: YES

## 2024-07-25 SDOH — HEALTH STABILITY: MENTAL HEALTH: ACTIVE SUICIDAL IDEATION WITH SPECIFIC PLAN AND INTENT (PAST 1 MONTH): NO

## 2024-07-25 SDOH — HEALTH STABILITY: MENTAL HEALTH: BEHAVIORS/MOOD: APPEARS INTOXICATED

## 2024-07-25 SDOH — HEALTH STABILITY: MENTAL HEALTH: ACTIVE SUICIDAL IDEATION WITH SOME INTENT TO ACT, WITHOUT SPECIFIC PLAN (PAST 1 MONTH): NO

## 2024-07-25 SDOH — HEALTH STABILITY: MENTAL HEALTH: IN THE PAST FEW WEEKS, HAVE YOU FELT THAT YOU OR YOUR FAMILY WOULD BE BETTER OFF IF YOU WERE DEAD?: YES

## 2024-07-25 SDOH — HEALTH STABILITY: MENTAL HEALTH: IN THE PAST FEW WEEKS, HAVE YOU WISHED YOU WERE DEAD?: YES

## 2024-07-25 SDOH — HEALTH STABILITY: MENTAL HEALTH: DEPRESSION SYMPTOMS: FEELINGS OF HELPLESSNESS

## 2024-07-25 SDOH — HEALTH STABILITY: MENTAL HEALTH: ANXIETY SYMPTOMS: GENERALIZED

## 2024-07-25 SDOH — HEALTH STABILITY: MENTAL HEALTH: ARE YOU HAVING THOUGHTS OF KILLING YOURSELF RIGHT NOW?: NO

## 2024-07-25 SDOH — HEALTH STABILITY: MENTAL HEALTH: WHEN DID YOU TRY TO KILL YOURSELF?: I DON'T KNOW A LONG TIME

## 2024-07-25 SDOH — HEALTH STABILITY: MENTAL HEALTH: HAVE YOU EVER TRIED TO KILL YOURSELF?: YES

## 2024-07-25 ASSESSMENT — LIFESTYLE VARIABLES
EVER FELT BAD OR GUILTY ABOUT YOUR DRINKING: NO
HAVE YOU EVER FELT YOU SHOULD CUT DOWN ON YOUR DRINKING: NO
HAVE PEOPLE ANNOYED YOU BY CRITICIZING YOUR DRINKING: NO
TOTAL SCORE: 0
PRESCIPTION_ABUSE_PAST_12_MONTHS: YES
EVER HAD A DRINK FIRST THING IN THE MORNING TO STEADY YOUR NERVES TO GET RID OF A HANGOVER: NO
SUBSTANCE_ABUSE_PAST_12_MONTHS: YES

## 2024-07-25 ASSESSMENT — PAIN - FUNCTIONAL ASSESSMENT: PAIN_FUNCTIONAL_ASSESSMENT: 0-10

## 2024-07-25 ASSESSMENT — COLUMBIA-SUICIDE SEVERITY RATING SCALE - C-SSRS
2. HAVE YOU ACTUALLY HAD ANY THOUGHTS OF KILLING YOURSELF?: YES
1. IN THE PAST MONTH, HAVE YOU WISHED YOU WERE DEAD OR WISHED YOU COULD GO TO SLEEP AND NOT WAKE UP?: YES
6. HAVE YOU EVER DONE ANYTHING, STARTED TO DO ANYTHING, OR PREPARED TO DO ANYTHING TO END YOUR LIFE?: YES
6. HAVE YOU EVER DONE ANYTHING, STARTED TO DO ANYTHING, OR PREPARED TO DO ANYTHING TO END YOUR LIFE?: YES

## 2024-07-25 ASSESSMENT — PAIN SCALES - GENERAL: PAINLEVEL_OUTOF10: 0 - NO PAIN

## 2024-07-25 NOTE — PROGRESS NOTES
EPAT - Social Work Psychiatric Assessment    Arrival Details  Mode of Arrival: Ambulatory  Admission Source: Home  Admission Type: Involuntary  EPAT Assessment Start Date: 07/25/24  EPAT Assessment Start Time: 1542  Name of : Valdo Hayeseen    History of Present Illness  Admission Reason: Suicidal Ideation, Hallucinations.  HPI: The patient is a 43 year old AA male who presented with police. The patient has a history of Psychosis, Polysubstance Use and Mood Disorder. He reports feeling suicidal earlier in the day. He reports visual hallucinations and stated they started after he used PCP. A review of his Triage, Provider and [place] was conducted.    SW Readmission Information   Readmission within 30 Days: No    Psychiatric Symptoms  Anxiety Symptoms: Generalized  Depression Symptoms: Feelings of helplessness  [place] Symptoms: No problems reported or observed.    Psychosis Symptoms  Hallucination Type: Visual  Delusion Type: Broadcasting    Additional Symptoms - Adult  Generalized Anxiety Disorder: Difficult to control worry, Excessive anxiety/worry  Obsessive Compulsive Disorder: No problems reported or observed.  Panic Attack: No problems reported or observed.  Post Traumatic Stress Disorder: No problems reported or observed.  Delirium: No problems reported or observed.  Review of Symptoms Comments: Please see above    Past Psychiatric History/Meds/Treatments  Past Psychiatric History: Patient has a past psychiatric history and polysubstance use history. He has a history of self harm and substance treatment. He currently does not follow up with providers and has a history of non compliance with appointments and medications.  Past Psychiatric Meds/Treatments: See med list. Patient is non compliant  Past Violence/Victimization History: Patient has a history of aggressive behaviors.    Current Mental Health Contacts   Name/Phone Number: none   Last Appointment Date: none  Provider  Name/Phone Number: none  Provider Last Appointment Date: none    Support System: Community    Living Arrangement: Homeless              [place] Service/Education History  Current or Previous  Service: None  Education Level: Less than high school  History of Learning Problems: No  History of School Behavior Problems: No  School History: see above    Social/Cultural History  Social History: Patient is his own guardian. His stressors are depression, homelessness and substance use.  Important Activities: Social    Legal  Legal Concerns: none currently    Drug Screening  Have you used any substances (canabis, cocaine, heroin, hallucinogens, inhalants, etc.) in the past 12 months?: Yes  Have you used any prescription drugs other than prescribed in the past 12 months?: Yes  Is a toxicology screen needed?: Yes    Stage of Change  Stage of Change: Contemplation  History of Treatment: Inpatient  Type of Treatment Offered: Inpatient  Treatment Offered: Accepted  Duration of Substance Use: daily  Frequency of Substance Use: daily  Age of First Substance Use: unknown    Psychosocial  Psychosocial (WDL): Exceptions to WDL  Behaviors/Mood: Anxious, Cooperative  Affect: Appropriate to circumstances    Orientation  Orientation Level: Oriented X4    General Appearance  Motor Activity: Restlessness  Speech Pattern: Slow  General Attitude: Cooperative, Suspicious  Appearance/Hygiene: Disheveled    Thought Process  Coherency: Other (Comment)  Content: Delusions  Delusions: Controlled  Perception: Hallucinations  Hallucination: Visual  Judgment/Insight: Limited  Confusion: None  Cognition: Follows commands    Sleep Pattern  Sleep Pattern: Other (Comment)    Risk Factors  Self Harm/Suicidal Ideation Plan: SI no current plan  Previous Self Harm/Suicidal Plans: Patient reports past attempts.  Risk Factors: None    Violence Risk Assessment  Assessment of Violence: Greater than 12 months  Thoughts of Harm to Others: No    Ability to  Assess Risk Screen  Risk Screen - Ability to Assess: Able to be screened  Ask Suicide-Screening Questions  1. In the past few weeks, have you wished you were dead?: Yes  2. In the past few weeks, have you felt that you or your family would be better off if you were dead?: Yes  3. In the past week, have you been having thoughts about killing yourself?: Yes  4. Have you ever tried to kill yourself?: Yes  When did you try to kill yourself?: I don't know a long time  5. Are you having thoughts of killing yourself right now?: No  Calculated Risk Score: Potential Risk  [place] Suicide Severity Rating Scale (Screener/Recent Self-Report)  4. Active Suicidal Ideation with Some Intent to Act, Without Specific Plan (Past 1 Month): No  5. Active Suicidal Ideation with Specific Plan and Intent (Past 1 Month): No  Calculated C-SSRS Risk Score (Lifetime/Recent): High Risk  Step 1: Risk Factors  Current & Past Psychiatric Dx: Psychotic disorder, Alcohol/substance abuse disorders  Presenting Symptoms: Impulsivity, Anxiety and/or panic, Psychosis  Precipitants/Stressors: Triggering events leading to humiliation, shame, and/or despair (e.g. loss of relationship, financial or health status) (real or anticipated), Substance intoxication or withdrawal  Change in Treatment: Non-compliant or not receiving treatment  Access to Lethal Methods : No  Step 2: Protective Factors   Protective Factors Internal: Identifies reasons for living  Protective Factors External: Cultural, spiritual and/or moral attitudes against suicide  Step 3: Suicidal Ideation Intensity  How Many Times Have You Had These Thoughts: Less than once a week  When You Have the Thoughts How Long do They Last : Fleeting - few seconds or minutes  Could/Can You Stop Thinking About Killing Yourself or Wanting to Die if You Want to: Easily able to control thoughts  Are There Things - Anyone or Anything - That Stopped You From Wanting to Die or Acting on: Deterrents definitely  "stopped you from attempting suicide  What Sort of Reasons Did You Have For Thinking About Wanting to Die or Killing Yourself: Completely to get attention, revenge, or a reaction from others  Total Score: 5  Step 5: Documentation  Risk Level: Moderate suicide risk (Patient is moderate risk. He reports earlier after taking PCP he had thoughts of suicide and wanted to walk in traffick . He denies any current plan. He is moderate risk with chronic suicidality and hallucinations. Dr. Kirby agrees.)    Psychiatric Impression and Plan of Care  Assessment and Plan: Patient is a 43 year old AA male with a history of Psychosis, Polysubstance use and Mood Disorder. Today he was brought to the ED by police after they found him walking in the street. The patient stated he was \"thinking of killing myself\". He denied current suicidal thoughts. He shared he was hallucinating that \"people were following me with a knife\". He then went on to share those same \"people\" gave him PCP and \"messed me up\". The patient admits to frequent hallucinations when using. He reports his depression comes from being homeless. He then changed the subject and stated \" I can go anywhere that let's me smoke\". We discussed this further and spoke about [facility]. The patient stated he was interested in treatment and is willing to have [facility] place him as long as he can smoke cigarettes. He then asked for some food stating he was hungry. He did not appear internally stimulated. On 7/19 he was at another area facility with a similar presentation. He is currently denying any suicidal thoughts but is moderate risk due to his presentation and past behaviors/symptoms. He reports using for the past few days and being unable to sleep. He denies any homicidal thoughts at this time. He was cooperative and engaged. [facility] to follow up with [facility] placement.  Specific Resources Provided to Patient: [facility]  CM Notified: no  PHP/IOP Recommended: none  Specific Information " Provided for PHP/IOP: none  Plan Comments: [facility] to follow up    Outcome/Disposition  Patient's Perception of Outcome Achieved: patient is help seeking, future oriented.  Assessment, Recommendations and Risk Level Reviewed with: [facility]  Contact Name: Ann Octavio  Contact Number(s): [phone]  Contact Relationship: mother  EPAT Assessment Completed Date: 07/25/24  EPAT Assessment Completed Time: 8407

## 2024-07-25 NOTE — ED TRIAGE NOTES
"Patient reports SI with plan to attempt by walking in front of traffic. Patient reports new stuttering x3 days when \"they\" gave him drugs (PCP and marijuana). Endorses AH/VH but did not specify what.   "

## 2024-07-25 NOTE — ED PROVIDER NOTES
"HPI   Chief Complaint   Patient presents with    Suicidal       HPI  Shamar Cunningham is a 43-year-old male with history of schizophrenia, depression, polysubstance abuse disorder presenting with suicidal ideation.  Patient states he began having suicidal thoughts today because he \"hears voices in my head.\"  Patient states he wants to step in front of a car to end his life.  Patient states he had suicidal thoughts a few months ago as well and wanted to hang himself however he did not attempt this.  Patient states he has had auditory hallucinations for few months.  Patient states the hallucinations tell him to hurt himself but denies the hallucinations telling him to hurt other people.  Patient states he also has visual hallucinations for few months and he sees shadows.  Patient states he took drugs from somebody on the streets a few days ago however he does not know what drugs he took.  Patient states he did not take any drugs today and he does not drink alcohol.  Patient denies homicidal ideation.  Patient denies chest pain, shortness of breath, abdominal pain, nausea, vomiting, fevers.      Patient History   Past Medical History:   Diagnosis Date    Other conditions influencing health status 07/20/2013    Closed Fracture Of Scaphoid Bone    Other conditions influencing health status     Involutional Melancholia (MDD) - Severe, With Psychotic Behavior    Pain in unspecified ankle and joints of unspecified foot     Toe joint pain    Personal history of other diseases of the nervous system and sense organs 07/10/2015    History of carpal tunnel syndrome    Personal history of other specified conditions     History of insomnia     Past Surgical History:   Procedure Laterality Date    OTHER SURGICAL HISTORY  07/10/2015    Wrist Surgery    OTHER SURGICAL HISTORY  07/10/2015    Brain Surgery     No family history on file.  Social History     Tobacco Use    Smoking status: Every Day     Types: Cigarettes    Smokeless " tobacco: Never   Substance Use Topics    Alcohol use: Not on file    Drug use: Yes     Types: PCP, Marijuana       Physical Exam   ED Triage Vitals [07/25/24 1528]   Temp Heart Rate Respirations BP   -- 70 16 (!) 135/95      Pulse Ox Temp src Heart Rate Source Patient Position   97 % -- -- --      BP Location FiO2 (%)     -- --       Physical Exam  Vitals and nursing note reviewed.   Constitutional:       Appearance: Normal appearance.   HENT:      Head: Normocephalic and atraumatic.      Mouth/Throat:      Mouth: Mucous membranes are moist.      Pharynx: Oropharynx is clear. No oropharyngeal exudate or posterior oropharyngeal erythema.   Eyes:      Extraocular Movements: Extraocular movements intact.      Conjunctiva/sclera: Conjunctivae normal.      Pupils: Pupils are equal, round, and reactive to light.   Cardiovascular:      Rate and Rhythm: Normal rate and regular rhythm.      Heart sounds: Normal heart sounds. No murmur heard.     No gallop.   Pulmonary:      Effort: Pulmonary effort is normal. No respiratory distress.      Breath sounds: Normal breath sounds. No stridor. No wheezing, rhonchi or rales.   Abdominal:      General: Abdomen is flat. Bowel sounds are normal. There is no distension.      Palpations: Abdomen is soft. There is no mass.      Tenderness: There is no abdominal tenderness. There is no guarding or rebound.      Hernia: No hernia is present.   Skin:     General: Skin is warm and dry.   Neurological:      General: No focal deficit present.      Mental Status: He is alert and oriented to person, place, and time.      Cranial Nerves: Cranial nerves 2-12 are intact. No dysarthria or facial asymmetry.      Sensory: Sensation is intact.      Motor: Motor function is intact. No pronator drift.      Coordination: Coordination is intact.      Gait: Gait is intact.   Psychiatric:         Attention and Perception: Attention normal. He perceives auditory and visual hallucinations.         Mood and  "Affect: Mood is anxious. Affect is labile.         Behavior: Behavior normal. Behavior is cooperative.         Thought Content: Thought content is not paranoid or delusional. Thought content includes suicidal ideation. Thought content does not include homicidal ideation. Thought content includes suicidal plan. Thought content does not include homicidal plan.      Comments: Patient is stuttering his words during exam            ED Course & MDM   ED Course as of 07/26/24 1349   Fri Jul 26, 2024   1313 Basophils Absolute: 0.03 [AD]      ED Course User Index  [AD] Alirio Herbert PA-C         Diagnoses as of 07/26/24 1349   Suicidal ideation                       Jac Coma Scale Score: 15                        Medical Decision Making  This is a 43-year-old male presenting with suicidal ideation. Patient states he began having suicidal thoughts today because he \"hears voices in my head.\"  Patient states he wants to step in front of a car to end his life.  Patient states the auditory hallucinations tell him to harm himself.  Patient states he has visual hallucinations as well and sees shadows.  Patient denies homicidal ideation.  When patient arrived to the emergency department he was stuttering his words.  [place] police brought the patient into the emergency department and they stated that they are familiar with the patient and he does not normally stutter his words.  Neuroexam was unremarkable today.  CT head was obtained to rule out intra cranial abnormalities since patient normally does not stutter at his baseline.  CT head shows no evidence of acute cortical infarct, mass effect, or intracranial hemorrhage.  EPAT was consulted for further evaluation of suicidal ideation and hallucinations.  CBC, CMP, acute toxicology panel, urine drug screen were obtained for medical clearance.  CBC is unremarkable.  CMP shows elevated glucose of 129.  Drug screen shows positive PCP use.  Most likely cause of patient " slurring his words are from PCP intoxication.  EPAT saw the patient in the ED today and states patient is at his baseline and normally has these current thoughts.  EPAT states the patient's does not meet criteria for an inpatient psychiatric facility and patient is safe to discharge home.  I agree with EPAT's decision that the patient's appears to be at his baseline.  Patient told EPAT that he does not want to harm himself and before patient left the emergency department he did not want to harm himself as well.  Patient told EPAT that he would like to speak to the [facility] team for PCP detox.  EPAT contacted [facility] and left a message asking [facility] to see the patient.  [facility] was also contacted multiple times by myself however they did not answer the phone.  While waiting for [facility] to see the patient the patient stated that he wanted to leave the emergency department.  Discussed with patient that he initially wanted to see [facility] for PCP detox and that he will need to wait for them to see him so he can go to a detox facility.  Patient states he no longer wants to see provide and states he does not want detox from PCP.  Patient has remained hemodynamically stable and cooperating while in the emergency department today.    Disposition: Discharge home  EPAT, the attending physician, and myself believe the patient is at his baseline and he is safe to discharge home.  Patient was was advised to follow-up with his primary care provider.  Strict return precautions were given.  Plan was discussed with patient patient understands and agrees.  Patient was discharged in stable condition.    Patient was discussed and staffed with Dr. Kirby    Procedure  Procedures     Alirio Herbert PA-C  07/26/24 6604

## 2024-07-25 NOTE — DISCHARGE INSTRUCTIONS
Please follow-up with your primary care provider.  Return to the emergency department if suicidal thoughts return.

## 2024-07-26 ENCOUNTER — CLINICAL SUPPORT (OUTPATIENT)
Dept: EMERGENCY MEDICINE | Facility: HOSPITAL | Age: 44
End: 2024-07-26
Payer: COMMERCIAL

## 2024-07-26 ENCOUNTER — HOSPITAL ENCOUNTER (EMERGENCY)
Facility: HOSPITAL | Age: 44
Discharge: HOME | End: 2024-07-26
Attending: EMERGENCY MEDICINE
Payer: COMMERCIAL

## 2024-07-26 VITALS
BODY MASS INDEX: 25.2 KG/M2 | WEIGHT: 180 LBS | HEIGHT: 71 IN | HEART RATE: 87 BPM | TEMPERATURE: 98 F | DIASTOLIC BLOOD PRESSURE: 91 MMHG | SYSTOLIC BLOOD PRESSURE: 134 MMHG | OXYGEN SATURATION: 98 % | RESPIRATION RATE: 20 BRPM

## 2024-07-26 DIAGNOSIS — Z00.8 ENCOUNTER FOR PSYCHOLOGICAL EVALUATION: Primary | ICD-10-CM

## 2024-07-26 LAB
ALBUMIN SERPL BCP-MCNC: 4.4 G/DL (ref 3.4–5)
ALP SERPL-CCNC: 66 U/L (ref 33–120)
ALT SERPL W P-5'-P-CCNC: 20 U/L (ref 10–52)
AMPHETAMINES UR QL SCN: ABNORMAL
ANION GAP SERPL CALC-SCNC: 11 MMOL/L (ref 10–20)
APAP SERPL-MCNC: <10 UG/ML
APPEARANCE UR: CLEAR
AST SERPL W P-5'-P-CCNC: 22 U/L (ref 9–39)
BARBITURATES UR QL SCN: ABNORMAL
BASOPHILS # BLD AUTO: 0.04 X10*3/UL (ref 0–0.1)
BASOPHILS NFR BLD AUTO: 0.7 %
BENZODIAZ UR QL SCN: ABNORMAL
BILIRUB SERPL-MCNC: 0.4 MG/DL (ref 0–1.2)
BILIRUB UR STRIP.AUTO-MCNC: NEGATIVE MG/DL
BUN SERPL-MCNC: 15 MG/DL (ref 6–23)
BZE UR QL SCN: ABNORMAL
CALCIUM SERPL-MCNC: 9.8 MG/DL (ref 8.6–10.6)
CANNABINOIDS UR QL SCN: ABNORMAL
CHLORIDE SERPL-SCNC: 104 MMOL/L (ref 98–107)
CO2 SERPL-SCNC: 27 MMOL/L (ref 21–32)
COLOR UR: YELLOW
CREAT SERPL-MCNC: 1.08 MG/DL (ref 0.5–1.3)
EGFRCR SERPLBLD CKD-EPI 2021: 87 ML/MIN/1.73M*2
EOSINOPHIL # BLD AUTO: 0.11 X10*3/UL (ref 0–0.7)
EOSINOPHIL NFR BLD AUTO: 1.9 %
ERYTHROCYTE [DISTWIDTH] IN BLOOD BY AUTOMATED COUNT: 13.2 % (ref 11.5–14.5)
ETHANOL SERPL-MCNC: <10 MG/DL
FENTANYL+NORFENTANYL UR QL SCN: ABNORMAL
GLUCOSE SERPL-MCNC: 80 MG/DL (ref 74–99)
GLUCOSE UR STRIP.AUTO-MCNC: NORMAL MG/DL
HCT VFR BLD AUTO: 39.9 % (ref 41–52)
HGB BLD-MCNC: 14.5 G/DL (ref 13.5–17.5)
IMM GRANULOCYTES # BLD AUTO: 0.01 X10*3/UL (ref 0–0.7)
IMM GRANULOCYTES NFR BLD AUTO: 0.2 % (ref 0–0.9)
KETONES UR STRIP.AUTO-MCNC: NEGATIVE MG/DL
LEUKOCYTE ESTERASE UR QL STRIP.AUTO: NEGATIVE
LYMPHOCYTES # BLD AUTO: 2.12 X10*3/UL (ref 1.2–4.8)
LYMPHOCYTES NFR BLD AUTO: 37.3 %
MCH RBC QN AUTO: 31.3 PG (ref 26–34)
MCHC RBC AUTO-ENTMCNC: 36.3 G/DL (ref 32–36)
MCV RBC AUTO: 86 FL (ref 80–100)
METHADONE UR QL SCN: ABNORMAL
MONOCYTES # BLD AUTO: 0.49 X10*3/UL (ref 0.1–1)
MONOCYTES NFR BLD AUTO: 8.6 %
MUCOUS THREADS #/AREA URNS AUTO: NORMAL /LPF
NEUTROPHILS # BLD AUTO: 2.91 X10*3/UL (ref 1.2–7.7)
NEUTROPHILS NFR BLD AUTO: 51.3 %
NITRITE UR QL STRIP.AUTO: NEGATIVE
NRBC BLD-RTO: 0 /100 WBCS (ref 0–0)
OPIATES UR QL SCN: ABNORMAL
OXYCODONE+OXYMORPHONE UR QL SCN: ABNORMAL
PCP UR QL SCN: ABNORMAL
PH UR STRIP.AUTO: 6 [PH]
PLATELET # BLD AUTO: 295 X10*3/UL (ref 150–450)
POTASSIUM SERPL-SCNC: 4.2 MMOL/L (ref 3.5–5.3)
PROT SERPL-MCNC: 7.7 G/DL (ref 6.4–8.2)
PROT UR STRIP.AUTO-MCNC: ABNORMAL MG/DL
RBC # BLD AUTO: 4.63 X10*6/UL (ref 4.5–5.9)
RBC # UR STRIP.AUTO: NEGATIVE /UL
RBC #/AREA URNS AUTO: NORMAL /HPF
SALICYLATES SERPL-MCNC: <3 MG/DL
SODIUM SERPL-SCNC: 138 MMOL/L (ref 136–145)
SP GR UR STRIP.AUTO: 1.02
UROBILINOGEN UR STRIP.AUTO-MCNC: ABNORMAL MG/DL
WBC # BLD AUTO: 5.7 X10*3/UL (ref 4.4–11.3)
WBC #/AREA URNS AUTO: NORMAL /HPF

## 2024-07-26 PROCEDURE — 99285 EMERGENCY DEPT VISIT HI MDM: CPT | Performed by: NURSE PRACTITIONER

## 2024-07-26 PROCEDURE — 80307 DRUG TEST PRSMV CHEM ANLYZR: CPT | Performed by: NURSE PRACTITIONER

## 2024-07-26 PROCEDURE — 36415 COLL VENOUS BLD VENIPUNCTURE: CPT | Performed by: NURSE PRACTITIONER

## 2024-07-26 PROCEDURE — 81001 URINALYSIS AUTO W/SCOPE: CPT | Performed by: NURSE PRACTITIONER

## 2024-07-26 PROCEDURE — 85025 COMPLETE CBC W/AUTO DIFF WBC: CPT | Performed by: NURSE PRACTITIONER

## 2024-07-26 PROCEDURE — 93005 ELECTROCARDIOGRAM TRACING: CPT

## 2024-07-26 PROCEDURE — 80053 COMPREHEN METABOLIC PANEL: CPT | Performed by: NURSE PRACTITIONER

## 2024-07-26 PROCEDURE — 80143 DRUG ASSAY ACETAMINOPHEN: CPT | Performed by: NURSE PRACTITIONER

## 2024-07-26 PROCEDURE — 99285 EMERGENCY DEPT VISIT HI MDM: CPT | Mod: 25

## 2024-07-26 SDOH — HEALTH STABILITY: MENTAL HEALTH: NON-SPECIFIC ACTIVE SUICIDAL THOUGHTS (PAST 1 MONTH): YES

## 2024-07-26 SDOH — HEALTH STABILITY: MENTAL HEALTH: ACTIVE SUICIDAL IDEATION WITH SOME INTENT TO ACT, WITHOUT SPECIFIC PLAN (PAST 1 MONTH): NO

## 2024-07-26 SDOH — HEALTH STABILITY: MENTAL HEALTH: DEPRESSION SYMPTOMS: NO PROBLEMS REPORTED OR OBSERVED.

## 2024-07-26 SDOH — HEALTH STABILITY: MENTAL HEALTH: WISH TO BE DEAD (PAST 1 MONTH): NO

## 2024-07-26 SDOH — HEALTH STABILITY: MENTAL HEALTH: SUICIDAL BEHAVIOR (3 MONTHS): NO

## 2024-07-26 SDOH — HEALTH STABILITY: MENTAL HEALTH: ANXIETY SYMPTOMS: NO PROBLEMS REPORTED OR OBSERVED.

## 2024-07-26 SDOH — HEALTH STABILITY: MENTAL HEALTH: SUICIDAL BEHAVIOR (LIFETIME): YES

## 2024-07-26 SDOH — HEALTH STABILITY: MENTAL HEALTH: ACTIVE SUICIDAL IDEATION WITH SPECIFIC PLAN AND INTENT (PAST 1 MONTH): NO

## 2024-07-26 ASSESSMENT — LIFESTYLE VARIABLES
EVER HAD A DRINK FIRST THING IN THE MORNING TO STEADY YOUR NERVES TO GET RID OF A HANGOVER: NO
HAVE YOU EVER FELT YOU SHOULD CUT DOWN ON YOUR DRINKING: NO
SUBSTANCE_ABUSE_PAST_12_MONTHS: YES
PRESCIPTION_ABUSE_PAST_12_MONTHS: NO
TOTAL SCORE: 0
EVER FELT BAD OR GUILTY ABOUT YOUR DRINKING: NO
HAVE PEOPLE ANNOYED YOU BY CRITICIZING YOUR DRINKING: NO

## 2024-07-26 ASSESSMENT — COLUMBIA-SUICIDE SEVERITY RATING SCALE - C-SSRS
6. HAVE YOU EVER DONE ANYTHING, STARTED TO DO ANYTHING, OR PREPARED TO DO ANYTHING TO END YOUR LIFE?: YES
1. IN THE PAST MONTH, HAVE YOU WISHED YOU WERE DEAD OR WISHED YOU COULD GO TO SLEEP AND NOT WAKE UP?: YES
4. HAVE YOU HAD THESE THOUGHTS AND HAD SOME INTENTION OF ACTING ON THEM?: YES
5. HAVE YOU STARTED TO WORK OUT OR WORKED OUT THE DETAILS OF HOW TO KILL YOURSELF? DO YOU INTEND TO CARRY OUT THIS PLAN?: YES
6. HAVE YOU EVER DONE ANYTHING, STARTED TO DO ANYTHING, OR PREPARED TO DO ANYTHING TO END YOUR LIFE?: YES
2. HAVE YOU ACTUALLY HAD ANY THOUGHTS OF KILLING YOURSELF?: YES

## 2024-07-26 ASSESSMENT — PAIN - FUNCTIONAL ASSESSMENT: PAIN_FUNCTIONAL_ASSESSMENT: 0-10

## 2024-07-26 ASSESSMENT — PAIN SCALES - GENERAL: PAINLEVEL_OUTOF10: 10 - WORST POSSIBLE PAIN

## 2024-07-26 ASSESSMENT — PAIN DESCRIPTION - LOCATION: LOCATION: BACK
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
## 2024-07-26 NOTE — ED PROVIDER NOTES
HPI   Chief Complaint   Patient presents with    Psychiatric Evaluation       Patient is a healthy nontoxic-appearing 43-year-old male with past medical history of schizophrenia, depression, polysubstance abuse, presents to the emergency room today for suicidal ideation.  Patient states he is hearing voices in his head that tell him he is worthless.  Patient states he does have a plan to step in front of oncoming traffic.  Patient states he has felt this way for some time.  Patient denies any visual or auditory hallucinations.  Patient denies any homicidal ideation.  Patient denies any headache pain, visual disturbances, numbness or tingling, chest pain, shortness of breath difficulty breathing, abdominal pain, nausea vomit, diarrhea or constipation, fever, shaking, or chills.              Patient History   Past Medical History:   Diagnosis Date    Other conditions influencing health status 07/20/2013    Closed Fracture Of Scaphoid Bone    Other conditions influencing health status     Involutional Melancholia (MDD) - Severe, With Psychotic Behavior    Pain in unspecified ankle and joints of unspecified foot     Toe joint pain    Personal history of other diseases of the nervous system and sense organs 07/10/2015    History of carpal tunnel syndrome    Personal history of other specified conditions     History of insomnia     Past Surgical History:   Procedure Laterality Date    OTHER SURGICAL HISTORY  07/10/2015    Wrist Surgery    OTHER SURGICAL HISTORY  07/10/2015    Brain Surgery     No family history on file.  Social History     Tobacco Use    Smoking status: Every Day     Types: Cigarettes    Smokeless tobacco: Never   Substance Use Topics    Alcohol use: Not on file    Drug use: Yes     Types: PCP, Marijuana       Physical Exam   ED Triage Vitals [07/26/24 1404]   Temperature Heart Rate Respirations BP   36.7 °C (98 °F) 87 20 (!) 134/91      Pulse Ox Temp Source Heart Rate Source Patient Position   98 % Oral  Monitor Sitting      BP Location FiO2 (%)     Left arm 21 %       Physical Exam  Vitals and nursing note reviewed. Exam conducted with a chaperone present.   Constitutional:       General: He is not in acute distress.     Appearance: Normal appearance. He is not ill-appearing, toxic-appearing or diaphoretic.      Interventions: He is not intubated.  HENT:      Head: Normocephalic.      Nose: Nose normal. No congestion or rhinorrhea.      Mouth/Throat:      Mouth: Mucous membranes are moist.      Pharynx: No oropharyngeal exudate or posterior oropharyngeal erythema.   Eyes:      General:         Right eye: No discharge.         Left eye: No discharge.      Extraocular Movements: Extraocular movements intact.      Pupils: Pupils are equal, round, and reactive to light.   Neck:      Vascular: No carotid bruit.   Cardiovascular:      Rate and Rhythm: Normal rate and regular rhythm.      Pulses: Normal pulses. No decreased pulses.      Heart sounds: Normal heart sounds. Heart sounds not distant. No murmur heard.     No friction rub. No gallop.   Pulmonary:      Effort: Pulmonary effort is normal. No tachypnea, bradypnea, accessory muscle usage, prolonged expiration, respiratory distress or retractions. He is not intubated.      Breath sounds: Normal breath sounds. No stridor, decreased air movement or transmitted upper airway sounds. No decreased breath sounds, wheezing, rhonchi or rales.   Chest:      Chest wall: No tenderness.   Musculoskeletal:         General: Normal range of motion.      Cervical back: Normal range of motion and neck supple. No rigidity or tenderness.   Lymphadenopathy:      Cervical: No cervical adenopathy.   Skin:     General: Skin is warm and dry.      Capillary Refill: Capillary refill takes less than 2 seconds.   Neurological:      General: No focal deficit present.      Mental Status: He is alert and oriented to person, place, and time.      Cranial Nerves: No cranial nerve deficit.       Sensory: No sensory deficit.      Motor: No weakness.      Coordination: Coordination normal.      Gait: Gait normal.      Deep Tendon Reflexes: Reflexes normal.           ED Course & MDM   ED Course as of 07/26/24 2019 Fri Jul 26, 2024   1433 EKG interpreted by myself reveals normal sinus rhythm at rate 70 bpm with no ST elevation or T wave inversion is similar in comparison to previous EKG. [EC]      ED Course User Index  [EC] Leonel Sainz, APRN-CNP         Diagnoses as of 07/26/24 2019   Encounter for psychological evaluation                       Jac Coma Scale Score: 15                        Medical Decision Making  Given patient's complaint presentation a thorough exam was performed.  Patient does present with suicidal ideation and plan.  Patient denies any visual or auditory hallucination or homicidal ideation.  Patient was recently seen evaluated emergency room for similar complaint yesterday.  Patient was evaluated by EPAT services and determined to be at baseline status.  Patient presents today for identical complaints and I suspect patient will be medically cleared and EPAT will clear patient for discharge.  Plan today for patient will be to medically clear and be evaluated by EPAT services. Lab work revealed several irregularities of any critical lab values, patient did test positive for cannabinoid and PCP and urine tox screen which is consistent with previous lab values.  Patient has been medically cleared and awaiting EPAT evaluation.  EPAT services has seen and evaluated patient recommends no immediate intervention at this time.  Previous emergency room documentation was reviewed as well and this is consistent with patient's baseline status.  Patient was then requesting to speak to [facility].  [facility] has been down to evaluate patient.  I encouraged patient establish care with primary care provider and monitor symptoms, if they become worse return to emergency room for further evaluation,  otherwise follow-up with psychiatrist or primary care provider as needed.  Patient was agreeable this plan discharged home in stable condition.    ILIA Snyder     Portions of this note were generated using digital voice recognition software, and may contain grammatical errors      Procedure  Procedures     ILIA Snyder  07/26/24 2019
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
## 2024-08-31 ENCOUNTER — HOSPITAL ENCOUNTER (EMERGENCY)
Facility: HOSPITAL | Age: 44
Discharge: HOME | End: 2024-08-31
Attending: EMERGENCY MEDICINE
Payer: COMMERCIAL

## 2024-08-31 ENCOUNTER — CLINICAL SUPPORT (OUTPATIENT)
Dept: EMERGENCY MEDICINE | Facility: HOSPITAL | Age: 44
End: 2024-08-31
Payer: COMMERCIAL

## 2024-08-31 VITALS
SYSTOLIC BLOOD PRESSURE: 123 MMHG | RESPIRATION RATE: 16 BRPM | DIASTOLIC BLOOD PRESSURE: 77 MMHG | HEART RATE: 63 BPM | OXYGEN SATURATION: 97 %

## 2024-08-31 DIAGNOSIS — R44.3 HALLUCINATIONS: Primary | ICD-10-CM

## 2024-08-31 LAB
ALBUMIN SERPL BCP-MCNC: 4.2 G/DL (ref 3.4–5)
ALP SERPL-CCNC: 65 U/L (ref 33–120)
ALT SERPL W P-5'-P-CCNC: 15 U/L (ref 10–52)
ANION GAP SERPL CALC-SCNC: 12 MMOL/L (ref 10–20)
APAP SERPL-MCNC: <10 UG/ML
AST SERPL W P-5'-P-CCNC: 21 U/L (ref 9–39)
ATRIAL RATE: 57 BPM
BASOPHILS # BLD AUTO: 0.03 X10*3/UL (ref 0–0.1)
BASOPHILS NFR BLD AUTO: 0.6 %
BILIRUB SERPL-MCNC: 0.5 MG/DL (ref 0–1.2)
BUN SERPL-MCNC: 16 MG/DL (ref 6–23)
CALCIUM SERPL-MCNC: 9.7 MG/DL (ref 8.6–10.6)
CHLORIDE SERPL-SCNC: 103 MMOL/L (ref 98–107)
CO2 SERPL-SCNC: 26 MMOL/L (ref 21–32)
CREAT SERPL-MCNC: 1.12 MG/DL (ref 0.5–1.3)
EGFRCR SERPLBLD CKD-EPI 2021: 84 ML/MIN/1.73M*2
EOSINOPHIL # BLD AUTO: 0.08 X10*3/UL (ref 0–0.7)
EOSINOPHIL NFR BLD AUTO: 1.6 %
ERYTHROCYTE [DISTWIDTH] IN BLOOD BY AUTOMATED COUNT: 12.9 % (ref 11.5–14.5)
ETHANOL SERPL-MCNC: <10 MG/DL
GLUCOSE SERPL-MCNC: 103 MG/DL (ref 74–99)
HCT VFR BLD AUTO: 41.9 % (ref 41–52)
HGB BLD-MCNC: 15.3 G/DL (ref 13.5–17.5)
IMM GRANULOCYTES # BLD AUTO: 0.01 X10*3/UL (ref 0–0.7)
IMM GRANULOCYTES NFR BLD AUTO: 0.2 % (ref 0–0.9)
LYMPHOCYTES # BLD AUTO: 2.03 X10*3/UL (ref 1.2–4.8)
LYMPHOCYTES NFR BLD AUTO: 39.7 %
MCH RBC QN AUTO: 32.2 PG (ref 26–34)
MCHC RBC AUTO-ENTMCNC: 36.5 G/DL (ref 32–36)
MCV RBC AUTO: 88 FL (ref 80–100)
MONOCYTES # BLD AUTO: 0.49 X10*3/UL (ref 0.1–1)
MONOCYTES NFR BLD AUTO: 9.6 %
NEUTROPHILS # BLD AUTO: 2.47 X10*3/UL (ref 1.2–7.7)
NEUTROPHILS NFR BLD AUTO: 48.3 %
NRBC BLD-RTO: 0 /100 WBCS (ref 0–0)
P AXIS: 48 DEGREES
P OFFSET: 200 MS
P ONSET: 153 MS
PLATELET # BLD AUTO: 332 X10*3/UL (ref 150–450)
POTASSIUM SERPL-SCNC: 4 MMOL/L (ref 3.5–5.3)
PR INTERVAL: 130 MS
PROT SERPL-MCNC: 7.5 G/DL (ref 6.4–8.2)
Q ONSET: 218 MS
QRS COUNT: 9 BEATS
QRS DURATION: 70 MS
QT INTERVAL: 382 MS
QTC CALCULATION(BAZETT): 371 MS
QTC FREDERICIA: 375 MS
R AXIS: 53 DEGREES
RBC # BLD AUTO: 4.75 X10*6/UL (ref 4.5–5.9)
SALICYLATES SERPL-MCNC: <3 MG/DL
SODIUM SERPL-SCNC: 137 MMOL/L (ref 136–145)
T AXIS: 46 DEGREES
T OFFSET: 409 MS
VENTRICULAR RATE: 57 BPM
WBC # BLD AUTO: 5.1 X10*3/UL (ref 4.4–11.3)

## 2024-08-31 PROCEDURE — 93005 ELECTROCARDIOGRAM TRACING: CPT

## 2024-08-31 PROCEDURE — 2500000001 HC RX 250 WO HCPCS SELF ADMINISTERED DRUGS (ALT 637 FOR MEDICARE OP): Mod: SE

## 2024-08-31 PROCEDURE — 80053 COMPREHEN METABOLIC PANEL: CPT

## 2024-08-31 PROCEDURE — 80143 DRUG ASSAY ACETAMINOPHEN: CPT

## 2024-08-31 PROCEDURE — 99285 EMERGENCY DEPT VISIT HI MDM: CPT

## 2024-08-31 PROCEDURE — 85025 COMPLETE CBC W/AUTO DIFF WBC: CPT

## 2024-08-31 PROCEDURE — 93010 ELECTROCARDIOGRAM REPORT: CPT

## 2024-08-31 PROCEDURE — 36415 COLL VENOUS BLD VENIPUNCTURE: CPT

## 2024-08-31 PROCEDURE — 99283 EMERGENCY DEPT VISIT LOW MDM: CPT

## 2024-08-31 RX ORDER — BENZONATATE 100 MG/1
100 CAPSULE ORAL 3 TIMES DAILY PRN
Status: DISCONTINUED | OUTPATIENT
Start: 2024-08-31 | End: 2024-08-31 | Stop reason: HOSPADM

## 2024-08-31 RX ADMIN — BENZONATATE 100 MG: 100 CAPSULE ORAL at 15:04

## 2024-08-31 SDOH — HEALTH STABILITY: MENTAL HEALTH: HAVE YOU EVER TRIED TO KILL YOURSELF?: YES

## 2024-08-31 SDOH — HEALTH STABILITY: MENTAL HEALTH: HOW DID YOU TRY TO KILL YOURSELF?: CUTTING, HANGING

## 2024-08-31 SDOH — HEALTH STABILITY: MENTAL HEALTH: ANXIETY SYMPTOMS: NO PROBLEMS REPORTED OR OBSERVED.

## 2024-08-31 SDOH — ECONOMIC STABILITY: HOUSING INSECURITY: FEELS SAFE LIVING IN HOME: NO

## 2024-08-31 SDOH — HEALTH STABILITY: MENTAL HEALTH: ACTIVE SUICIDAL IDEATION WITH SPECIFIC PLAN AND INTENT (PAST 1 MONTH): NO

## 2024-08-31 SDOH — HEALTH STABILITY: MENTAL HEALTH: DEPRESSION SYMPTOMS: NO PROBLEMS REPORTED OR OBSERVED.

## 2024-08-31 SDOH — HEALTH STABILITY: MENTAL HEALTH: SUICIDAL BEHAVIOR (LIFETIME): YES

## 2024-08-31 SDOH — HEALTH STABILITY: MENTAL HEALTH: IN THE PAST FEW WEEKS, HAVE YOU WISHED YOU WERE DEAD?: NO

## 2024-08-31 SDOH — HEALTH STABILITY: MENTAL HEALTH: ACTIVE SUICIDAL IDEATION WITH SOME INTENT TO ACT, WITHOUT SPECIFIC PLAN (PAST 1 MONTH): NO

## 2024-08-31 SDOH — HEALTH STABILITY: MENTAL HEALTH: SUICIDAL BEHAVIOR (3 MONTHS): NO

## 2024-08-31 SDOH — HEALTH STABILITY: MENTAL HEALTH: NON-SPECIFIC ACTIVE SUICIDAL THOUGHTS (PAST 1 MONTH): YES

## 2024-08-31 SDOH — HEALTH STABILITY: MENTAL HEALTH

## 2024-08-31 SDOH — HEALTH STABILITY: MENTAL HEALTH: IN THE PAST FEW WEEKS, HAVE YOU FELT THAT YOU OR YOUR FAMILY WOULD BE BETTER OFF IF YOU WERE DEAD?: NO

## 2024-08-31 SDOH — HEALTH STABILITY: MENTAL HEALTH: BEHAVIORS/MOOD: ANXIOUS;COOPERATIVE

## 2024-08-31 SDOH — HEALTH STABILITY: MENTAL HEALTH: ARE YOU HAVING THOUGHTS OF KILLING YOURSELF RIGHT NOW?: NO

## 2024-08-31 SDOH — HEALTH STABILITY: MENTAL HEALTH: WISH TO BE DEAD (PAST 1 MONTH): NO

## 2024-08-31 SDOH — HEALTH STABILITY: MENTAL HEALTH: IN THE PAST WEEK, HAVE YOU BEEN HAVING THOUGHTS ABOUT KILLING YOURSELF?: YES

## 2024-08-31 ASSESSMENT — LIFESTYLE VARIABLES
PRESCIPTION_ABUSE_PAST_12_MONTHS: NO
SUBSTANCE_ABUSE_PAST_12_MONTHS: YES

## 2024-08-31 ASSESSMENT — COLUMBIA-SUICIDE SEVERITY RATING SCALE - C-SSRS
2. HAVE YOU ACTUALLY HAD ANY THOUGHTS OF KILLING YOURSELF?: YES
5. HAVE YOU STARTED TO WORK OUT OR WORKED OUT THE DETAILS OF HOW TO KILL YOURSELF? DO YOU INTEND TO CARRY OUT THIS PLAN?: YES
4. HAVE YOU HAD THESE THOUGHTS AND HAD SOME INTENTION OF ACTING ON THEM?: YES
1. IN THE PAST MONTH, HAVE YOU WISHED YOU WERE DEAD OR WISHED YOU COULD GO TO SLEEP AND NOT WAKE UP?: YES
6. HAVE YOU EVER DONE ANYTHING, STARTED TO DO ANYTHING, OR PREPARED TO DO ANYTHING TO END YOUR LIFE?: YES
6. HAVE YOU EVER DONE ANYTHING, STARTED TO DO ANYTHING, OR PREPARED TO DO ANYTHING TO END YOUR LIFE?: YES

## 2024-08-31 NOTE — ED TRIAGE NOTES
Pt presents to the ED by [facility] with c/o of hearing voices telling him to harm himself by walking infront of traffic. Pt says that his brother was killed recently and says he is scared that the people are coming after him next.
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
## 2024-11-16 ENCOUNTER — APPOINTMENT (OUTPATIENT)
Dept: RADIOLOGY | Facility: HOSPITAL | Age: 44
End: 2024-11-16
Payer: COMMERCIAL

## 2024-11-16 ENCOUNTER — CLINICAL SUPPORT (OUTPATIENT)
Dept: EMERGENCY MEDICINE | Facility: HOSPITAL | Age: 44
End: 2024-11-16
Payer: COMMERCIAL

## 2024-11-16 ENCOUNTER — HOSPITAL ENCOUNTER (EMERGENCY)
Facility: HOSPITAL | Age: 44
Discharge: HOME | End: 2024-11-16
Attending: STUDENT IN AN ORGANIZED HEALTH CARE EDUCATION/TRAINING PROGRAM
Payer: COMMERCIAL

## 2024-11-16 VITALS
HEIGHT: 71 IN | SYSTOLIC BLOOD PRESSURE: 132 MMHG | DIASTOLIC BLOOD PRESSURE: 92 MMHG | OXYGEN SATURATION: 100 % | WEIGHT: 190 LBS | TEMPERATURE: 98.6 F | BODY MASS INDEX: 26.6 KG/M2 | HEART RATE: 63 BPM | RESPIRATION RATE: 18 BRPM

## 2024-11-16 DIAGNOSIS — R45.89 SUICIDAL BEHAVIOR WITHOUT ATTEMPTED SELF-INJURY: Primary | ICD-10-CM

## 2024-11-16 DIAGNOSIS — F19.10 SUBSTANCE ABUSE (MULTI): ICD-10-CM

## 2024-11-16 LAB
ALBUMIN SERPL BCP-MCNC: 4.4 G/DL (ref 3.4–5)
ALP SERPL-CCNC: 73 U/L (ref 33–120)
ALT SERPL W P-5'-P-CCNC: 9 U/L (ref 10–52)
AMPHETAMINES UR QL SCN: ABNORMAL
ANION GAP SERPL CALC-SCNC: 15 MMOL/L (ref 10–20)
APAP SERPL-MCNC: <10 UG/ML
AST SERPL W P-5'-P-CCNC: 21 U/L (ref 9–39)
ATRIAL RATE: 80 BPM
BARBITURATES UR QL SCN: ABNORMAL
BASOPHILS # BLD AUTO: 0.03 X10*3/UL (ref 0–0.1)
BASOPHILS NFR BLD AUTO: 0.5 %
BENZODIAZ UR QL SCN: ABNORMAL
BILIRUB SERPL-MCNC: 0.6 MG/DL (ref 0–1.2)
BUN SERPL-MCNC: 16 MG/DL (ref 6–23)
BZE UR QL SCN: ABNORMAL
CALCIUM SERPL-MCNC: 9.7 MG/DL (ref 8.6–10.6)
CANNABINOIDS UR QL SCN: ABNORMAL
CHLORIDE SERPL-SCNC: 105 MMOL/L (ref 98–107)
CO2 SERPL-SCNC: 23 MMOL/L (ref 21–32)
CREAT SERPL-MCNC: 1.25 MG/DL (ref 0.5–1.3)
EGFRCR SERPLBLD CKD-EPI 2021: 73 ML/MIN/1.73M*2
EOSINOPHIL # BLD AUTO: 0.08 X10*3/UL (ref 0–0.7)
EOSINOPHIL NFR BLD AUTO: 1.4 %
ERYTHROCYTE [DISTWIDTH] IN BLOOD BY AUTOMATED COUNT: 12.3 % (ref 11.5–14.5)
ETHANOL SERPL-MCNC: <10 MG/DL
FENTANYL+NORFENTANYL UR QL SCN: ABNORMAL
GLUCOSE SERPL-MCNC: 127 MG/DL (ref 74–99)
HCT VFR BLD AUTO: 41.7 % (ref 41–52)
HGB BLD-MCNC: 15.2 G/DL (ref 13.5–17.5)
IMM GRANULOCYTES # BLD AUTO: 0.01 X10*3/UL (ref 0–0.7)
IMM GRANULOCYTES NFR BLD AUTO: 0.2 % (ref 0–0.9)
LYMPHOCYTES # BLD AUTO: 2.53 X10*3/UL (ref 1.2–4.8)
LYMPHOCYTES NFR BLD AUTO: 44.2 %
MCH RBC QN AUTO: 31.4 PG (ref 26–34)
MCHC RBC AUTO-ENTMCNC: 36.5 G/DL (ref 32–36)
MCV RBC AUTO: 86 FL (ref 80–100)
METHADONE UR QL SCN: ABNORMAL
MONOCYTES # BLD AUTO: 0.5 X10*3/UL (ref 0.1–1)
MONOCYTES NFR BLD AUTO: 8.7 %
NEUTROPHILS # BLD AUTO: 2.58 X10*3/UL (ref 1.2–7.7)
NEUTROPHILS NFR BLD AUTO: 45 %
NRBC BLD-RTO: 0 /100 WBCS (ref 0–0)
OPIATES UR QL SCN: ABNORMAL
OXYCODONE+OXYMORPHONE UR QL SCN: ABNORMAL
P AXIS: 65 DEGREES
P OFFSET: 204 MS
P ONSET: 158 MS
PCP UR QL SCN: ABNORMAL
PLATELET # BLD AUTO: 283 X10*3/UL (ref 150–450)
POTASSIUM SERPL-SCNC: 4 MMOL/L (ref 3.5–5.3)
PR INTERVAL: 118 MS
PROT SERPL-MCNC: 7.6 G/DL (ref 6.4–8.2)
Q ONSET: 217 MS
QRS COUNT: 13 BEATS
QRS DURATION: 92 MS
QT INTERVAL: 334 MS
QTC CALCULATION(BAZETT): 385 MS
QTC FREDERICIA: 367 MS
R AXIS: 69 DEGREES
RBC # BLD AUTO: 4.84 X10*6/UL (ref 4.5–5.9)
SALICYLATES SERPL-MCNC: <3 MG/DL
SODIUM SERPL-SCNC: 139 MMOL/L (ref 136–145)
T AXIS: -2 DEGREES
T OFFSET: 384 MS
VENTRICULAR RATE: 80 BPM
WBC # BLD AUTO: 5.7 X10*3/UL (ref 4.4–11.3)

## 2024-11-16 PROCEDURE — 36415 COLL VENOUS BLD VENIPUNCTURE: CPT | Performed by: NURSE PRACTITIONER

## 2024-11-16 PROCEDURE — 80143 DRUG ASSAY ACETAMINOPHEN: CPT | Performed by: NURSE PRACTITIONER

## 2024-11-16 PROCEDURE — 80053 COMPREHEN METABOLIC PANEL: CPT | Performed by: NURSE PRACTITIONER

## 2024-11-16 PROCEDURE — 70450 CT HEAD/BRAIN W/O DYE: CPT

## 2024-11-16 PROCEDURE — 93005 ELECTROCARDIOGRAM TRACING: CPT

## 2024-11-16 PROCEDURE — 99285 EMERGENCY DEPT VISIT HI MDM: CPT | Performed by: NURSE PRACTITIONER

## 2024-11-16 PROCEDURE — 99285 EMERGENCY DEPT VISIT HI MDM: CPT | Mod: 25

## 2024-11-16 PROCEDURE — 70450 CT HEAD/BRAIN W/O DYE: CPT | Performed by: RADIOLOGY

## 2024-11-16 PROCEDURE — 72100 X-RAY EXAM L-S SPINE 2/3 VWS: CPT | Mod: FOREIGN READ | Performed by: RADIOLOGY

## 2024-11-16 PROCEDURE — 85025 COMPLETE CBC W/AUTO DIFF WBC: CPT | Performed by: NURSE PRACTITIONER

## 2024-11-16 PROCEDURE — 2500000001 HC RX 250 WO HCPCS SELF ADMINISTERED DRUGS (ALT 637 FOR MEDICARE OP): Mod: SE | Performed by: NURSE PRACTITIONER

## 2024-11-16 PROCEDURE — 93010 ELECTROCARDIOGRAM REPORT: CPT | Performed by: NURSE PRACTITIONER

## 2024-11-16 PROCEDURE — 72100 X-RAY EXAM L-S SPINE 2/3 VWS: CPT

## 2024-11-16 PROCEDURE — 80307 DRUG TEST PRSMV CHEM ANLYZR: CPT | Performed by: NURSE PRACTITIONER

## 2024-11-16 RX ORDER — ACETAMINOPHEN 325 MG/1
975 TABLET ORAL ONCE
Status: COMPLETED | OUTPATIENT
Start: 2024-11-16 | End: 2024-11-16

## 2024-11-16 RX ORDER — LORAZEPAM 0.5 MG/1
1 TABLET ORAL ONCE
Status: COMPLETED | OUTPATIENT
Start: 2024-11-16 | End: 2024-11-16

## 2024-11-16 RX ADMIN — ACETAMINOPHEN 975 MG: 325 TABLET ORAL at 12:50

## 2024-11-16 RX ADMIN — LORAZEPAM 1 MG: 0.5 TABLET ORAL at 12:50

## 2024-11-16 SDOH — HEALTH STABILITY: MENTAL HEALTH: BEHAVIORAL HEALTH(WDL): EXCEPTIONS TO WDL

## 2024-11-16 SDOH — HEALTH STABILITY: MENTAL HEALTH: ACTIVE SUICIDAL IDEATION WITH SOME INTENT TO ACT, WITHOUT SPECIFIC PLAN (PAST 1 MONTH): NO

## 2024-11-16 SDOH — HEALTH STABILITY: MENTAL HEALTH: BEHAVIORS/MOOD: ANXIOUS;CRYING;COOPERATIVE;HALLUCINATIONS;PLEASANT;SAD

## 2024-11-16 SDOH — HEALTH STABILITY: MENTAL HEALTH: DEPRESSION SYMPTOMS: CRYING;SLEEP DISTURBANCE;APPETITE CHANGE;IMPAIRED CONCENTRATION;INCREASED IRRITABILITY

## 2024-11-16 SDOH — HEALTH STABILITY: MENTAL HEALTH: SLEEP PATTERN: UNABLE TO ASSESS

## 2024-11-16 SDOH — HEALTH STABILITY: MENTAL HEALTH: HAVE YOU EVER DONE ANYTHING, STARTED TO DO ANYTHING, OR PREPARED TO DO ANYTHING TO END YOUR LIFE?: YES

## 2024-11-16 SDOH — HEALTH STABILITY: MENTAL HEALTH: SUICIDAL BEHAVIOR (LIFETIME): YES

## 2024-11-16 SDOH — HEALTH STABILITY: MENTAL HEALTH: SUICIDAL BEHAVIOR (DESCRIPTION): HANGING

## 2024-11-16 SDOH — HEALTH STABILITY: MENTAL HEALTH: HAVE YOU EVER TRIED TO KILL YOURSELF?: YES

## 2024-11-16 SDOH — HEALTH STABILITY: MENTAL HEALTH: ACTIVE SUICIDAL IDEATION WITH SPECIFIC PLAN AND INTENT (PAST 1 MONTH): NO

## 2024-11-16 SDOH — HEALTH STABILITY: MENTAL HEALTH: HOW DID YOU TRY TO KILL YOURSELF?: HANGING

## 2024-11-16 SDOH — HEALTH STABILITY: MENTAL HEALTH: WISH TO BE DEAD (PAST 1 MONTH): YES

## 2024-11-16 SDOH — HEALTH STABILITY: MENTAL HEALTH: HAVE YOU HAD THESE THOUGHTS AND HAD SOME INTENTION OF ACTING ON THEM?: YES

## 2024-11-16 SDOH — HEALTH STABILITY: MENTAL HEALTH: SUICIDAL BEHAVIOR (3 MONTHS): YES

## 2024-11-16 SDOH — HEALTH STABILITY: MENTAL HEALTH: IN THE PAST FEW WEEKS, HAVE YOU FELT THAT YOU OR YOUR FAMILY WOULD BE BETTER OFF IF YOU WERE DEAD?: NO

## 2024-11-16 SDOH — HEALTH STABILITY: MENTAL HEALTH: HAVE YOU ACTUALLY HAD ANY THOUGHTS OF KILLING YOURSELF?: YES

## 2024-11-16 SDOH — HEALTH STABILITY: MENTAL HEALTH: NEEDS EXPRESSED: EMOTIONAL

## 2024-11-16 SDOH — HEALTH STABILITY: MENTAL HEALTH: IN THE PAST FEW WEEKS, HAVE YOU WISHED YOU WERE DEAD?: YES

## 2024-11-16 SDOH — HEALTH STABILITY: MENTAL HEALTH: NON-SPECIFIC ACTIVE SUICIDAL THOUGHTS (PAST 1 MONTH): YES

## 2024-11-16 SDOH — SOCIAL STABILITY: SOCIAL NETWORK: PARENT/GUARDIAN/SIGNIFICANT OTHER INVOLVEMENT: NO INVOLVEMENT

## 2024-11-16 SDOH — SOCIAL STABILITY: SOCIAL NETWORK: VISITOR BEHAVIORS: UNABLE TO ASSESS

## 2024-11-16 SDOH — SOCIAL STABILITY: SOCIAL INSECURITY: FAMILY BEHAVIORS: UNABLE TO ASSESS

## 2024-11-16 SDOH — HEALTH STABILITY: MENTAL HEALTH: HAVE YOU WISHED YOU WERE DEAD OR WISHED YOU COULD GO TO SLEEP AND NOT WAKE UP?: YES

## 2024-11-16 SDOH — SOCIAL STABILITY: SOCIAL NETWORK: EMOTIONAL SUPPORT GIVEN: REASSURE

## 2024-11-16 SDOH — HEALTH STABILITY: MENTAL HEALTH: IN THE PAST WEEK, HAVE YOU BEEN HAVING THOUGHTS ABOUT KILLING YOURSELF?: YES

## 2024-11-16 SDOH — HEALTH STABILITY: MENTAL HEALTH: SUICIDE ASSESSMENT: ADULT (C-SSRS)

## 2024-11-16 SDOH — HEALTH STABILITY: MENTAL HEALTH: ARE YOU HAVING THOUGHTS OF KILLING YOURSELF RIGHT NOW?: NO

## 2024-11-16 SDOH — ECONOMIC STABILITY: GENERAL

## 2024-11-16 SDOH — HEALTH STABILITY: MENTAL HEALTH: HAVE YOU BEEN THINKING ABOUT HOW YOU MIGHT DO THIS?: YES

## 2024-11-16 SDOH — HEALTH STABILITY: MENTAL HEALTH: HALLUCINATION: AUDITORY

## 2024-11-16 SDOH — HEALTH STABILITY: MENTAL HEALTH: WAS THIS WITHIN THE PAST THREE MONTHS?: YES

## 2024-11-16 SDOH — ECONOMIC STABILITY: HOUSING INSECURITY: FEELS SAFE LIVING IN HOME: NO

## 2024-11-16 SDOH — HEALTH STABILITY: MENTAL HEALTH: ANXIETY SYMPTOMS: GENERALIZED

## 2024-11-16 SDOH — HEALTH STABILITY: MENTAL HEALTH: WHEN DID YOU TRY TO KILL YOURSELF?: "A COUPLE OF MONTHS AGO", PER PATIENT REPORT.

## 2024-11-16 ASSESSMENT — COLUMBIA-SUICIDE SEVERITY RATING SCALE - C-SSRS
6. HAVE YOU EVER DONE ANYTHING, STARTED TO DO ANYTHING, OR PREPARED TO DO ANYTHING TO END YOUR LIFE?: YES
1. IN THE PAST MONTH, HAVE YOU WISHED YOU WERE DEAD OR WISHED YOU COULD GO TO SLEEP AND NOT WAKE UP?: YES
2. HAVE YOU ACTUALLY HAD ANY THOUGHTS OF KILLING YOURSELF?: YES
6. HAVE YOU EVER DONE ANYTHING, STARTED TO DO ANYTHING, OR PREPARED TO DO ANYTHING TO END YOUR LIFE?: YES
4. HAVE YOU HAD THESE THOUGHTS AND HAD SOME INTENTION OF ACTING ON THEM?: NO
5. HAVE YOU STARTED TO WORK OUT OR WORKED OUT THE DETAILS OF HOW TO KILL YOURSELF? DO YOU INTEND TO CARRY OUT THIS PLAN?: NO

## 2024-11-16 ASSESSMENT — PAIN SCALES - GENERAL: PAINLEVEL_OUTOF10: 0 - NO PAIN

## 2024-11-16 ASSESSMENT — PAIN - FUNCTIONAL ASSESSMENT: PAIN_FUNCTIONAL_ASSESSMENT: 0-10

## 2024-11-16 ASSESSMENT — LIFESTYLE VARIABLES
SUBSTANCE_ABUSE_PAST_12_MONTHS: YES
PRESCIPTION_ABUSE_PAST_12_MONTHS: NO

## 2024-11-16 NOTE — ED PROVIDER NOTES
HPI   Chief Complaint   Patient presents with    Psychiatric Evaluation     Hallucinations, SI       HPI  This is a 43 year old male with past medical history significant for schizoaffective disorder presents to the ED today stating that he is hearing voices which they are telling him to kill himself. His plan is to jump in front of traffic.   He has attempted suicide in the past.   Denies HI and denies wanting to hurt others.       Patient History   Past Medical History:   Diagnosis Date    Other conditions influencing health status 07/20/2013    Closed Fracture Of Scaphoid Bone    Other conditions influencing health status     Involutional Melancholia (MDD) - Severe, With Psychotic Behavior    Pain in unspecified ankle and joints of unspecified foot     Toe joint pain    Personal history of other diseases of the nervous system and sense organs 07/10/2015    History of carpal tunnel syndrome    Personal history of other specified conditions     History of insomnia     Past Surgical History:   Procedure Laterality Date    OTHER SURGICAL HISTORY  07/10/2015    Wrist Surgery    OTHER SURGICAL HISTORY  07/10/2015    Brain Surgery     No family history on file.  Social History     Tobacco Use    Smoking status: Every Day     Types: Cigarettes    Smokeless tobacco: Never   Substance Use Topics    Alcohol use: Not on file    Drug use: Yes     Types: PCP, Marijuana       Physical Exam   ED Triage Vitals [11/16/24 1104]   Temperature Heart Rate Respirations BP   37 °C (98.6 °F) 100 16 142/90      SpO2 Temp src Heart Rate Source Patient Position   -- -- Monitor Sitting      BP Location FiO2 (%)     Left arm --       Physical Exam  Constitutional:       Appearance: Normal appearance.   HENT:      Head: Normocephalic and atraumatic.      Mouth/Throat:      Mouth: Mucous membranes are moist.   Eyes:      Extraocular Movements: Extraocular movements intact.      Pupils: Pupils are equal, round, and reactive to light.    Cardiovascular:      Rate and Rhythm: Normal rate and regular rhythm.   Pulmonary:      Effort: Pulmonary effort is normal.      Breath sounds: Normal breath sounds.   Abdominal:      Palpations: Abdomen is soft.   Musculoskeletal:         General: Normal range of motion.      Cervical back: Normal range of motion and neck supple.   Skin:     General: Skin is warm and dry.   Neurological:      General: No focal deficit present.      Mental Status: He is alert and oriented to person, place, and time.   Psychiatric:         Mood and Affect: Mood normal.         Behavior: Behavior normal.           ED Course & MDM   Diagnoses as of 11/16/24 8098   Substance abuse (Multi)   Suicidal behavior without attempted self-injury                 No data recorded     [place] Coma Scale Score: 14 (11/16/24 1108 : Christiano Montano RN)                           Medical Decision Making  This is a 43 year old male with past medical history significant for schizoaffective disorder presents to the ED today stating that he is hearing voices which they are telling him to kill himself. His plan is to jump in front of traffic.   He has attempted suicide in the past.   Denies HI and denies wanting to hurt others.   ECG showed HR of 80, NE interval of 118 , no axis deviation and with non-specific T-wave abnormalities.     Labs showed no acute or concerning abnormalities. He told the attending that he had a fall two weeks ago and hit his head and hurt his back. CT head and xray L-spine were both negative.   He was seen by EPAT who did not recommend inpatient psych admission. [facility] was asked to see the patient and facilitate an inpatient rehab facility for him.   They are actively working on placing him at this time.   The patient was staffed with Dr. Cherie Valle.     Procedure  Procedures     Deborah Bailey, APRN-CNP, DNP  11/16/24 5735
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
## 2025-01-05 ENCOUNTER — HOSPITAL ENCOUNTER (INPATIENT)
Facility: HOSPITAL | Age: 45
LOS: 4 days | Discharge: HOME | End: 2025-01-10
Attending: EMERGENCY MEDICINE | Admitting: INTERNAL MEDICINE
Payer: COMMERCIAL

## 2025-01-05 ENCOUNTER — APPOINTMENT (OUTPATIENT)
Dept: RADIOLOGY | Facility: HOSPITAL | Age: 45
End: 2025-01-05
Payer: COMMERCIAL

## 2025-01-05 DIAGNOSIS — N17.9 AKI (ACUTE KIDNEY INJURY) (CMS-HCC): ICD-10-CM

## 2025-01-05 DIAGNOSIS — S02.92XA CLOSED FRACTURE OF FACIAL BONE, UNSPECIFIED FACIAL BONE, INITIAL ENCOUNTER (MULTI): ICD-10-CM

## 2025-01-05 DIAGNOSIS — J96.01 ACUTE HYPOXIC RESPIRATORY FAILURE (MULTI): ICD-10-CM

## 2025-01-05 DIAGNOSIS — I46.9 CARDIAC ARREST: Primary | ICD-10-CM

## 2025-01-05 DIAGNOSIS — F16.921: ICD-10-CM

## 2025-01-05 DIAGNOSIS — R53.81 PHYSICAL DECONDITIONING: ICD-10-CM

## 2025-01-05 DIAGNOSIS — S01.111A LACERATION OF RIGHT EYEBROW, INITIAL ENCOUNTER: ICD-10-CM

## 2025-01-05 LAB
ALBUMIN SERPL BCP-MCNC: 4.2 G/DL (ref 3.4–5)
ALP SERPL-CCNC: 65 U/L (ref 33–120)
ALT SERPL W P-5'-P-CCNC: 29 U/L (ref 10–52)
ANION GAP BLDV CALCULATED.4IONS-SCNC: 14 MMOL/L (ref 10–25)
ANION GAP BLDV CALCULATED.4IONS-SCNC: 20 MMOL/L (ref 10–25)
ANION GAP SERPL CALC-SCNC: 23 MMOL/L (ref 10–20)
AST SERPL W P-5'-P-CCNC: 69 U/L (ref 9–39)
BASE EXCESS BLDV CALC-SCNC: -11.7 MMOL/L (ref -2–3)
BASE EXCESS BLDV CALC-SCNC: -5.8 MMOL/L (ref -2–3)
BASOPHILS # BLD AUTO: 0.04 X10*3/UL (ref 0–0.1)
BASOPHILS NFR BLD AUTO: 0.3 %
BILIRUB SERPL-MCNC: 0.5 MG/DL (ref 0–1.2)
BODY TEMPERATURE: 37 DEGREES CELSIUS
BODY TEMPERATURE: 37 DEGREES CELSIUS
BUN SERPL-MCNC: 22 MG/DL (ref 6–23)
CA-I BLDV-SCNC: 1.14 MMOL/L (ref 1.1–1.33)
CA-I BLDV-SCNC: 1.23 MMOL/L (ref 1.1–1.33)
CALCIUM SERPL-MCNC: 9.4 MG/DL (ref 8.6–10.6)
CARDIAC TROPONIN I PNL SERPL HS: 25 NG/L (ref 0–53)
CHLORIDE BLDV-SCNC: 101 MMOL/L (ref 98–107)
CHLORIDE BLDV-SCNC: 103 MMOL/L (ref 98–107)
CHLORIDE SERPL-SCNC: 102 MMOL/L (ref 98–107)
CO2 SERPL-SCNC: 19 MMOL/L (ref 21–32)
CREAT SERPL-MCNC: 2.22 MG/DL (ref 0.5–1.3)
EGFRCR SERPLBLD CKD-EPI 2021: 37 ML/MIN/1.73M*2
EOSINOPHIL # BLD AUTO: 0.01 X10*3/UL (ref 0–0.7)
EOSINOPHIL NFR BLD AUTO: 0.1 %
ERYTHROCYTE [DISTWIDTH] IN BLOOD BY AUTOMATED COUNT: 13.2 % (ref 11.5–14.5)
ETHANOL SERPL-MCNC: <10 MG/DL
GLUCOSE BLDV-MCNC: 200 MG/DL (ref 74–99)
GLUCOSE BLDV-MCNC: 201 MG/DL (ref 74–99)
GLUCOSE SERPL-MCNC: 202 MG/DL (ref 74–99)
HCO3 BLDV-SCNC: 21.2 MMOL/L (ref 22–26)
HCO3 BLDV-SCNC: 21.9 MMOL/L (ref 22–26)
HCT VFR BLD AUTO: 44 % (ref 41–52)
HCT VFR BLD EST: 40 % (ref 41–52)
HCT VFR BLD EST: 46 % (ref 41–52)
HGB BLD-MCNC: 15 G/DL (ref 13.5–17.5)
HGB BLDV-MCNC: 13.3 G/DL (ref 13.5–17.5)
HGB BLDV-MCNC: 15.4 G/DL (ref 13.5–17.5)
IMM GRANULOCYTES # BLD AUTO: 0.07 X10*3/UL (ref 0–0.7)
IMM GRANULOCYTES NFR BLD AUTO: 0.5 % (ref 0–0.9)
INR PPP: 1.1 (ref 0.9–1.1)
LACTATE BLDV-SCNC: 3.9 MMOL/L (ref 0.4–2)
LACTATE BLDV-SCNC: 8.1 MMOL/L (ref 0.4–2)
LYMPHOCYTES # BLD AUTO: 5.02 X10*3/UL (ref 1.2–4.8)
LYMPHOCYTES NFR BLD AUTO: 37.9 %
MCH RBC QN AUTO: 31.7 PG (ref 26–34)
MCHC RBC AUTO-ENTMCNC: 34.1 G/DL (ref 32–36)
MCV RBC AUTO: 93 FL (ref 80–100)
MONOCYTES # BLD AUTO: 0.88 X10*3/UL (ref 0.1–1)
MONOCYTES NFR BLD AUTO: 6.6 %
NEUTROPHILS # BLD AUTO: 7.22 X10*3/UL (ref 1.2–7.7)
NEUTROPHILS NFR BLD AUTO: 54.6 %
NRBC BLD-RTO: 0 /100 WBCS (ref 0–0)
OXYHGB MFR BLDV: 9.2 % (ref 45–75)
OXYHGB MFR BLDV: 93.4 % (ref 45–75)
PCO2 BLDV: 51 MM HG (ref 41–51)
PCO2 BLDV: 82 MM HG (ref 41–51)
PH BLDV: 7.02 PH (ref 7.33–7.43)
PH BLDV: 7.24 PH (ref 7.33–7.43)
PLATELET # BLD AUTO: 174 X10*3/UL (ref 150–450)
PO2 BLDV: 18 MM HG (ref 35–45)
PO2 BLDV: 80 MM HG (ref 35–45)
POTASSIUM BLDV-SCNC: 3.8 MMOL/L (ref 3.5–5.3)
POTASSIUM BLDV-SCNC: 4.2 MMOL/L (ref 3.5–5.3)
POTASSIUM SERPL-SCNC: 3.7 MMOL/L (ref 3.5–5.3)
PROT SERPL-MCNC: 7.3 G/DL (ref 6.4–8.2)
PROTHROMBIN TIME: 12.2 SECONDS (ref 9.8–12.8)
RBC # BLD AUTO: 4.73 X10*6/UL (ref 4.5–5.9)
SAO2 % BLDV: 9 % (ref 45–75)
SAO2 % BLDV: 96 % (ref 45–75)
SODIUM BLDV-SCNC: 135 MMOL/L (ref 136–145)
SODIUM BLDV-SCNC: 138 MMOL/L (ref 136–145)
SODIUM SERPL-SCNC: 140 MMOL/L (ref 136–145)
WBC # BLD AUTO: 13.2 X10*3/UL (ref 4.4–11.3)

## 2025-01-05 PROCEDURE — G0390 TRAUMA RESPONS W/HOSP CRITI: HCPCS

## 2025-01-05 PROCEDURE — 70498 CT ANGIOGRAPHY NECK: CPT

## 2025-01-05 PROCEDURE — 82550 ASSAY OF CK (CPK): CPT

## 2025-01-05 PROCEDURE — 94002 VENT MGMT INPAT INIT DAY: CPT

## 2025-01-05 PROCEDURE — 72170 X-RAY EXAM OF PELVIS: CPT

## 2025-01-05 PROCEDURE — 2500000004 HC RX 250 GENERAL PHARMACY W/ HCPCS (ALT 636 FOR OP/ED): Mod: SE | Performed by: EMERGENCY MEDICINE

## 2025-01-05 PROCEDURE — 31500 INSERT EMERGENCY AIRWAY: CPT | Performed by: EMERGENCY MEDICINE

## 2025-01-05 PROCEDURE — 5A1945Z RESPIRATORY VENTILATION, 24-96 CONSECUTIVE HOURS: ICD-10-PCS | Performed by: EMERGENCY MEDICINE

## 2025-01-05 PROCEDURE — 80307 DRUG TEST PRSMV CHEM ANLYZR: CPT

## 2025-01-05 PROCEDURE — 94681 O2 UPTK CO2 OUTP % O2 XTRC: CPT

## 2025-01-05 PROCEDURE — 72170 X-RAY EXAM OF PELVIS: CPT | Performed by: RADIOLOGY

## 2025-01-05 PROCEDURE — 2550000001 HC RX 255 CONTRASTS: Mod: SE | Performed by: EMERGENCY MEDICINE

## 2025-01-05 PROCEDURE — 74177 CT ABD & PELVIS W/CONTRAST: CPT

## 2025-01-05 PROCEDURE — 72128 CT CHEST SPINE W/O DYE: CPT | Mod: RCN

## 2025-01-05 PROCEDURE — 72131 CT LUMBAR SPINE W/O DYE: CPT | Mod: RCN

## 2025-01-05 PROCEDURE — 85610 PROTHROMBIN TIME: CPT | Performed by: NURSE PRACTITIONER

## 2025-01-05 PROCEDURE — 94799 UNLISTED PULMONARY SVC/PX: CPT

## 2025-01-05 PROCEDURE — 71260 CT THORAX DX C+: CPT | Performed by: RADIOLOGY

## 2025-01-05 PROCEDURE — 96375 TX/PRO/DX INJ NEW DRUG ADDON: CPT

## 2025-01-05 PROCEDURE — 71045 X-RAY EXAM CHEST 1 VIEW: CPT

## 2025-01-05 PROCEDURE — 92950 HEART/LUNG RESUSCITATION CPR: CPT | Performed by: EMERGENCY MEDICINE

## 2025-01-05 PROCEDURE — 2500000004 HC RX 250 GENERAL PHARMACY W/ HCPCS (ALT 636 FOR OP/ED): Mod: SE

## 2025-01-05 PROCEDURE — 71045 X-RAY EXAM CHEST 1 VIEW: CPT | Performed by: RADIOLOGY

## 2025-01-05 PROCEDURE — 72125 CT NECK SPINE W/O DYE: CPT

## 2025-01-05 PROCEDURE — 74177 CT ABD & PELVIS W/CONTRAST: CPT | Performed by: RADIOLOGY

## 2025-01-05 PROCEDURE — 83605 ASSAY OF LACTIC ACID: CPT | Performed by: NURSE PRACTITIONER

## 2025-01-05 PROCEDURE — 3E033XZ INTRODUCTION OF VASOPRESSOR INTO PERIPHERAL VEIN, PERCUTANEOUS APPROACH: ICD-10-PCS | Performed by: EMERGENCY MEDICINE

## 2025-01-05 PROCEDURE — 82435 ASSAY OF BLOOD CHLORIDE: CPT | Performed by: NURSE PRACTITIONER

## 2025-01-05 PROCEDURE — 0BH17EZ INSERTION OF ENDOTRACHEAL AIRWAY INTO TRACHEA, VIA NATURAL OR ARTIFICIAL OPENING: ICD-10-PCS | Performed by: EMERGENCY MEDICINE

## 2025-01-05 PROCEDURE — 96374 THER/PROPH/DIAG INJ IV PUSH: CPT

## 2025-01-05 PROCEDURE — 82435 ASSAY OF BLOOD CHLORIDE: CPT

## 2025-01-05 PROCEDURE — 72128 CT CHEST SPINE W/O DYE: CPT | Performed by: RADIOLOGY

## 2025-01-05 PROCEDURE — 84132 ASSAY OF SERUM POTASSIUM: CPT

## 2025-01-05 PROCEDURE — 99292 CRITICAL CARE ADDL 30 MIN: CPT | Performed by: EMERGENCY MEDICINE

## 2025-01-05 PROCEDURE — 70498 CT ANGIOGRAPHY NECK: CPT | Performed by: RADIOLOGY

## 2025-01-05 PROCEDURE — 70450 CT HEAD/BRAIN W/O DYE: CPT

## 2025-01-05 PROCEDURE — 70450 CT HEAD/BRAIN W/O DYE: CPT | Performed by: RADIOLOGY

## 2025-01-05 PROCEDURE — 31500 INSERT EMERGENCY AIRWAY: CPT

## 2025-01-05 PROCEDURE — 2500000005 HC RX 250 GENERAL PHARMACY W/O HCPCS: Mod: SE

## 2025-01-05 PROCEDURE — 72125 CT NECK SPINE W/O DYE: CPT | Performed by: RADIOLOGY

## 2025-01-05 PROCEDURE — 85025 COMPLETE CBC W/AUTO DIFF WBC: CPT | Performed by: NURSE PRACTITIONER

## 2025-01-05 PROCEDURE — 72131 CT LUMBAR SPINE W/O DYE: CPT | Performed by: RADIOLOGY

## 2025-01-05 PROCEDURE — 84484 ASSAY OF TROPONIN QUANT: CPT

## 2025-01-05 PROCEDURE — 99291 CRITICAL CARE FIRST HOUR: CPT | Performed by: EMERGENCY MEDICINE

## 2025-01-05 PROCEDURE — 86901 BLOOD TYPING SEROLOGIC RH(D): CPT | Performed by: NURSE PRACTITIONER

## 2025-01-05 PROCEDURE — 70496 CT ANGIOGRAPHY HEAD: CPT | Performed by: RADIOLOGY

## 2025-01-05 PROCEDURE — 36415 COLL VENOUS BLD VENIPUNCTURE: CPT

## 2025-01-05 PROCEDURE — 82077 ASSAY SPEC XCP UR&BREATH IA: CPT | Performed by: NURSE PRACTITIONER

## 2025-01-05 PROCEDURE — 92950 HEART/LUNG RESUSCITATION CPR: CPT

## 2025-01-05 PROCEDURE — 12011 RPR F/E/E/N/L/M 2.5 CM/<: CPT | Performed by: EMERGENCY MEDICINE

## 2025-01-05 PROCEDURE — 84132 ASSAY OF SERUM POTASSIUM: CPT | Performed by: NURSE PRACTITIONER

## 2025-01-05 PROCEDURE — 99222 1ST HOSP IP/OBS MODERATE 55: CPT | Performed by: SURGERY

## 2025-01-05 PROCEDURE — 84295 ASSAY OF SERUM SODIUM: CPT

## 2025-01-05 RX ORDER — KETAMINE HYDROCHLORIDE 10 MG/ML
INJECTION, SOLUTION INTRAMUSCULAR; INTRAVENOUS CODE/TRAUMA/SEDATION MEDICATION
Status: COMPLETED | OUTPATIENT
Start: 2025-01-05 | End: 2025-01-05

## 2025-01-05 RX ORDER — PROPOFOL 10 MG/ML
0-50 INJECTION, EMULSION INTRAVENOUS CONTINUOUS
Status: DISCONTINUED | OUTPATIENT
Start: 2025-01-05 | End: 2025-01-07

## 2025-01-05 RX ORDER — EPINEPHRINE 0.1 MG/ML
INJECTION INTRACARDIAC; INTRAVENOUS CODE/TRAUMA/SEDATION MEDICATION
Status: COMPLETED | OUTPATIENT
Start: 2025-01-05 | End: 2025-01-05

## 2025-01-05 RX ORDER — ROCURONIUM BROMIDE 10 MG/ML
INJECTION, SOLUTION INTRAVENOUS
Status: DISPENSED
Start: 2025-01-05 | End: 2025-01-06

## 2025-01-05 RX ORDER — FENTANYL CITRATE-0.9 % NACL/PF 10 MCG/ML
PLASTIC BAG, INJECTION (ML) INTRAVENOUS
Status: COMPLETED
Start: 2025-01-05 | End: 2025-01-05

## 2025-01-05 RX ORDER — NOREPINEPHRINE BITARTRATE/D5W 8 MG/250ML
PLASTIC BAG, INJECTION (ML) INTRAVENOUS
Status: COMPLETED
Start: 2025-01-05 | End: 2025-01-05

## 2025-01-05 RX ORDER — NALOXONE HYDROCHLORIDE 1 MG/ML
INJECTION INTRAMUSCULAR; INTRAVENOUS; SUBCUTANEOUS
Status: DISPENSED
Start: 2025-01-05 | End: 2025-01-06

## 2025-01-05 RX ORDER — FENTANYL CITRATE 50 UG/ML
INJECTION, SOLUTION INTRAMUSCULAR; INTRAVENOUS
Status: DISPENSED
Start: 2025-01-05 | End: 2025-01-06

## 2025-01-05 RX ORDER — NALOXONE HYDROCHLORIDE 0.4 MG/ML
INJECTION, SOLUTION INTRAMUSCULAR; INTRAVENOUS; SUBCUTANEOUS CODE/TRAUMA/SEDATION MEDICATION
Status: COMPLETED | OUTPATIENT
Start: 2025-01-05 | End: 2025-01-05

## 2025-01-05 RX ORDER — NOREPINEPHRINE BITARTRATE/D5W 8 MG/250ML
.01-.2 PLASTIC BAG, INJECTION (ML) INTRAVENOUS CONTINUOUS
Status: DISCONTINUED | OUTPATIENT
Start: 2025-01-05 | End: 2025-01-07

## 2025-01-05 RX ORDER — FENTANYL CITRATE-0.9 % NACL/PF 10 MCG/ML
0-300 PLASTIC BAG, INJECTION (ML) INTRAVENOUS CONTINUOUS
Status: DISCONTINUED | OUTPATIENT
Start: 2025-01-05 | End: 2025-01-07

## 2025-01-05 RX ORDER — FENTANYL CITRATE 50 UG/ML
INJECTION, SOLUTION INTRAMUSCULAR; INTRAVENOUS CODE/TRAUMA/SEDATION MEDICATION
Status: COMPLETED | OUTPATIENT
Start: 2025-01-05 | End: 2025-01-05

## 2025-01-05 RX ORDER — NALOXONE HYDROCHLORIDE 1 MG/ML
INJECTION INTRAMUSCULAR; INTRAVENOUS; SUBCUTANEOUS CODE/TRAUMA/SEDATION MEDICATION
Status: COMPLETED | OUTPATIENT
Start: 2025-01-05 | End: 2025-01-05

## 2025-01-05 RX ORDER — 3% SODIUM CHLORIDE 3 G/100ML
INJECTION, SOLUTION INTRAVENOUS
Status: DISPENSED
Start: 2025-01-05 | End: 2025-01-06

## 2025-01-05 RX ORDER — PROPOFOL 10 MG/ML
INJECTION, EMULSION INTRAVENOUS
Status: COMPLETED
Start: 2025-01-05 | End: 2025-01-05

## 2025-01-05 RX ADMIN — Medication 50 MCG/HR: at 22:48

## 2025-01-05 RX ADMIN — NOREPINEPHRINE BITARTRATE 0.03 MCG/KG/MIN: 8 INJECTION, SOLUTION INTRAVENOUS at 22:45

## 2025-01-05 RX ADMIN — IOHEXOL 80 ML: 350 INJECTION, SOLUTION INTRAVENOUS at 22:43

## 2025-01-05 RX ADMIN — FENTANYL CITRATE 100 MCG: 50 INJECTION, SOLUTION INTRAMUSCULAR; INTRAVENOUS at 22:18

## 2025-01-05 RX ADMIN — IOHEXOL 80 ML: 350 INJECTION, SOLUTION INTRAVENOUS at 22:42

## 2025-01-05 RX ADMIN — KETAMINE HYDROCHLORIDE 90 MG: 10 INJECTION, SOLUTION INTRAMUSCULAR; INTRAVENOUS at 21:49

## 2025-01-05 RX ADMIN — EPINEPHRINE 1 MG: 0.1 INJECTION INTRAVENOUS at 21:38

## 2025-01-05 RX ADMIN — NALOXONE HYDROCHLORIDE 4 MG: 1 INJECTION PARENTERAL at 21:37

## 2025-01-05 RX ADMIN — Medication 100 PERCENT: at 21:49

## 2025-01-05 RX ADMIN — PROPOFOL 20 MCG/KG/MIN: 10 INJECTION, EMULSION INTRAVENOUS at 22:01

## 2025-01-05 RX ADMIN — Medication 0.03 MCG/KG/MIN: at 22:45

## 2025-01-06 ENCOUNTER — APPOINTMENT (OUTPATIENT)
Dept: RADIOLOGY | Facility: HOSPITAL | Age: 45
End: 2025-01-06
Payer: COMMERCIAL

## 2025-01-06 ENCOUNTER — APPOINTMENT (OUTPATIENT)
Dept: CARDIOLOGY | Facility: HOSPITAL | Age: 45
End: 2025-01-06
Payer: COMMERCIAL

## 2025-01-06 PROBLEM — I46.9 CARDIAC ARREST: Status: ACTIVE | Noted: 2025-01-06

## 2025-01-06 LAB
ABO GROUP (TYPE) IN BLOOD: NORMAL
ALBUMIN SERPL BCP-MCNC: 3.4 G/DL (ref 3.4–5)
ALBUMIN SERPL BCP-MCNC: 3.9 G/DL (ref 3.4–5)
AMPHETAMINES UR QL SCN: ABNORMAL
ANION GAP BLDV CALCULATED.4IONS-SCNC: 10 MMOL/L (ref 10–25)
ANION GAP BLDV CALCULATED.4IONS-SCNC: 12 MMOL/L (ref 10–25)
ANION GAP BLDV CALCULATED.4IONS-SCNC: 12 MMOL/L (ref 10–25)
ANION GAP SERPL CALC-SCNC: 10 MMOL/L (ref 10–20)
ANION GAP SERPL CALC-SCNC: 13 MMOL/L (ref 10–20)
ANTIBODY SCREEN: NORMAL
AORTIC VALVE PEAK VELOCITY: 1.07 M/S
APAP SERPL-MCNC: <10 UG/ML
APPEARANCE UR: CLEAR
AV PEAK GRADIENT: 5 MMHG
AVA (PEAK VEL): 3.14 CM2
BARBITURATES UR QL SCN: ABNORMAL
BASE EXCESS BLDV CALC-SCNC: -0.2 MMOL/L (ref -2–3)
BASE EXCESS BLDV CALC-SCNC: -0.3 MMOL/L (ref -2–3)
BASE EXCESS BLDV CALC-SCNC: 0 MMOL/L (ref -2–3)
BASOPHILS # BLD AUTO: 0.02 X10*3/UL (ref 0–0.1)
BASOPHILS NFR BLD AUTO: 0.2 %
BENZODIAZ UR QL SCN: ABNORMAL
BILIRUB UR STRIP.AUTO-MCNC: NEGATIVE MG/DL
BODY TEMPERATURE: 37 DEGREES CELSIUS
BUN SERPL-MCNC: 22 MG/DL (ref 6–23)
BUN SERPL-MCNC: 22 MG/DL (ref 6–23)
BZE UR QL SCN: ABNORMAL
CA-I BLDV-SCNC: 1.2 MMOL/L (ref 1.1–1.33)
CA-I BLDV-SCNC: 1.21 MMOL/L (ref 1.1–1.33)
CA-I BLDV-SCNC: 1.21 MMOL/L (ref 1.1–1.33)
CALCIUM SERPL-MCNC: 8.8 MG/DL (ref 8.6–10.6)
CALCIUM SERPL-MCNC: 9.2 MG/DL (ref 8.6–10.6)
CANNABINOIDS UR QL SCN: ABNORMAL
CHLORIDE BLDV-SCNC: 104 MMOL/L (ref 98–107)
CHLORIDE BLDV-SCNC: 105 MMOL/L (ref 98–107)
CHLORIDE BLDV-SCNC: 105 MMOL/L (ref 98–107)
CHLORIDE SERPL-SCNC: 105 MMOL/L (ref 98–107)
CHLORIDE SERPL-SCNC: 107 MMOL/L (ref 98–107)
CHLORIDE UR-SCNC: 61 MMOL/L
CHLORIDE/CREATININE (MMOL/G) IN URINE: 35 MMOL/G CREAT (ref 23–275)
CK SERPL-CCNC: 2825 U/L (ref 0–325)
CK SERPL-CCNC: 2836 U/L (ref 0–325)
CK SERPL-CCNC: 3303 U/L (ref 0–325)
CO2 SERPL-SCNC: 27 MMOL/L (ref 21–32)
CO2 SERPL-SCNC: 27 MMOL/L (ref 21–32)
COLOR UR: ABNORMAL
CREAT SERPL-MCNC: 1.4 MG/DL (ref 0.5–1.3)
CREAT SERPL-MCNC: 1.62 MG/DL (ref 0.5–1.3)
CREAT UR-MCNC: 172.3 MG/DL (ref 20–370)
EGFRCR SERPLBLD CKD-EPI 2021: 53 ML/MIN/1.73M*2
EGFRCR SERPLBLD CKD-EPI 2021: 64 ML/MIN/1.73M*2
EJECTION FRACTION APICAL 4 CHAMBER: 43.5
EJECTION FRACTION: 43 %
EOSINOPHIL # BLD AUTO: 0 X10*3/UL (ref 0–0.7)
EOSINOPHIL NFR BLD AUTO: 0 %
ERYTHROCYTE [DISTWIDTH] IN BLOOD BY AUTOMATED COUNT: 13.2 % (ref 11.5–14.5)
ETHANOL SERPL-MCNC: <10 MG/DL
FENTANYL+NORFENTANYL UR QL SCN: ABNORMAL
GLUCOSE BLD MANUAL STRIP-MCNC: 132 MG/DL (ref 74–99)
GLUCOSE BLDV-MCNC: 125 MG/DL (ref 74–99)
GLUCOSE BLDV-MCNC: 80 MG/DL (ref 74–99)
GLUCOSE BLDV-MCNC: 98 MG/DL (ref 74–99)
GLUCOSE SERPL-MCNC: 91 MG/DL (ref 74–99)
GLUCOSE SERPL-MCNC: 98 MG/DL (ref 74–99)
GLUCOSE UR STRIP.AUTO-MCNC: NORMAL MG/DL
HCO3 BLDV-SCNC: 25.4 MMOL/L (ref 22–26)
HCO3 BLDV-SCNC: 25.4 MMOL/L (ref 22–26)
HCO3 BLDV-SCNC: 26.4 MMOL/L (ref 22–26)
HCT VFR BLD AUTO: 38.3 % (ref 41–52)
HCT VFR BLD EST: 43 % (ref 41–52)
HGB BLD-MCNC: 13.9 G/DL (ref 13.5–17.5)
HGB BLDV-MCNC: 14.3 G/DL (ref 13.5–17.5)
HGB BLDV-MCNC: 14.4 G/DL (ref 13.5–17.5)
HGB BLDV-MCNC: 14.4 G/DL (ref 13.5–17.5)
HOLD SPECIMEN: NORMAL
IMM GRANULOCYTES # BLD AUTO: 0.04 X10*3/UL (ref 0–0.7)
IMM GRANULOCYTES NFR BLD AUTO: 0.4 % (ref 0–0.9)
INHALED O2 CONCENTRATION: 30 %
INHALED O2 CONCENTRATION: 30 %
KETONES UR STRIP.AUTO-MCNC: NEGATIVE MG/DL
LACTATE BLDV-SCNC: 1.2 MMOL/L (ref 0.4–2)
LACTATE BLDV-SCNC: 1.2 MMOL/L (ref 0.4–2)
LACTATE BLDV-SCNC: 1.7 MMOL/L (ref 0.4–2)
LACTATE SERPL-SCNC: 1.6 MMOL/L (ref 0.4–2)
LACTATE SERPL-SCNC: 11.2 MMOL/L (ref 0.4–2)
LEFT VENTRICLE INTERNAL DIMENSION DIASTOLE: 5.2 CM (ref 3.5–6)
LEFT VENTRICULAR OUTFLOW TRACT DIAMETER: 2.1 CM
LEUKOCYTE ESTERASE UR QL STRIP.AUTO: NEGATIVE
LYMPHOCYTES # BLD AUTO: 2 X10*3/UL (ref 1.2–4.8)
LYMPHOCYTES NFR BLD AUTO: 19.2 %
MAGNESIUM SERPL-MCNC: 2.28 MG/DL (ref 1.6–2.4)
MCH RBC QN AUTO: 31.8 PG (ref 26–34)
MCHC RBC AUTO-ENTMCNC: 36.3 G/DL (ref 32–36)
MCV RBC AUTO: 88 FL (ref 80–100)
METHADONE UR QL SCN: ABNORMAL
MITRAL VALVE E/A RATIO: 1.06
MONOCYTES # BLD AUTO: 0.71 X10*3/UL (ref 0.1–1)
MONOCYTES NFR BLD AUTO: 6.8 %
MUCOUS THREADS #/AREA URNS AUTO: ABNORMAL /LPF
NEUTROPHILS # BLD AUTO: 7.67 X10*3/UL (ref 1.2–7.7)
NEUTROPHILS NFR BLD AUTO: 73.4 %
NITRITE UR QL STRIP.AUTO: NEGATIVE
NRBC BLD-RTO: 0 /100 WBCS (ref 0–0)
OPIATES UR QL SCN: ABNORMAL
OXYCODONE+OXYMORPHONE UR QL SCN: ABNORMAL
OXYHGB MFR BLDV: 65.3 % (ref 45–75)
OXYHGB MFR BLDV: 87 % (ref 45–75)
OXYHGB MFR BLDV: 87.1 % (ref 45–75)
PCO2 BLDV: 43 MM HG (ref 41–51)
PCO2 BLDV: 44 MM HG (ref 41–51)
PCO2 BLDV: 50 MM HG (ref 41–51)
PCP UR QL SCN: ABNORMAL
PH BLDV: 7.33 PH (ref 7.33–7.43)
PH BLDV: 7.37 PH (ref 7.33–7.43)
PH BLDV: 7.38 PH (ref 7.33–7.43)
PH UR STRIP.AUTO: 6 [PH]
PHOSPHATE SERPL-MCNC: 2.4 MG/DL (ref 2.5–4.9)
PHOSPHATE SERPL-MCNC: 4.1 MG/DL (ref 2.5–4.9)
PLATELET # BLD AUTO: 173 X10*3/UL (ref 150–450)
PO2 BLDV: 42 MM HG (ref 35–45)
PO2 BLDV: 58 MM HG (ref 35–45)
PO2 BLDV: 60 MM HG (ref 35–45)
POTASSIUM BLDV-SCNC: 4.3 MMOL/L (ref 3.5–5.3)
POTASSIUM BLDV-SCNC: 4.6 MMOL/L (ref 3.5–5.3)
POTASSIUM BLDV-SCNC: 4.9 MMOL/L (ref 3.5–5.3)
POTASSIUM SERPL-SCNC: 4.1 MMOL/L (ref 3.5–5.3)
POTASSIUM SERPL-SCNC: 4.5 MMOL/L (ref 3.5–5.3)
POTASSIUM UR-SCNC: 98 MMOL/L
POTASSIUM/CREAT UR-RTO: 57 MMOL/G CREAT
PROT UR STRIP.AUTO-MCNC: ABNORMAL MG/DL
RBC # BLD AUTO: 4.37 X10*6/UL (ref 4.5–5.9)
RBC # UR STRIP.AUTO: ABNORMAL /UL
RBC #/AREA URNS AUTO: ABNORMAL /HPF
RH FACTOR (ANTIGEN D): NORMAL
RIGHT VENTRICLE FREE WALL PEAK S': 10.4 CM/S
RIGHT VENTRICLE PEAK SYSTOLIC PRESSURE: 35.3 MMHG
SALICYLATES SERPL-MCNC: <3 MG/DL
SAO2 % BLDV: 67 % (ref 45–75)
SAO2 % BLDV: 89 % (ref 45–75)
SAO2 % BLDV: 89 % (ref 45–75)
SODIUM BLDV-SCNC: 136 MMOL/L (ref 136–145)
SODIUM BLDV-SCNC: 137 MMOL/L (ref 136–145)
SODIUM BLDV-SCNC: 138 MMOL/L (ref 136–145)
SODIUM SERPL-SCNC: 140 MMOL/L (ref 136–145)
SODIUM SERPL-SCNC: 140 MMOL/L (ref 136–145)
SODIUM UR-SCNC: 26 MMOL/L
SODIUM/CREAT UR-RTO: 15 MMOL/G CREAT
SP GR UR STRIP.AUTO: >1.05
TRICUSPID ANNULAR PLANE SYSTOLIC EXCURSION: 2 CM
UROBILINOGEN UR STRIP.AUTO-MCNC: NORMAL MG/DL
WBC # BLD AUTO: 10.4 X10*3/UL (ref 4.4–11.3)
WBC #/AREA URNS AUTO: ABNORMAL /HPF

## 2025-01-06 PROCEDURE — 94003 VENT MGMT INPAT SUBQ DAY: CPT

## 2025-01-06 PROCEDURE — 83605 ASSAY OF LACTIC ACID: CPT

## 2025-01-06 PROCEDURE — 73551 X-RAY EXAM OF FEMUR 1: CPT | Mod: BILATERAL PROCEDURE | Performed by: RADIOLOGY

## 2025-01-06 PROCEDURE — 0CQ1XZZ REPAIR LOWER LIP, EXTERNAL APPROACH: ICD-10-PCS | Performed by: SURGERY

## 2025-01-06 PROCEDURE — 73620 X-RAY EXAM OF FOOT: CPT | Mod: 50

## 2025-01-06 PROCEDURE — 84132 ASSAY OF SERUM POTASSIUM: CPT

## 2025-01-06 PROCEDURE — 76770 US EXAM ABDO BACK WALL COMP: CPT

## 2025-01-06 PROCEDURE — 2500000004 HC RX 250 GENERAL PHARMACY W/ HCPCS (ALT 636 FOR OP/ED): Mod: SE

## 2025-01-06 PROCEDURE — 93005 ELECTROCARDIOGRAM TRACING: CPT

## 2025-01-06 PROCEDURE — 82435 ASSAY OF BLOOD CHLORIDE: CPT

## 2025-01-06 PROCEDURE — 73090 X-RAY EXAM OF FOREARM: CPT | Mod: 50

## 2025-01-06 PROCEDURE — 73090 X-RAY EXAM OF FOREARM: CPT | Mod: BILATERAL PROCEDURE | Performed by: RADIOLOGY

## 2025-01-06 PROCEDURE — 73590 X-RAY EXAM OF LOWER LEG: CPT | Mod: 50

## 2025-01-06 PROCEDURE — 73060 X-RAY EXAM OF HUMERUS: CPT | Mod: 50

## 2025-01-06 PROCEDURE — 82550 ASSAY OF CK (CPK): CPT

## 2025-01-06 PROCEDURE — C8929 TTE W OR WO FOL WCON,DOPPLER: HCPCS

## 2025-01-06 PROCEDURE — 93010 ELECTROCARDIOGRAM REPORT: CPT | Performed by: INTERNAL MEDICINE

## 2025-01-06 PROCEDURE — 73551 X-RAY EXAM OF FEMUR 1: CPT | Mod: 50

## 2025-01-06 PROCEDURE — 73060 X-RAY EXAM OF HUMERUS: CPT | Mod: BILATERAL PROCEDURE | Performed by: RADIOLOGY

## 2025-01-06 PROCEDURE — 73590 X-RAY EXAM OF LOWER LEG: CPT | Mod: BILATERAL PROCEDURE | Performed by: RADIOLOGY

## 2025-01-06 PROCEDURE — 94799 UNLISTED PULMONARY SVC/PX: CPT

## 2025-01-06 PROCEDURE — 73620 X-RAY EXAM OF FOOT: CPT | Mod: BILATERAL PROCEDURE | Performed by: RADIOLOGY

## 2025-01-06 PROCEDURE — 08QNXZZ REPAIR RIGHT UPPER EYELID, EXTERNAL APPROACH: ICD-10-PCS | Performed by: SURGERY

## 2025-01-06 PROCEDURE — 67930 REPAIR EYELID WOUND: CPT | Mod: E3

## 2025-01-06 PROCEDURE — 2500000004 HC RX 250 GENERAL PHARMACY W/ HCPCS (ALT 636 FOR OP/ED): Mod: SE | Performed by: EMERGENCY MEDICINE

## 2025-01-06 PROCEDURE — 82947 ASSAY GLUCOSE BLOOD QUANT: CPT

## 2025-01-06 PROCEDURE — 36415 COLL VENOUS BLD VENIPUNCTURE: CPT

## 2025-01-06 PROCEDURE — 81001 URINALYSIS AUTO W/SCOPE: CPT

## 2025-01-06 PROCEDURE — 85025 COMPLETE CBC W/AUTO DIFF WBC: CPT

## 2025-01-06 PROCEDURE — 5A12012 PERFORMANCE OF CARDIAC OUTPUT, SINGLE, MANUAL: ICD-10-PCS | Performed by: SURGERY

## 2025-01-06 PROCEDURE — 83735 ASSAY OF MAGNESIUM: CPT

## 2025-01-06 PROCEDURE — 99291 CRITICAL CARE FIRST HOUR: CPT

## 2025-01-06 PROCEDURE — 82436 ASSAY OF URINE CHLORIDE: CPT

## 2025-01-06 PROCEDURE — 93306 TTE W/DOPPLER COMPLETE: CPT | Performed by: INTERNAL MEDICINE

## 2025-01-06 PROCEDURE — 73120 X-RAY EXAM OF HAND: CPT | Mod: BILATERAL PROCEDURE | Performed by: RADIOLOGY

## 2025-01-06 PROCEDURE — 2020000001 HC ICU ROOM DAILY

## 2025-01-06 PROCEDURE — 76770 US EXAM ABDO BACK WALL COMP: CPT | Performed by: RADIOLOGY

## 2025-01-06 PROCEDURE — 80143 DRUG ASSAY ACETAMINOPHEN: CPT

## 2025-01-06 PROCEDURE — 73120 X-RAY EXAM OF HAND: CPT | Mod: 50

## 2025-01-06 RX ORDER — ROSUVASTATIN CALCIUM 20 MG/1
1 TABLET, COATED ORAL NIGHTLY
COMMUNITY
Start: 2025-01-02

## 2025-01-06 RX ORDER — TRAZODONE HYDROCHLORIDE 50 MG/1
100 TABLET ORAL NIGHTLY
Status: DISCONTINUED | OUTPATIENT
Start: 2025-01-06 | End: 2025-01-08

## 2025-01-06 RX ORDER — DEXMEDETOMIDINE HYDROCHLORIDE 4 UG/ML
.2-1.5 INJECTION, SOLUTION INTRAVENOUS CONTINUOUS
Status: DISCONTINUED | OUTPATIENT
Start: 2025-01-06 | End: 2025-01-07

## 2025-01-06 RX ORDER — SODIUM CHLORIDE, SODIUM LACTATE, POTASSIUM CHLORIDE, CALCIUM CHLORIDE 600; 310; 30; 20 MG/100ML; MG/100ML; MG/100ML; MG/100ML
150 INJECTION, SOLUTION INTRAVENOUS CONTINUOUS
Status: ACTIVE | OUTPATIENT
Start: 2025-01-06 | End: 2025-01-07

## 2025-01-06 RX ORDER — ACETAMINOPHEN 500 MG/1
500 CAPSULE, LIQUID FILLED ORAL EVERY 8 HOURS PRN
COMMUNITY
Start: 2024-11-21 | End: 2025-01-10 | Stop reason: HOSPADM

## 2025-01-06 RX ORDER — SODIUM CHLORIDE, SODIUM LACTATE, POTASSIUM CHLORIDE, CALCIUM CHLORIDE 600; 310; 30; 20 MG/100ML; MG/100ML; MG/100ML; MG/100ML
75 INJECTION, SOLUTION INTRAVENOUS CONTINUOUS
Status: DISCONTINUED | OUTPATIENT
Start: 2025-01-06 | End: 2025-01-06

## 2025-01-06 RX ORDER — ESOMEPRAZOLE MAGNESIUM 40 MG/1
40 GRANULE, DELAYED RELEASE ORAL
Status: DISCONTINUED | OUTPATIENT
Start: 2025-01-07 | End: 2025-01-10 | Stop reason: HOSPADM

## 2025-01-06 RX ORDER — BENZTROPINE MESYLATE 2 MG/1
2 TABLET ORAL 2 TIMES DAILY
Status: DISCONTINUED | OUTPATIENT
Start: 2025-01-06 | End: 2025-01-10 | Stop reason: HOSPADM

## 2025-01-06 RX ORDER — ROSUVASTATIN CALCIUM 20 MG/1
20 TABLET, COATED ORAL NIGHTLY
Status: DISCONTINUED | OUTPATIENT
Start: 2025-01-06 | End: 2025-01-10 | Stop reason: HOSPADM

## 2025-01-06 RX ORDER — PANTOPRAZOLE SODIUM 40 MG/10ML
40 INJECTION, POWDER, LYOPHILIZED, FOR SOLUTION INTRAVENOUS
Status: DISCONTINUED | OUTPATIENT
Start: 2025-01-07 | End: 2025-01-10 | Stop reason: HOSPADM

## 2025-01-06 RX ORDER — ALBUTEROL SULFATE 90 UG/1
1 INHALANT RESPIRATORY (INHALATION) EVERY 4 HOURS PRN
COMMUNITY
Start: 2024-12-26

## 2025-01-06 RX ORDER — FENTANYL CITRATE-0.9 % NACL/PF 10 MCG/ML
PLASTIC BAG, INJECTION (ML) INTRAVENOUS
Status: COMPLETED
Start: 2025-01-06 | End: 2025-01-06

## 2025-01-06 RX ORDER — BENZTROPINE MESYLATE 2 MG/1
2 TABLET ORAL 2 TIMES DAILY
COMMUNITY
Start: 2024-03-12

## 2025-01-06 RX ORDER — PANTOPRAZOLE SODIUM 40 MG/1
40 TABLET, DELAYED RELEASE ORAL
Status: DISCONTINUED | OUTPATIENT
Start: 2025-01-07 | End: 2025-01-10 | Stop reason: HOSPADM

## 2025-01-06 RX ORDER — OLANZAPINE 15 MG/1
2 TABLET ORAL NIGHTLY
COMMUNITY
Start: 2013-07-11

## 2025-01-06 RX ORDER — VANCOMYCIN HYDROCHLORIDE 1 G/20ML
INJECTION, POWDER, LYOPHILIZED, FOR SOLUTION INTRAVENOUS DAILY PRN
Status: DISCONTINUED | OUTPATIENT
Start: 2025-01-06 | End: 2025-01-06

## 2025-01-06 RX ORDER — FLUOXETINE HYDROCHLORIDE 20 MG/1
20 CAPSULE ORAL DAILY
Status: DISCONTINUED | OUTPATIENT
Start: 2025-01-06 | End: 2025-01-10 | Stop reason: HOSPADM

## 2025-01-06 RX ORDER — FLUOXETINE HYDROCHLORIDE 20 MG/1
20 CAPSULE ORAL DAILY
COMMUNITY
Start: 2020-05-18

## 2025-01-06 RX ORDER — ENOXAPARIN SODIUM 100 MG/ML
40 INJECTION SUBCUTANEOUS EVERY 24 HOURS
Status: DISCONTINUED | OUTPATIENT
Start: 2025-01-06 | End: 2025-01-10 | Stop reason: HOSPADM

## 2025-01-06 RX ORDER — TRAZODONE HYDROCHLORIDE 100 MG/1
1 TABLET ORAL NIGHTLY
COMMUNITY
Start: 2024-12-16 | End: 2025-01-10 | Stop reason: HOSPADM

## 2025-01-06 RX ADMIN — Medication 50 MCG/HR: at 11:56

## 2025-01-06 RX ADMIN — DEXMEDETOMIDINE HYDROCHLORIDE 0.4 MCG/KG/HR: 400 INJECTION INTRAVENOUS at 18:21

## 2025-01-06 RX ADMIN — SODIUM CHLORIDE, POTASSIUM CHLORIDE, SODIUM LACTATE AND CALCIUM CHLORIDE 1000 ML: 600; 310; 30; 20 INJECTION, SOLUTION INTRAVENOUS at 04:36

## 2025-01-06 RX ADMIN — PROPOFOL 30 MCG/KG/MIN: 10 INJECTION, EMULSION INTRAVENOUS at 01:43

## 2025-01-06 RX ADMIN — Medication 100 MCG/HR: at 23:20

## 2025-01-06 RX ADMIN — SODIUM CHLORIDE, POTASSIUM CHLORIDE, SODIUM LACTATE AND CALCIUM CHLORIDE 75 ML/HR: 600; 310; 30; 20 INJECTION, SOLUTION INTRAVENOUS at 10:24

## 2025-01-06 RX ADMIN — SODIUM CHLORIDE, POTASSIUM CHLORIDE, SODIUM LACTATE AND CALCIUM CHLORIDE 150 ML/HR: 600; 310; 30; 20 INJECTION, SOLUTION INTRAVENOUS at 18:23

## 2025-01-06 RX ADMIN — DEXMEDETOMIDINE HYDROCHLORIDE 0.2 MCG/KG/HR: 400 INJECTION INTRAVENOUS at 09:01

## 2025-01-06 RX ADMIN — DEXMEDETOMIDINE HYDROCHLORIDE 0.4 MCG/KG/HR: 400 INJECTION INTRAVENOUS at 23:20

## 2025-01-06 RX ADMIN — PERFLUTREN 10 ML OF DILUTION: 6.52 INJECTION, SUSPENSION INTRAVENOUS at 08:57

## 2025-01-06 RX ADMIN — PROPOFOL 25 MCG/KG/MIN: 10 INJECTION, EMULSION INTRAVENOUS at 08:00

## 2025-01-06 RX ADMIN — ENOXAPARIN SODIUM 40 MG: 100 INJECTION SUBCUTANEOUS at 08:00

## 2025-01-06 SDOH — HEALTH STABILITY: PHYSICAL HEALTH
HOW OFTEN DO YOU NEED TO HAVE SOMEONE HELP YOU WHEN YOU READ INSTRUCTIONS, PAMPHLETS, OR OTHER WRITTEN MATERIAL FROM YOUR DOCTOR OR PHARMACY?: PATIENT UNABLE TO RESPOND

## 2025-01-06 SDOH — SOCIAL STABILITY: SOCIAL INSECURITY: ARE THERE ANY APPARENT SIGNS OF INJURIES/BEHAVIORS THAT COULD BE RELATED TO ABUSE/NEGLECT?: UNABLE TO ASSESS

## 2025-01-06 SDOH — SOCIAL STABILITY: SOCIAL INSECURITY: HAVE YOU HAD THOUGHTS OF HARMING ANYONE ELSE?: UNABLE TO ASSESS

## 2025-01-06 SDOH — SOCIAL STABILITY: SOCIAL INSECURITY: ABUSE: ADULT

## 2025-01-06 SDOH — SOCIAL STABILITY: SOCIAL INSECURITY: HAVE YOU HAD ANY THOUGHTS OF HARMING ANYONE ELSE?: UNABLE TO ASSESS

## 2025-01-06 SDOH — SOCIAL STABILITY: SOCIAL INSECURITY: DO YOU FEEL UNSAFE GOING BACK TO THE PLACE WHERE YOU ARE LIVING?: UNABLE TO ASSESS

## 2025-01-06 SDOH — SOCIAL STABILITY: SOCIAL INSECURITY: ARE YOU OR HAVE YOU BEEN THREATENED OR ABUSED PHYSICALLY, EMOTIONALLY, OR SEXUALLY BY ANYONE?: UNABLE TO ASSESS

## 2025-01-06 SDOH — SOCIAL STABILITY: SOCIAL INSECURITY: DOES ANYONE TRY TO KEEP YOU FROM HAVING/CONTACTING OTHER FRIENDS OR DOING THINGS OUTSIDE YOUR HOME?: UNABLE TO ASSESS

## 2025-01-06 SDOH — SOCIAL STABILITY: SOCIAL INSECURITY: HAS ANYONE EVER THREATENED TO HURT YOUR FAMILY OR YOUR PETS?: UNABLE TO ASSESS

## 2025-01-06 SDOH — SOCIAL STABILITY: SOCIAL INSECURITY: WERE YOU ABLE TO COMPLETE ALL THE BEHAVIORAL HEALTH SCREENINGS?: NO

## 2025-01-06 SDOH — SOCIAL STABILITY: SOCIAL INSECURITY: DO YOU FEEL ANYONE HAS EXPLOITED OR TAKEN ADVANTAGE OF YOU FINANCIALLY OR OF YOUR PERSONAL PROPERTY?: UNABLE TO ASSESS

## 2025-01-06 ASSESSMENT — COGNITIVE AND FUNCTIONAL STATUS - GENERAL
DAILY ACTIVITIY SCORE: 24
MOBILITY SCORE: 24
PATIENT BASELINE BEDBOUND: NO

## 2025-01-06 ASSESSMENT — LIFESTYLE VARIABLES
EVER HAD A DRINK FIRST THING IN THE MORNING TO STEADY YOUR NERVES TO GET RID OF A HANGOVER: NO
AUDIT-C TOTAL SCORE: -1
AUDIT-C TOTAL SCORE: -1
EVER FELT BAD OR GUILTY ABOUT YOUR DRINKING: NO
HOW MANY STANDARD DRINKS CONTAINING ALCOHOL DO YOU HAVE ON A TYPICAL DAY: PATIENT UNABLE TO ANSWER
SKIP TO QUESTIONS 9-10: 0
TOTAL SCORE: 0
HOW OFTEN DO YOU HAVE 6 OR MORE DRINKS ON ONE OCCASION: PATIENT UNABLE TO ANSWER
HOW OFTEN DO YOU HAVE A DRINK CONTAINING ALCOHOL: PATIENT UNABLE TO ANSWER
HAVE YOU EVER FELT YOU SHOULD CUT DOWN ON YOUR DRINKING: NO
HAVE PEOPLE ANNOYED YOU BY CRITICIZING YOUR DRINKING: NO

## 2025-01-06 ASSESSMENT — ACTIVITIES OF DAILY LIVING (ADL)
PATIENT'S MEMORY ADEQUATE TO SAFELY COMPLETE DAILY ACTIVITIES?: UNABLE TO ASSESS
ADEQUATE_TO_COMPLETE_ADL: UNABLE TO ASSESS
GROOMING: UNABLE TO ASSESS
DRESSING YOURSELF: UNABLE TO ASSESS
BATHING: UNABLE TO ASSESS
LACK_OF_TRANSPORTATION: PATIENT UNABLE TO ANSWER
LACK_OF_TRANSPORTATION: PATIENT UNABLE TO ANSWER
JUDGMENT_ADEQUATE_SAFELY_COMPLETE_DAILY_ACTIVITIES: UNABLE TO ASSESS
TOILETING: UNABLE TO ASSESS
WALKS IN HOME: UNABLE TO ASSESS
FEEDING YOURSELF: UNABLE TO ASSESS
LACK_OF_TRANSPORTATION: PATIENT UNABLE TO ANSWER
HEARING - LEFT EAR: UNABLE TO ASSESS
HEARING - RIGHT EAR: UNABLE TO ASSESS

## 2025-01-06 ASSESSMENT — PATIENT HEALTH QUESTIONNAIRE - PHQ9
1. LITTLE INTEREST OR PLEASURE IN DOING THINGS: NOT AT ALL
2. FEELING DOWN, DEPRESSED OR HOPELESS: NOT AT ALL
SUM OF ALL RESPONSES TO PHQ9 QUESTIONS 1 & 2: 0

## 2025-01-06 ASSESSMENT — PAIN - FUNCTIONAL ASSESSMENT
PAIN_FUNCTIONAL_ASSESSMENT: CPOT (CRITICAL CARE PAIN OBSERVATION TOOL)

## 2025-01-06 NOTE — ED PROCEDURE NOTE
Procedure  Laceration Repair    Performed by: Rinku Beltre MD  Authorized by: Glenn Mitchell MD    Consent:     Consent obtained:  Emergent situation  Universal protocol:     Patient identity confirmed:  Arm band  Anesthesia:     Anesthesia method:  None  Laceration details:     Location:  Face    Face location:  R upper eyelid    Extent:  Partial thickness  Pre-procedure details:     Preparation:  Patient was prepped and draped in usual sterile fashion  Exploration:     Limited defect created (wound extended): no      Hemostasis achieved with:  Direct pressure    Contaminated: no    Treatment:     Area cleansed with:  Saline    Amount of cleaning:  Standard    Irrigation solution:  Sterile saline    Irrigation method:  Syringe    Visualized foreign bodies/material removed: no      Debridement:  None    Undermining:  None  Skin repair:     Repair method:  Sutures    Suture size:  6-0    Suture material:  Nylon    Suture technique:  Simple interrupted  Approximation:     Approximation:  Close  Repair type:     Repair type:  Simple  Post-procedure details:     Dressing:  Open (no dressing)    Procedure completion:  Tolerated well, no immediate complications               Rinku Beltre MD  Resident  01/06/25 0147
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
## 2025-01-06 NOTE — HOSPITAL COURSE
Patient was admitted on 1/6 s/p cardiac arrest and ROSC within one cycle. He was ventilated on minimal vent settings and sedated with fent/prop which is being weaned. Started precedex given agitation. Echo showed reduced EF most likely due to cardiac stunning. VBG morning of admission normalized (PH 7.38, lactate 1.7). Was started on maintenance fluid given high CK.  Extubated with no acute complications on 1/7. Has no ICU needs at this time.

## 2025-01-06 NOTE — PROGRESS NOTES
Vancomycin Dosing by Pharmacy- Cessation of Therapy    Consult to pharmacy for vancomycin dosing has been discontinued by the prescriber, pharmacy will sign off at this time.    Please call pharmacy if there are further questions or re-enter a consult if vancomycin is resumed.     Luz Spangler, PharmD
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
## 2025-01-06 NOTE — H&P
Medical Intensive Care - History and Physical   Subjective    Shamar Cunningham is a 44 y.o. year old male patient admitted on 1/5/2025 with following ICU needs: Intubated, on vasopressors    HPI:  Shamar Cunningham is a 43 y/o M with PMH including schizoaffective disorder, substance use disorder (PCP) who presented to [facility] ED on 1/5 after being found unresponsive by EMS, he was intubated in trauma bay, lost pulses, with ROSC achieved after 1 round of CPR. He is being admitted to [facility] MICU on 1/6 for further management post arrest.    History per the patient limited as he is intubated, sedated on arrival.    In the ED:  - Vitals:   T 36.1, HR 69, /75, RR 13, SpO2 99 on ACVC 20/450/5/30%  - Labs:   CBC: WBC 13.2, Hgb 15.0, plt 174   BMP: Na 140, K 3.7, Cl 102, HCO3 19, BUN 22, Cr 2.22, glu 202   LFT: Ca 9.4, tprot 7.3, alb 4.2, alkphos 65, AST 69, ALT 29, tbili 0.5   Heme: PT 12.2, INR 1.1   hs-TnI 25, lactate 11.2 at 2146   Etoh <10   Urine drug screen: presumed positive for PCP and cannabinoid   VBG (1/5 at 2137) 7.02 / 82 / 18 with lactate 8.1, VBG (1/5 at 2253) 7.24 / 51 / 80 with lactate 3.9    - Imaging:  CT Head w/o contrast trauma protocol  CTA head and neck w/ and w/o  IMPRESSION:  1. No acute intracranial abnormality.  2. No hemodynamically significant intracranial or extracranial  stenosis, occlusion, or aneurysm.  3. Age-indeterminate mildly displaced fracture of the left maxilla at  the base of the left 1st incisor.  4. Age-indeterminate mildly displaced nasal bone fractures.  5. Odontogenic disease with multiple periapical lucencies. Recommend  correlation with physical exam.  6. The orogastric tube is coiled in the oropharynx.    CT C Spine:  IMPRESSION:  No acute fracture or traumatic malalignment of the cervical spine.    CT CAP w/ IV contrast  CT T spine  CT L spine  IMPRESSION:  CT CHEST/ABDOMEN/PELVIS:  No acute traumatic injury.      CT THORACIC AND LUMBAR SPINE:  No acute fracture or  "traumatic malalignment.    In the ED, workup included as above. He was given naloxone injection, ketamine and fentanyl for intubation. Underwent 1 round of CPR and was given IV epinephrine. He was also given some 3% sodium chloride. He was started on fentanyl and propofol for sedation and norepinephrine for pressors support. Trauma was called and sutured his lacerations. He was admitted to MICU for further management.    Review of Systems:  Review of Systems   Unable to perform ROS: Intubated        Meds    Home medications:  Current Outpatient Medications   Medication Instructions    OLANZapine zydis (ZYPREXA) 20 mg, oral, Nightly        Inpatient medications:  Scheduled medications  enoxaparin, 40 mg, subcutaneous, q24h  piperacillin-tazobactam, 4.5 g, intravenous, q6h  rocuronium, , ,       Continuous medications  fentaNYL, 0-300 mcg/hr, Last Rate: 75 mcg/hr (01/06/25 0313)  norepinephrine, 0.01-0.2 mcg/kg/min, Last Rate: Stopped (01/06/25 0313)  propofol, 0-50 mcg/kg/min, Last Rate: 40 mcg/kg/min (01/06/25 0313)      PRN medications  PRN medications: fentaNYL, oxygen, rocuronium, vancomycin     Objective    Blood pressure (!) 124/94, pulse 80, temperature 35.7 °C (96.3 °F), temperature source Temporal, resp. rate 25, height 1.803 m (5' 10.98\"), weight 80 kg (176 lb 5.9 oz), SpO2 100%.     Physical Exam  Constitutional:       Appearance: He is not ill-appearing.      Comments: Intubated and sedated   HENT:      Mouth/Throat:      Mouth: Mucous membranes are moist.   Eyes:      General: No scleral icterus.     Pupils: Pupils are equal, round, and reactive to light.   Cardiovascular:      Rate and Rhythm: Normal rate and regular rhythm.      Pulses: Normal pulses.      Heart sounds: No murmur heard.  Pulmonary:      Breath sounds: No wheezing, rhonchi or rales.   Abdominal:      General: Abdomen is flat. There is no distension.      Palpations: Abdomen is soft.   Musculoskeletal:      Right lower leg: No edema.     " " Left lower leg: No edema.   Skin:     Findings: Signs of injury present.   Neurological:      Comments: Intubated and sedated            Intake/Output Summary (Last 24 hours) at 1/6/2025 0338  Last data filed at 1/6/2025 0313  Gross per 24 hour   Intake 113.69 ml   Output 625 ml   Net -511.31 ml     Labs:   Results from last 72 hours   Lab Units 01/05/25  2146   SODIUM mmol/L 140   POTASSIUM mmol/L 3.7   CHLORIDE mmol/L 102   CO2 mmol/L 19*   BUN mg/dL 22   CREATININE mg/dL 2.22*   GLUCOSE mg/dL 202*   CALCIUM mg/dL 9.4   ANION GAP mmol/L 23*   EGFR mL/min/1.73m*2 37*      Results from last 72 hours   Lab Units 01/05/25  2146   WBC AUTO x10*3/uL 13.2*   HEMOGLOBIN g/dL 15.0   HEMATOCRIT % 44.0   PLATELETS AUTO x10*3/uL 174   NEUTROS PCT AUTO % 54.6   LYMPHS PCT AUTO % 37.9   MONOS PCT AUTO % 6.6   EOS PCT AUTO % 0.1          Results from last 72 hours   Lab Units 01/05/25  2253 01/05/25  2137   POCT PH, VENOUS pH 7.24* 7.02*   POCT PCO2, VENOUS mm Hg 51 82*   POCT PO2, VENOUS mm Hg 80* 18*        Micro/ID:     No results found for: \"URINECULTURE\", \"BLOODCULT\", \"CSFCULTSMEAR\"      Assessment and Plan     Assessment:  Shamar Cunningham is a 43 y/o M with PMH including schizoaffective disorder, substance use disorder (PCP) who presented to [facility] ED on 1/5 after being found unresponsive by EMS, he was intubated in trauma bay, lost pulses, with ROSC achieved after 1 round of CPR. He is being admitted to [facility] MICU on 1/6 for further management post arrest.    Mechanical Ventilation: < 4 days  Sedation/Analgesia:  Fentanyl and Propofol  Restraints: Restraints indicated as alternative therapies have been attempted and have been ineffective.  Restrain with soft wrist restraints and side rails up x4 until medical devices discontinued and/or patient able to participate with plan of care.     Plan:  Shamar Cunningham is a 43 y/o M with PMH including schizoaffective disorder, substance use disorder (PCP) who presented to [facility] " ED on 1/5 after being found unresponsive by EMS, he was intubated in trauma bay, lost pulses, with ROSC achieved after 1 round of CPR. He is being admitted to [facility] MICU on 1/6 for further management post arrest.    NEUROLOGIC  #Encephalopathy  -found down by EMS, intubated in ED 1/5  -likely toxic in setting of PCP use with + UDS on presentation  -potential component of metabolic in setting of hypercapnia with pCO2 on presentation of 82 on initial VBG  -CT head 1/5 without acute processes  -currently sedated on propofol and fentanyl    Plan:  -wean sedation to assess mentation    CARDIOVASCULAR  #Shock, likely hypovolemic vs cardiogenic  #s/p cardiac arrest  -no known cardiac history  -Troponin 25 on presentation  -Lactate 11.2 on presentation, downtrended to 3.9 on VBG on 1/5 at 2253  -lost pulses in ED shortly after presentation and intubation, ROSC achieved after 1 round with 1 dose of epinephrine on 1/5  -On norepinephrine 0.03 on arrival to MICU     Plan:  -Will give 1L LR  -Wean pressors  -echocardiogram    PULMONARY  #Intubated  -intubated 1/5 for airway protection  -Vent settings on arrival ACVC 20/450/5/30%  -no history of lung disease per chart review  -daily SBT    RENAL/  #Respiratory acidosis (improved)  #AGMA  -Initial VBG (1/5 at 2137) with pH 7.02 / pCO2 82, lactate 8.1. Bicarb on CMP 19, with anion gap 19.  -Subsequent VBG (1/5 at 2253) with pH 7.24 / pCO2 51, lactate 3.9    Plan  -repeat VBG on arrival  -giving 1L LR    #ADELINA  -SCr 2.22 on presentation, baseline normal  -unclear etiology, but differential includes prerenal d/t hypovolemia vs potential rhabdo after being found down vs other etiology    Plan:  -add on CK  -UA, urine electrolytes  -Renal US  -renally dose meds, avoid nephrotoxins as able    GASTROINTESTINAL  YUNI    ENDOCRINE  YUNI    HEME/ONC  YUNI    INFECTIOUS DISEASE  YUNI    MSK/DERM  #Multiple facial fractures  #Trauma activation  -CT on presentations with multiple subacute  facial fractures, had lacerations which trauma sutured 1/5  -No cervical spine fractures noted on CT, however patient unable to respond to questions about pain to clear C-Spine    Plan:  -continue c-spine precautions for now, may need MRI to rule out ligamentous injury if patient remains unable to answer questions    PSYCH  #Hx schizoaffective disorder  -per chart review, on fluoxetine 20 daily and olanzapine 30 nightly, trazodone 100 mg nightly  -will hold for now given encephalopathy pending assessment of baseline mentation      ICU Check List     F: giving 1L LR  E: PRN  N: NPO  A: PIV    DVT ppx: lovenox  GI ppx: Not indicated    Hardware:                ETT  7.5 mm (Active)   Placement Date/Time: 01/05/25 2139   ETT Type: ETT - single  Single Lumen Tube Size: 7.5 mm  Location: Oral   Number of days: 0       Urethral Catheter Coude 16 Fr. (Active)   Placement Date/Time: 01/05/25 2240   Catheter Type: Coude  Tube Size (Fr.): 16 Fr.  Catheter Balloon Size: 10 mL   Number of days: 0       Code Status: full code (presumed)  Surrogate Medical Decision-maker: Ann Cunningham (parent) [phone]             Peter Miranda MD PhD   01/06/25 at 3:38 AM     Disclaimer: Documentation completed with the information available at the time of input. The times in the chart may not be reflective of actual patient care times, interventions, or procedures. Documentation occurs after the physical care of the patient.

## 2025-01-06 NOTE — CARE PLAN
The patient's goals for the shift include  n/a     The clinical goals for the shift include pt will remain HDS for the duration of the shift      Problem: Skin  Goal: Participates in plan/prevention/treatment measures  Outcome: Progressing  Flowsheets (Taken 1/6/2025 1254)  Participates in plan/prevention/treatment measures: Elevate heels  Goal: Prevent/manage excess moisture  Outcome: Progressing  Flowsheets (Taken 1/6/2025 1254)  Prevent/manage excess moisture:   Cleanse incontinence/protect with barrier cream   Monitor for/manage infection if present   Follow provider orders for dressing changes   Moisturize dry skin  Goal: Prevent/minimize sheer/friction injuries  Outcome: Progressing  Flowsheets (Taken 1/6/2025 1254)  Prevent/minimize sheer/friction injuries:   Use pull sheet   Complete micro-shifts as needed if patient unable. Adjust patient position to relieve pressure points, not a full turn   HOB 30 degrees or less   Turn/reposition every 2 hours/use positioning/transfer devices   Utilize specialty bed per algorithm  Goal: Promote/optimize nutrition  Outcome: Progressing  Flowsheets (Taken 1/6/2025 1254)  Promote/optimize nutrition: Discuss with provider if NPO > 2 days  Goal: Promote skin healing  Outcome: Progressing  Flowsheets (Taken 1/6/2025 1254)  Promote skin healing:   Assess skin/pad under line(s)/device(s)   Protective dressings over bony prominences   Ensure correct size (line/device) and apply per  instructions   Rotate device position/do not position patient on device   Turn/reposition every 2 hours/use positioning/transfer devices     Problem: Fall/Injury  Goal: Not fall by end of shift  Outcome: Progressing  Goal: Be free from injury by end of the shift  Outcome: Progressing  Goal: Verbalize understanding of personal risk factors for fall in the hospital  Outcome: Progressing  Goal: Verbalize understanding of risk factor reduction measures to prevent injury from fall in the  home  Outcome: Progressing  Goal: Use assistive devices by end of the shift  Outcome: Progressing  Goal: Pace activities to prevent fatigue by end of the shift  Outcome: Progressing     Problem: Respiratory  Goal: Clear secretions with interventions this shift  Outcome: Progressing  Goal: Minimize anxiety/maximize coping throughout shift  Outcome: Progressing  Goal: Minimal/no exertional discomfort or dyspnea this shift  Outcome: Progressing  Goal: No signs of respiratory distress (eg. Use of accessory muscles. Peds grunting)  Outcome: Progressing  Goal: Patent airway maintained this shift  Outcome: Progressing  Goal: Tolerate mechanical ventilation evidenced by VS/agitation level this shift  Outcome: Progressing  Goal: Tolerate pulmonary toileting this shift  Outcome: Progressing  Goal: Verbalize decreased shortness of breath this shift  Outcome: Progressing  Goal: Wean oxygen to maintain O2 saturation per order/standard this shift  Outcome: Progressing  Goal: Increase self care and/or family involvement in next 24 hours  Outcome: Progressing     Problem: Safety - Medical Restraint  Goal: Remains free of injury from restraints (Restraint for Interference with Medical Device)  Outcome: Progressing  Flowsheets (Taken 1/6/2025 125)  Remains free of injury from restraints (restraint for interference with medical device):   Determine that other, less restrictive measures have been tried or would not be effective before applying the restraint   Evaluate the patient's condition at the time of restraint application   Inform patient/family regarding the reason for restraint   Every 2 hours: Monitor safety, psychosocial status, comfort, nutrition and hydration  Goal: Free from restraint(s) (Restraint for Interference with Medical Device)  Outcome: Progressing  Flowsheets (Taken 1/6/2025 1254)  Free from restraint(s) (restraint for interference with medical device):   ONCE/SHIFT or MINIMUM Every 12 hours: Assess and document  the continuing need for restraints   Every 24 hours: Continued use of restraint requires Licensed Independent Practitioner to perform face to face examination and written order   Identify and implement measures to help patient regain control     Problem: Nutrition  Goal: Less than 5 days NPO/clear liquids  Outcome: Progressing  Goal: Oral intake greater than 50%  Outcome: Progressing  Goal: Oral intake greater 75%  Outcome: Progressing  Goal: Consume prescribed supplement  Outcome: Progressing  Goal: Adequate PO fluid intake  Outcome: Progressing  Goal: Nutrition support goals are met within 48 hrs  Outcome: Progressing  Goal: Nutrition support is meeting 75% of nutrient needs  Outcome: Progressing  Goal: Tube feed tolerance  Outcome: Progressing  Goal: BG  mg/dL  Outcome: Progressing  Goal: Lab values WNL  Outcome: Progressing  Goal: Electrolytes WNL  Outcome: Progressing  Goal: Promote healing  Outcome: Progressing  Goal: Maintain stable weight  Outcome: Progressing  Goal: Reduce weight from edema/fluid  Outcome: Progressing  Goal: Gradual weight gain  Outcome: Progressing  Goal: Improve ostomy output  Outcome: Progressing     Problem: Pain - Adult  Goal: Verbalizes/displays adequate comfort level or baseline comfort level  Outcome: Progressing     Problem: Safety - Adult  Goal: Free from fall injury  Outcome: Progressing     Problem: Discharge Planning  Goal: Discharge to home or other facility with appropriate resources  Outcome: Progressing     Problem: Chronic Conditions and Co-morbidities  Goal: Patient's chronic conditions and co-morbidity symptoms are monitored and maintained or improved  Outcome: Progressing     Problem: Knowledge Deficit  Goal: Patient/family/caregiver demonstrates understanding of disease process, treatment plan, medications, and discharge instructions  Outcome: Progressing     Problem: Mechanical Ventilation  Goal: Patient Will Maintain Patent Airway  Outcome: Progressing  Goal:  Oral health is maintained or improved  Outcome: Progressing  Goal: Tracheostomy will be managed safely  Outcome: Progressing  Goal: ET tube will be managed safely  Outcome: Progressing  Goal: Ability to express needs and understand communication  Outcome: Progressing  Goal: Mobility/activity is maintained at optimum level for patient  Outcome: Progressing     Problem: Pain  Goal: Takes deep breaths with improved pain control throughout the shift  Outcome: Progressing  Goal: Turns in bed with improved pain control throughout the shift  Outcome: Progressing  Goal: Walks with improved pain control throughout the shift  Outcome: Progressing  Goal: Performs ADL's with improved pain control throughout shift  Outcome: Progressing  Goal: Participates in PT with improved pain control throughout the shift  Outcome: Progressing  Goal: Free from opioid side effects throughout the shift  Outcome: Progressing  Goal: Free from acute confusion related to pain meds throughout the shift  Outcome: Progressing

## 2025-01-06 NOTE — PROGRESS NOTES
Pharmacy Admission Order Reconciliation Review    Shamar Cunningham is a 44 y.o. male admitted for Cardiac arrest. Pharmacy reviewed the patient's unreconciled admission medications.    Prior to admission medications that were reviewed and acted on by the pharmacist include:  tylenol  albuterol inhaler  These medications have been reconciled.     Any other unreconcilied medications have been addressed and will be ordered or held by the patient's medical team. Medications addressed by the pharmacist may be added or changed by the patient's medical team at any time.    Jonn Aldrich, PharmD  Transitions of Care Pharmacist  [facility]  Please reach out via Secure Chat for questions

## 2025-01-06 NOTE — ED PROCEDURE NOTE
Procedure  Intubation    Performed by: Uzma Shanks MD  Authorized by: Glenn Mitchell MD    Consent:     Consent obtained:  Emergent situation  Universal protocol:     Patient identity confirmed:  Anonymous protocol, patient vented/unresponsive  Pre-procedure details:     Indications: airway protection and altered consciousness      Patient status:  Unresponsive    Look externally: facial hair and facial trauma      Mouth opening - incisor distance:  3 or more finger widths    Hyoid-mental distance: 2 finger widths      Hyoid-thyroid distance: 2 or more finger widths      Obstruction: none      Neck mobility: normal      Pharmacologic strategy: none      Induction agents:  None    Paralytics:  None  Procedure details:     Preoxygenation:  Bag valve mask    CPR in progress: yes      Number of attempts:  1  Successful intubation attempt details:     Intubation method:  Oral    Intubation technique: video assisted      Laryngoscope blade:  Hypercurved    Bougie used: no      Grade view: I      Tube size (mm):  7.5    Tube type:  Cuffed    Tube visualized through cords: yes    Placement assessment:     ETT at teeth/gumline (cm):  24    Tube secured with:  ETT vergara    Breath sounds:  Equal    Placement verification: chest rise, colorimetric ETCO2, CXR verification, direct visualization and waveform ETCO2      CXR findings:  High    Tube repositioned: yes    Post-procedure details:     Procedure completion:  Tolerated    Uzma Shanks MD  Emergency Medicine PGY2  [facility]             Uzma Shanks MD  Resident  01/05/25 7719

## 2025-01-06 NOTE — PROGRESS NOTES
Pharmacy Medication History Review    Shamar Cunningham is a 44 y.o. male admitted for Cardiac arrest. Pharmacy reviewed the patient's dssyx-ta-yaphigfyx medications and allergies for accuracy.    Medications ADDED:  All medications on PTA list  Medications CHANGED:  None  Medications REMOVED:   Zyprexa ODT     The list below reflects the updated PTA list.   Prior to Admission Medications   Prescriptions Informant   FLUoxetine (PROzac) 20 mg capsule Other   Sig: Take 1 capsule (20 mg) by mouth once daily.   OLANZapine (ZyPREXA) 15 mg tablet Other   Sig: Take 2 tablets (30 mg) by mouth once daily at bedtime.   acetaminophen (Tylenol) 500 mg capsule Other   Sig: Take 1 capsule (500 mg) by mouth every 8 hours if needed (pain).   albuterol 90 mcg/actuation inhaler Other   Sig: Inhale 1 puff every 4 hours if needed for shortness of breath.   benztropine (Cogentin) 2 mg tablet Other   Sig: Take 1 tablet (2 mg) by mouth 2 times a day.   multivitamin with minerals tablet Other   Sig: Take 1 tablet by mouth once daily.   rosuvastatin (Crestor) 20 mg tablet Other   Sig: Take 1 tablet (20 mg) by mouth once daily at bedtime.   traZODone (Desyrel) 100 mg tablet Other   Sig: Take 1 tablet (100 mg) by mouth once daily at bedtime.      Facility-Administered Medications: None        The list below reflects the updated allergy list. Please review each documented allergy for additional clarification and justification.  Allergies  Reviewed by Kinza Gilliam RN on 1/6/2025        Severity Reactions Comments    Ibuprofen Not Specified Nausea Only, Nausea/vomiting     Gluten Protein Low Diarrhea             Patient was unable to be assessed for M2B at discharge.     Sources:   [facility]  Pharmacy dispense history  Patient interview Unable to provide any details, patient currently intubated  Parent, called Ann Cunningham #[phone] but it was a wrong number  Chart Review  Care Everywhere  12/16/24 [facility] office  "visit, MAGGIE clinical summary note    Additional Comments:  No recent dispense history per OARRS report  Patient currently intubated, so attempted to call patient's contact on file, but it was a wrong number  Completed PTA med list per chart review and pharmacy dispense history      Jonn Aldrich, PharmD  Transitions of Care Pharmacist  01/06/25     Secure Chat preferred   If no response call [phone] or Vocera \"Med Rec\"    "

## 2025-01-06 NOTE — PROGRESS NOTES
SW was contacted for assistance with locating [facility] for pt who is currently intubated.  Several numbers for mother, Ann Cunningham, were incorrect.    Pt has services through [facility] as well as through the supported living building where he resides.   from both were contacted.  CM at [facility] provided samem number as CM from the mental health agency - [phone].  Call to this number went to  and the message does not give a name.  Left  message requesting a return call.  Pt lives at [facility].  CM at the [facility] is Virginia Galvan, [phone]  CM from [facility] ([phone] or [phone]) reported that pt has not been seen recently.  At this time, pt appears to be a PWP (pt without proxy)  and ethics would need to be contacted for medical decisions.  Updated the team and informed Vin Huntley, the clinical ethicist on service this week  SW will continue to follow

## 2025-01-06 NOTE — H&P
[facility]  TRAUMA SERVICE - HISTORY AND PHYSICAL / CONSULT    Patient Name: Shamar Cunningham  MRN: [id number]  Admit Date: 105  : 1980  AGE: 44 y.o.   GENDER: male  ==============================================================================  MECHANISM OF INJURY / CHIEF COMPLAINT:   44 y.o. male presenting after a fall and patient unresponsive per EMS with concern for drug overdose. Patient was brought in being bagged and was subsequently intubated in the trauma bay. Patient lost pulses and CPR was started. Patient regained pulses after a round of CPR.  LOC (yes/no?): Yes  Anticoagulant / Anti-platelet Rx? (for what dx?): Unknown  Referring Facility Name (N/A for scene EMR run): n/a    INJURIES:   Right eyebrow laceration  Lower Lip laceration    OTHER MEDICAL PROBLEMS:  Schizophrenia    INCIDENTAL FINDINGS:  none    ==============================================================================  ADMISSION PLAN OF CARE:  Eyebrow and lip laceration both washed out and repaired; Remove eyebrow sutures in 5-7 days; lip laceration repaired with absorbable suture   No other traumatic injuries trauma will sign off  Dispo per ED  Consultants notified (specialty, provider name, time): none    ==============================================================================  PAST MEDICAL HISTORY:   PMH:   Past Medical History:   Diagnosis Date    Other conditions influencing health status 2013    Closed Fracture Of Scaphoid Bone    Other conditions influencing health status     Involutional Melancholia (MDD) - Severe, With Psychotic Behavior    Pain in unspecified ankle and joints of unspecified foot     Toe joint pain    Personal history of other diseases of the nervous system and sense organs 07/10/2015    History of carpal tunnel syndrome    Personal history of other specified conditions     History of insomnia       PSH:   Past Surgical History:   Procedure Laterality Date     OTHER SURGICAL HISTORY  07/10/2015    Wrist Surgery    OTHER SURGICAL HISTORY  07/10/2015    Brain Surgery       FH:   No family history on file.    SOCIAL HISTORY:    Smoking:    Social History     Tobacco Use   Smoking Status Every Day    Types: Cigarettes   Smokeless Tobacco Never       Alcohol:    Social History     Substance and Sexual Activity   Alcohol Use None       Drug use: unknown    MEDICATIONS:   Prior to Admission medications    Medication Sig Start Date End Date Taking? Authorizing Provider   OLANZapine zydis (ZyPREXA) 20 mg disintegrating tablet Take 1 tablet (20 mg) by mouth once daily at bedtime. 7/8/24 8/7/24  Ivone Taylor DO       ALLERGIES:   Allergies   Allergen Reactions    Ibuprofen Nausea Only and Nausea/vomiting    Gluten Protein Diarrhea       REVIEW OF SYSTEMS:  Negative except as indicated above    PHYSICAL EXAM:  PRIMARY SURVEY:  Airway  Airway is compromised.   Interventions:  Patient was intubated.  Breathing  Breathing is apneic. Right breath sounds are normal. Right breath sounds are decreased. Left breath sounds are normal. Left breath sounds are decreased.   Interventions:  Patient was intubated and placed on vent.  Circulation  Cardiac rhythm is regular. Rate is bradycardic.   Pulses    Femoral: 0 on the right; 0 on the left.  Interventions:  He then lost pulses and CPR was started. He regained pulses after 1 round of CPR.  Disability  Hayden Coma Score  Eye:1   Verbal:1T   Motor:1      3T  Pupils  Right Pupil:   round   not reactive   3 mm  Left Pupil:   round   not reactive   5 mm     Motor Strength   strength:  0/5 on the right  0/5 on the left  Dorsiflex strength:  0/5 on the right  0/5 on the left  Plantarflex strength:  0/5 on the right  0/5 on the left      Exam - eFAST  No fluid in the pericardial window    No fluid in the abdomen right upper quadrant, abdomen left upper quadrant, pelvis, right lung and left lung.         SECONDARY SURVEY/PHYSICAL  EXAM:  Head: 2cm laceration near right eyebrow. No cephalohematoma.  Eye: Pupils unequal, round, and NOT reactive to light. No orbital ridge bony step-offs, or tenderness.  ENT:   Midface is stable.  + epistaxis.  No blood or CSF drainage and external auditory canals.  Missing front teeth. 3cm lower lip laceration  Neck: Cervical collar present. Trachea is midline.  Chest: Clear to auscultation bilaterally, no chest wall tenderness crepitus, flail segments noted.  No bruising or abrasions noted to the anterior chest wall.  Cardiovascular: Regular rate, rhythm  Abdomen: Soft, non distended.  No bruising or lacerations noted.  Not peritonitic.  Pelvis: Stable to compression  : Normal external genitalia, no blood at the urethral meatus  Back/spine: No midline T or L-spine step-offs, or deformities.  No lacerations, abrasions, or bruising noted.  Extremities: No gross bony deformities, no bony tenderness palpation.   Skin: No lacerations, bruises, abrasions noted.  Neuro: Intubated and sedated. Face symmetric.    IMAGING SUMMARY:     CT head W O contrast trauma protocol   Final Result   1. No acute intracranial abnormality.   2. No hemodynamically significant intracranial or extracranial   stenosis, occlusion, or aneurysm.   3. Age-indeterminate mildly displaced fracture of the left maxilla at   the base of the left 1st incisor.   4. Age-indeterminate mildly displaced nasal bone fractures.   5. Odontogenic disease with multiple periapical lucencies. Recommend   correlation with physical exam.   6. The orogastric tube is coiled in the oropharynx.        I personally reviewed the images/study and I agree with the findings   as stated by Dr. Zachary Alfaro. This study was interpreted at   [place], [place].        MACRO:   None.        Signed by: Iris Dunaway 1/5/2025 11:47 PM   Dictation workstation:   [id number]      CT cervical spine wo IV contrast   Final Result   No acute  fracture or traumatic malalignment of the cervical spine.        I personally reviewed the images/study and I agree with the findings   as stated by Dr. Zachary Alfaro. This study was interpreted at   [place], [place].        MACRO:   None.        Signed by: Iris Dunaway 1/5/2025 11:49 PM   Dictation workstation:   [id number]      CT thoracic spine wo IV contrast   Final Result   CT CHEST/ABDOMEN/PELVIS:   No acute traumatic injury.        CT THORACIC AND LUMBAR SPINE:   No acute fracture or traumatic malalignment.        I personally reviewed the images/study and I agree with the findings   as stated by Dr. Zachary Alfaro. This study was interpreted at   [place], [place].        MACRO:   None.        Signed by: Iris Dunaway 1/6/2025 12:02 AM   Dictation workstation:   [id number]      CT lumbar spine wo IV contrast   Final Result   CT CHEST/ABDOMEN/PELVIS:   No acute traumatic injury.        CT THORACIC AND LUMBAR SPINE:   No acute fracture or traumatic malalignment.        I personally reviewed the images/study and I agree with the findings   as stated by Dr. Zachary Alfaro. This study was interpreted at   [place], [place].        MACRO:   None.        Signed by: Iris Dunaway 1/6/2025 12:02 AM   Dictation workstation:   [id number]      CT chest abdomen pelvis w IV contrast   Final Result   CT CHEST/ABDOMEN/PELVIS:   No acute traumatic injury.        CT THORACIC AND LUMBAR SPINE:   No acute fracture or traumatic malalignment.        I personally reviewed the images/study and I agree with the findings   as stated by Dr. Zachary Alfaro. This study was interpreted at   [place], [place].        MACRO:   None.        Signed by: Iris Dunaway 1/6/2025 12:02 AM   Dictation workstation:   [id number]      CT angio head and neck w and wo IV  contrast   Final Result   1. No acute intracranial abnormality.   2. No hemodynamically significant intracranial or extracranial   stenosis, occlusion, or aneurysm.   3. Age-indeterminate mildly displaced fracture of the left maxilla at   the base of the left 1st incisor.   4. Age-indeterminate mildly displaced nasal bone fractures.   5. Odontogenic disease with multiple periapical lucencies. Recommend   correlation with physical exam.   6. The orogastric tube is coiled in the oropharynx.        I personally reviewed the images/study and I agree with the findings   as stated by Dr. Zachary Alfaro. This study was interpreted at   [place], [place].        MACRO:   None.        Signed by: Iris Dunaway 1/5/2025 11:47 PM   Dictation workstation:   [id number]      XR chest 1 view   Final Result   1.  No acute cardiopulmonary process.   2. Medical devices as above.        I personally reviewed the images/study and I agree with the findings   as stated by Dr. Zachary Alfaro. This study was interpreted at   [place], [place].        MACRO:   None        Signed by: Iris Dunaway 1/6/2025 12:03 AM   Dictation workstation:   [id number]      XR pelvis 1-2 views   Final Result   Normal radiograph of the pelvis.        I personally reviewed the images/study and I agree with the findings   as stated by Dr. Zachary Alfaro. This study was interpreted at   [place], [place].        MACRO:   None        Signed by: Iris Dunaway 1/6/2025 12:04 AM   Dictation workstation:   [id number]          LABS:  Results from last 7 days   Lab Units 01/05/25  2146   WBC AUTO x10*3/uL 13.2*   HEMOGLOBIN g/dL 15.0   HEMATOCRIT % 44.0   PLATELETS AUTO x10*3/uL 174   NEUTROS PCT AUTO % 54.6   LYMPHS PCT AUTO % 37.9   MONOS PCT AUTO % 6.6   EOS PCT AUTO % 0.1     Results from last 7 days   Lab Units 01/05/25  2146   INR   1.1     Results from last 7 days   Lab Units 01/05/25  2146   SODIUM mmol/L 140   POTASSIUM mmol/L 3.7   CHLORIDE mmol/L 102   CO2 mmol/L 19*   BUN mg/dL 22   CREATININE mg/dL 2.22*   CALCIUM mg/dL 9.4   PROTEIN TOTAL g/dL 7.3   BILIRUBIN TOTAL mg/dL 0.5   ALK PHOS U/L 65   ALT U/L 29   AST U/L 69*   GLUCOSE mg/dL 202*     Results from last 7 days   Lab Units 01/05/25  2146   BILIRUBIN TOTAL mg/dL 0.5           I have reviewed all laboratory and imaging results ordered/pertinent for this encounter.

## 2025-01-06 NOTE — CONSULTS
Vancomycin Dosing by Pharmacy- INITIAL    Shamar Cunningham is a 44 y.o. year old male who Pharmacy has been consulted for vancomycin dosing for sepsis. Based on the patient's indication and renal status this patient will be dosed based on a goal trough/random level of 15-20.     Renal function is currently unstable- will dose vancomycin per level.    Visit Vitals  BP (!) 124/94   Pulse 70   Temp 35.7 °C (96.3 °F) (Temporal)   Resp 20        Lab Results   Component Value Date    CREATININE 2.22 (H) 2025    CREATININE 1.25 2024    CREATININE 1.12 2024    CREATININE 1.08 2024        Patient weight is as follows:   Vitals:    25   Weight: 80 kg (176 lb 5.9 oz)       Cultures:  No results found for the encounter in last 14 days.        No intake/output data recorded.  I/O during current shift:  I/O this shift:  In: 113.7 [I.V.:113.7]  Out: 625 [Urine:625]    Temp (24hrs), Av.9 °C (96.7 °F), Min:35.7 °C (96.3 °F), Max:36.1 °C (97 °F)         Assessment/Plan     Patient will be given a loading dose of 1500 mg.    Will initiate vancomycin maintenance, dose per level.    Follow-up trough level will be ordered on  with AM labs, unless clinically indicated sooner.  Will continue to monitor renal function daily while on vancomycin and order serum creatinine at least every 48 hours if not already ordered.  Follow for continued vancomycin needs, clinical response, and signs/symptoms of toxicity.       Luz Spangler, PharmD

## 2025-01-06 NOTE — ED PROCEDURE NOTE
Procedure  Laceration Repair    Performed by: Rinku Beltre MD  Authorized by: Glenn Mitchell MD    Consent:     Consent obtained:  Emergent situation  Universal protocol:     Patient identity confirmed:  Arm band  Anesthesia:     Anesthesia method:  None  Laceration details:     Location:  Lip    Lip location:  Lower interior lip  Exploration:     Hemostasis achieved with:  Direct pressure  Treatment:     Area cleansed with:  Saline    Amount of cleaning:  Standard    Irrigation solution:  Sterile saline    Irrigation method:  Syringe    Visualized foreign bodies/material removed: no      Debridement:  None    Undermining:  None    Scar revision: no    Skin repair:     Repair method:  Sutures    Suture size:  6-0    Suture material:  Plain gut    Suture technique:  Simple interrupted  Approximation:     Approximation:  Close  Repair type:     Repair type:  Simple  Post-procedure details:     Dressing:  Open (no dressing)    Procedure completion:  Tolerated well, no immediate complications               Rinku Beltre MD  Resident  01/06/25 0149

## 2025-01-06 NOTE — ED PROVIDER NOTES
Emergency Department Provider Note        History of Present Illness     CC: Drug Overdose and Fall     HPI:  This is a 44-year-old male brought in by EMS for unresponsiveness.  Received Narcan by EMS prior to arrival.  Upon arrival here is not spontaneously breathing and is being bagged.    Limitations to history: Unresponsive  Independent historian(s): EMS  Records Reviewed: Recent available ED and inpatient notes reviewed in EMR.    PMHx/PSHx:  Per HPI.   - has a past medical history of Other conditions influencing health status (07/20/2013), Other conditions influencing health status, Pain in unspecified ankle and joints of unspecified foot, Personal history of other diseases of the nervous system and sense organs (07/10/2015), and Personal history of other specified conditions.  - has a past surgical history that includes Other surgical history (07/10/2015) and Other surgical history (07/10/2015).    Medications:  Reviewed in EMR. See EMR for complete list of medications and doses.    Allergies:  Ibuprofen and Gluten protein    Social History:  - Tobacco:  reports that he has been smoking cigarettes. He has never used smokeless tobacco.   - Alcohol:  has no history on file for alcohol use.   - Illicit Drugs:  reports current drug use. Drugs: PCP and Marijuana.     ROS:  Per HPI.       Physical Exam     Triage Vitals:  T 36.1 °C (97 °F)  HR (!) 133  BP (!) 122/105  RR 14  O2 (!) 86 %      General: Male patient, eyes closed, few spontaneous breaths, not following commands, not responsive.  Head: Dried blood throughout the face.  Laceration to the right eyebrow.  Neck:  No gross masses.   Eyes: Left pupil 5 mm, right pupil 3 mm, both minimally reactive.  ENT: Blood in the posterior oropharynx.  CV: Bradycardic.  Regular rhythm. No murmurs, rubs, gallops appreciated. 2+ radial pulses bilaterally.  Resp: Clear to auscultation bilaterally.   GI: Soft, non-distended.    MSK: No gross step offs or  deformities.  EXT: No peripheral edema, contusions, or wounds.  Skin: Warm and dry, no rashes or lesions.  Neuro: Obtunded.  Not responsive.  Not following simple commands.  Few shallow breaths.  Minimal response to pain in all 4 extremities.        Medical Decision Making & ED Course     Labs:   Labs Reviewed   CBC WITH AUTO DIFFERENTIAL - Abnormal       Result Value    WBC 13.2 (*)     nRBC 0.0      RBC 4.73      Hemoglobin 15.0      Hematocrit 44.0      MCV 93      MCH 31.7      MCHC 34.1      RDW 13.2      Platelets 174      Neutrophils % 54.6      Immature Granulocytes %, Automated 0.5      Lymphocytes % 37.9      Monocytes % 6.6      Eosinophils % 0.1      Basophils % 0.3      Neutrophils Absolute 7.22      Immature Granulocytes Absolute, Automated 0.07      Lymphocytes Absolute 5.02 (*)     Monocytes Absolute 0.88      Eosinophils Absolute 0.01      Basophils Absolute 0.04     COMPREHENSIVE METABOLIC PANEL - Abnormal    Glucose 202 (*)     Sodium 140      Potassium 3.7      Chloride 102      Bicarbonate 19 (*)     Anion Gap 23 (*)     Urea Nitrogen 22      Creatinine 2.22 (*)     eGFR 37 (*)     Calcium 9.4      Albumin 4.2      Alkaline Phosphatase 65      Total Protein 7.3      AST 69 (*)     Bilirubin, Total 0.5      ALT 29     LACTATE - Abnormal    Lactate 11.2 (*)     Narrative:     Venipuncture immediately after or during the administration of Metamizole may lead to falsely low results. Testing should be performed immediately prior to Metamizole dosing.   DRUG SCREEN,URINE - Abnormal    Amphetamine Screen, Urine Presumptive Negative      Barbiturate Screen, Urine Presumptive Negative      Benzodiazepines Screen, Urine Presumptive Negative      Cannabinoid Screen, Urine Presumptive Positive (*)     Cocaine Metabolite Screen, Urine Presumptive Negative      Fentanyl Screen, Urine Presumptive Negative      Opiate Screen, Urine Presumptive Negative      Oxycodone Screen, Urine Presumptive Negative      PCP  Screen, Urine Presumptive Positive (*)     Methadone Screen, Urine Presumptive Negative      Narrative:     Drug screen results are presumptive and should not be used to assess   compliance with prescribed medication. Contact the performing [facility] laboratory   to add-on definitive confirmatory testing if clinically indicated.    Toxicology screening results are reported qualitatively. The concentration must   be greater than or equal to the cutoff to be reported as positive. The concentration   at which the screening test can detect an individual drug or metabolite varies.   The absence of expected drug(s) and/or drug metabolite(s) may indicate non-compliance,   inappropriate timing of specimen collection relative to drug administration, poor drug   absorption, diluted/adulterated urine, or limitations of testing. For medical purposes   only; not valid for forensic use.    Interpretive questions should be directed to the laboratory medical directors.   URINALYSIS WITH REFLEX MICROSCOPIC - Abnormal    Color, Urine Light-Yellow      Appearance, Urine Clear      Specific Gravity, Urine >1.050 (*)     pH, Urine 6.0      Protein, Urine 30 (1+) (*)     Glucose, Urine Normal      Blood, Urine 0.06 (1+) (*)     Ketones, Urine NEGATIVE      Bilirubin, Urine NEGATIVE      Urobilinogen, Urine Normal      Nitrite, Urine NEGATIVE      Leukocyte Esterase, Urine NEGATIVE     RENAL FUNCTION PANEL - Abnormal    Glucose 98      Sodium 140      Potassium 4.5      Chloride 105      Bicarbonate 27      Anion Gap 13      Urea Nitrogen 22      Creatinine 1.62 (*)     eGFR 53 (*)     Calcium 9.2      Phosphorus 4.1      Albumin 3.9     CBC WITH AUTO DIFFERENTIAL - Abnormal    WBC 10.4      nRBC 0.0      RBC 4.37 (*)     Hemoglobin 13.9      Hematocrit 38.3 (*)     MCV 88      MCH 31.8      MCHC 36.3 (*)     RDW 13.2      Platelets 173      Neutrophils % 73.4      Immature Granulocytes %, Automated 0.4      Lymphocytes % 19.2      Monocytes %  6.8      Eosinophils % 0.0      Basophils % 0.2      Neutrophils Absolute 7.67      Immature Granulocytes Absolute, Automated 0.04      Lymphocytes Absolute 2.00      Monocytes Absolute 0.71      Eosinophils Absolute 0.00      Basophils Absolute 0.02     BLOOD GAS VENOUS FULL PANEL - Abnormal    POCT pH, Venous 7.37      POCT pCO2, Venous 44      POCT pO2, Venous 58 (*)     POCT SO2, Venous 89 (*)     POCT Oxy Hemoglobin, Venous 87.1 (*)     POCT Hematocrit Calculated, Venous 43.0      POCT Sodium, Venous 137      POCT Potassium, Venous 4.6      POCT Chloride, Venous 104      POCT Ionized Calicum, Venous 1.21      POCT Glucose, Venous 98      POCT Lactate, Venous 1.2      POCT Base Excess, Venous -0.2      POCT HCO3 Calculated, Venous 25.4      POCT Hemoglobin, Venous 14.4      POCT Anion Gap, Venous 12.0      Patient Temperature 37.0      FiO2 30     CREATINE KINASE - Abnormal    Creatine Kinase 2,836 (*)    MICROSCOPIC ONLY, URINE - Abnormal    WBC, Urine 6-10 (*)     RBC, Urine 11-20 (*)     Mucus, Urine 1+     CREATINE KINASE - Abnormal    Creatine Kinase 3,303 (*)    RENAL FUNCTION PANEL - Abnormal    Glucose 91      Sodium 140      Potassium 4.1      Chloride 107      Bicarbonate 27      Anion Gap 10      Urea Nitrogen 22      Creatinine 1.40 (*)     eGFR 64      Calcium 8.8      Phosphorus 2.4 (*)     Albumin 3.4     CREATINE KINASE - Abnormal    Creatine Kinase 2,825 (*)    POCT GLUCOSE - Abnormal    POCT Glucose 132 (*)    BLOOD GAS VENOUS FULL PANEL UNSOLICITED - Abnormal    POCT pH, Venous 7.02 (*)     POCT pCO2, Venous 82 (*)     POCT pO2, Venous 18 (*)     POCT SO2, Venous 9 (*)     POCT Oxy Hemoglobin, Venous 9.2 (*)     POCT Hematocrit Calculated, Venous 46.0      POCT Sodium, Venous 138      POCT Potassium, Venous 3.8      POCT Chloride, Venous 101      POCT Ionized Calicum, Venous 1.23      POCT Glucose, Venous 201 (*)     POCT Lactate, Venous 8.1 (*)     POCT Base Excess, Venous -11.7 (*)     POCT  HCO3 Calculated, Venous 21.2 (*)     POCT Hemoglobin, Venous 15.4      POCT Anion Gap, Venous 20.0      Patient Temperature 37.0     BLOOD GAS VENOUS FULL PANEL UNSOLICITED - Abnormal    POCT pH, Venous 7.24 (*)     POCT pCO2, Venous 51      POCT pO2, Venous 80 (*)     POCT SO2, Venous 96 (*)     POCT Oxy Hemoglobin, Venous 93.4 (*)     POCT Hematocrit Calculated, Venous 40.0 (*)     POCT Sodium, Venous 135 (*)     POCT Potassium, Venous 4.2      POCT Chloride, Venous 103      POCT Ionized Calicum, Venous 1.14      POCT Glucose, Venous 200 (*)     POCT Lactate, Venous 3.9 (*)     POCT Base Excess, Venous -5.8 (*)     POCT HCO3 Calculated, Venous 21.9 (*)     POCT Hemoglobin, Venous 13.3 (*)     POCT Anion Gap, Venous 14.0      Patient Temperature 37.0     BLOOD GAS VENOUS FULL PANEL UNSOLICITED - Abnormal    POCT pH, Venous 7.33      POCT pCO2, Venous 50      POCT pO2, Venous 60 (*)     POCT SO2, Venous 89 (*)     POCT Oxy Hemoglobin, Venous 87.0 (*)     POCT Hematocrit Calculated, Venous 43.0      POCT Sodium, Venous 136      POCT Potassium, Venous 4.9      POCT Chloride, Venous 105      POCT Ionized Calicum, Venous 1.21      POCT Glucose, Venous 125 (*)     POCT Lactate, Venous 1.2      POCT Base Excess, Venous -0.3      POCT HCO3 Calculated, Venous 26.4 (*)     POCT Hemoglobin, Venous 14.4      POCT Anion Gap, Venous 10.0      Patient Temperature 37.0     ALCOHOL - Normal    Alcohol <10     PROTIME-INR - Normal    Protime 12.2      INR 1.1     TROPONIN I, HIGH SENSITIVITY - Normal    Troponin I, High Sensitivity (CMC) 25      Narrative:     Less than 99th percentile of normal range cutoff-  Female and children under 18 years old <35 ng/L; Male <54 ng/L: Negative  Repeat testing should be performed if clinically indicated.     Female and children under 18 years old  ng/L; Male  ng/L:  Consistent with possible cardiac damage and possible increased clinical   risk. Serial measurements may help to assess  extent of myocardial damage.     >120 ng/L: Consistent with cardiac damage, increased clinical risk and  myocardial infarction. Serial measurements may help assess extent of   myocardial damage.      NOTE: Children less than 1 year old may have higher baseline troponin   levels and results should be interpreted in conjunction with the overall   clinical context.    NOTE: Troponin I testing is performed using a different   testing methodology at [facility] than at other   [facility]. Direct result comparisons should only   be made within the same method.     LACTATE - Normal    Lactate 1.6      Narrative:     Venipuncture immediately after or during the administration of Metamizole may lead to falsely low results. Testing should be performed immediately prior to Metamizole dosing.   MAGNESIUM - Normal    Magnesium 2.28     ACUTE TOXICOLOGY PANEL, BLOOD - Normal    Acetaminophen <10.0      Salicylate  <3      Alcohol <10     TYPE AND SCREEN    ABO TYPE B      Rh TYPE POS      ANTIBODY SCREEN NEG     ELECTROLYTE PANEL, URINE    Sodium, Urine Random 26      Sodium/Creatinine Ratio 15      Potassium, Urine Random 98      Potassium/Creatinine Ratio 57      Chloride, Urine Random 61      Chloride/Creatinine Ratio 35      Creatinine, Urine Random 172.3     BLOOD GAS VENOUS FULL PANEL    POCT pH, Venous 7.38      POCT pCO2, Venous 43      POCT pO2, Venous 42      POCT SO2, Venous 67      POCT Oxy Hemoglobin, Venous 65.3      POCT Hematocrit Calculated, Venous 43.0      POCT Sodium, Venous 138      POCT Potassium, Venous 4.3      POCT Chloride, Venous 105      POCT Ionized Calicum, Venous 1.20      POCT Glucose, Venous 80      POCT Lactate, Venous 1.7      POCT Base Excess, Venous 0.0      POCT HCO3 Calculated, Venous 25.4      POCT Hemoglobin, Venous 14.3      POCT Anion Gap, Venous 12.0      Patient Temperature 37.0      FiO2 30     BLOOD GAS VENOUS FULL PANEL   BLOOD GAS LACTIC ACID, VENOUS   CBC WITH  AUTO DIFFERENTIAL   RENAL FUNCTION PANEL   MAGNESIUM   BLOOD GAS VENOUS FULL PANEL   CREATINE KINASE        Imaging:   US renal complete   Final Result   Unremarkable renal ultrasound.        I personally reviewed the images/study and I agree with the findings   as stated by Lola Brand DO, PGY-3. This study was interpreted   at [place], [place].        MACRO:   None             Signed by: Tr Barfield 1/6/2025 3:49 PM   Dictation workstation:   [id number]      Transthoracic Echo (TTE) Complete   Final Result      CT head W O contrast trauma protocol   Final Result   1. No acute intracranial abnormality.   2. No hemodynamically significant intracranial or extracranial   stenosis, occlusion, or aneurysm.   3. Age-indeterminate mildly displaced fracture of the left maxilla at   the base of the left 1st incisor.   4. Age-indeterminate mildly displaced nasal bone fractures.   5. Odontogenic disease with multiple periapical lucencies. Recommend   correlation with physical exam.   6. The orogastric tube is coiled in the oropharynx.        I personally reviewed the images/study and I agree with the findings   as stated by Dr. Zachary Alfaro. This study was interpreted at   [place], [place].        MACRO:   None.        Signed by: Iris Dunaway 1/5/2025 11:47 PM   Dictation workstation:   [id number]      CT cervical spine wo IV contrast   Final Result   No acute fracture or traumatic malalignment of the cervical spine.        I personally reviewed the images/study and I agree with the findings   as stated by Dr. Zachary Alfaro. This study was interpreted at   [place], [place].        MACRO:   None.        Signed by: Iris Dunaway 1/5/2025 11:49 PM   Dictation workstation:   [id number]      CT thoracic spine wo IV contrast   Final Result   CT CHEST/ABDOMEN/PELVIS:   No  acute traumatic injury.        CT THORACIC AND LUMBAR SPINE:   No acute fracture or traumatic malalignment.        I personally reviewed the images/study and I agree with the findings   as stated by Dr. Zachary Alfaro. This study was interpreted at   [place], [place].        MACRO:   None.        Signed by: Iris Dunaway 1/6/2025 12:02 AM   Dictation workstation:   [id number]      CT lumbar spine wo IV contrast   Final Result   CT CHEST/ABDOMEN/PELVIS:   No acute traumatic injury.        CT THORACIC AND LUMBAR SPINE:   No acute fracture or traumatic malalignment.        I personally reviewed the images/study and I agree with the findings   as stated by Dr. Zachary Alfaro. This study was interpreted at   [place], [place].        MACRO:   None.        Signed by: Iris Dunaway 1/6/2025 12:02 AM   Dictation workstation:   [id number]      CT chest abdomen pelvis w IV contrast   Final Result   CT CHEST/ABDOMEN/PELVIS:   No acute traumatic injury.        CT THORACIC AND LUMBAR SPINE:   No acute fracture or traumatic malalignment.        I personally reviewed the images/study and I agree with the findings   as stated by Dr. Zachary Alfaro. This study was interpreted at   [place], [place].        MACRO:   None.        Signed by: Iris Dunaway 1/6/2025 12:02 AM   Dictation workstation:   [id number]      CT angio head and neck w and wo IV contrast   Final Result   1. No acute intracranial abnormality.   2. No hemodynamically significant intracranial or extracranial   stenosis, occlusion, or aneurysm.   3. Age-indeterminate mildly displaced fracture of the left maxilla at   the base of the left 1st incisor.   4. Age-indeterminate mildly displaced nasal bone fractures.   5. Odontogenic disease with multiple periapical lucencies. Recommend   correlation with physical exam.   6. The orogastric tube  is coiled in the oropharynx.        I personally reviewed the images/study and I agree with the findings   as stated by Dr. Zachary Alfaro. This study was interpreted at   [place], [place].        MACRO:   None.        Signed by: Iris Dunaway 1/5/2025 11:47 PM   Dictation workstation:   [id number]      XR chest 1 view   Final Result   1.  No acute cardiopulmonary process.   2. Medical devices as above.        I personally reviewed the images/study and I agree with the findings   as stated by Dr. Zachary Alfaro. This study was interpreted at   [place], [place].        MACRO:   None        Signed by: Iris Dunaway 1/6/2025 12:03 AM   Dictation workstation:   [id number]      XR pelvis 1-2 views   Final Result   Normal radiograph of the pelvis.        I personally reviewed the images/study and I agree with the findings   as stated by Dr. Zachary Alfaro. This study was interpreted at   [place], [place].        MACRO:   None        Signed by: Iris Dunaway 1/6/2025 12:04 AM   Dictation workstation:   [id number]      MR cervical spine wo IV contrast    (Results Pending)   XR foot 1-2 views bilateral    (Results Pending)   XR bilateral femur 1 view    (Results Pending)   XR tibia fibula bilateral 2 views    (Results Pending)   XR hand 1-2 views bilateral    (Results Pending)   XR forearm bilateral minium 2 views    (Results Pending)   XR humerus bilateral    (Results Pending)        EKG:  None    MDM:  This is a 44-year-old male who presents to the emergency department for unresponsiveness, possible drug overdose and a presumed fall.  Upon arrival he is being bagged by EMS, he is not adequately breathing spontaneously.  On initial evaluation he does have pulses present however is not responsive to painful stimuli.  Due to obvious blood on his face and laceration near his eye without a  complete story, a full trauma is activated.  While we continue with our primary assessment the patient lost pulses and CPR was initiated immediately.  The patient was successfully intubated, given 1 mg of epinephrine and regained spontaneous circulation.  He continued to be unresponsive to painful stimuli.  No other signs of external trauma aside from the laceration of the right eye.  A c-collar was placed for precautions.  Access established and labs obtained.  Patient sedated with ketamine initially followed by propofol and fentanyl for ongoing sedation.  Patient taken to the CT scanner for pan scan imaging.  CT of the head does not reveal any intracranial abnormalities including hemorrhage or fracture.  CT imaging of the CT and L-spine are unremarkable.  CT imaging of the chest abdomen and pelvis did not reveal any acute traumatic injuries or other pathology.  There are age-indeterminate mildly displaced fractures of the left maxilla and age-indeterminate nasal bone fractures identified.  Without any significant other traumatic injuries trauma surgery has signed off after laceration is repaired.  Initial blood gas shows acidosis with CO2 retention and elevated lactate consistent with cardiac arrest.  Given the significant elevation in CO2 and acidosis, do feel this is respiratory in nature.  Other lab work shows no significant electrolyte abnormalities however creatinine is 2.22 which is significantly elevated compared to prior labs indicating an acute kidney injury.  Liver function appears normal.  Mild leukocytosis of 13.2 without anemia on CBC.  Initial troponin 25 also supporting noncardiac cause of arrest.  Patient did become mildly hypotensive requiring small amount of Levophed at 0.03.  This was thought to be secondary to sedation requirements and those are adjusted appropriately.  Given overall workup at this time it is felt the patient's arrest was secondary to respiratory causes.  Urine drug screen  returned positive for PCP and cannabinoids only.  Discussed the patient with the medical ICU who accepts the patient for admission, further evaluation and management.  Sedation and vasopressor requirements were titrated appropriately.  Patient overall remained stable though critically ill and transferred to the medical intensive care.      ED Course:  Diagnoses as of 01/06/25 2000   Cardiac arrest   Acute hypoxic respiratory failure (Multi)   Laceration of right eyebrow, initial encounter   Phencyclidine (PCP) intoxication, with delirium   ADELINA (acute kidney injury) (CMS-HCC)       Independent Result Review and Interpretation: Relevant laboratory and radiographic results were reviewed and independently interpreted by myself.  As necessary, they are commented on in the ED Course.    Social Determinants Limiting Care:  None identified      Patient seen by and discussed with the attending emergency medicine physician.       Disposition    Admit-MICU    David Villareal DO   Emergency Medicine PGY-3  [facility]      Procedures      Procedures ? SmartLinks last updated 1/6/2025 8:00 PM        David Villareal DO  Resident  01/06/25 2000

## 2025-01-07 ENCOUNTER — APPOINTMENT (OUTPATIENT)
Dept: RADIOLOGY | Facility: HOSPITAL | Age: 45
End: 2025-01-07
Payer: COMMERCIAL

## 2025-01-07 ENCOUNTER — APPOINTMENT (OUTPATIENT)
Dept: CARDIOLOGY | Facility: HOSPITAL | Age: 45
End: 2025-01-07
Payer: COMMERCIAL

## 2025-01-07 LAB
ALBUMIN SERPL BCP-MCNC: 3.3 G/DL (ref 3.4–5)
ALBUMIN SERPL BCP-MCNC: 3.5 G/DL (ref 3.4–5)
ANION GAP BLDV CALCULATED.4IONS-SCNC: 12 MMOL/L (ref 10–25)
ANION GAP SERPL CALC-SCNC: 12 MMOL/L (ref 10–20)
ANION GAP SERPL CALC-SCNC: 14 MMOL/L (ref 10–20)
BASE EXCESS BLDV CALC-SCNC: 0.4 MMOL/L (ref -2–3)
BASOPHILS # BLD AUTO: 0.02 X10*3/UL (ref 0–0.1)
BASOPHILS NFR BLD AUTO: 0.2 %
BODY TEMPERATURE: 37 DEGREES CELSIUS
BUN SERPL-MCNC: 19 MG/DL (ref 6–23)
BUN SERPL-MCNC: 20 MG/DL (ref 6–23)
CA-I BLDV-SCNC: 1.23 MMOL/L (ref 1.1–1.33)
CALCIUM SERPL-MCNC: 8.7 MG/DL (ref 8.6–10.6)
CALCIUM SERPL-MCNC: 8.9 MG/DL (ref 8.6–10.6)
CHLORIDE BLDV-SCNC: 106 MMOL/L (ref 98–107)
CHLORIDE SERPL-SCNC: 106 MMOL/L (ref 98–107)
CHLORIDE SERPL-SCNC: 108 MMOL/L (ref 98–107)
CK SERPL-CCNC: 2280 U/L (ref 0–325)
CO2 SERPL-SCNC: 22 MMOL/L (ref 21–32)
CO2 SERPL-SCNC: 26 MMOL/L (ref 21–32)
CREAT SERPL-MCNC: 1.17 MG/DL (ref 0.5–1.3)
CREAT SERPL-MCNC: 1.21 MG/DL (ref 0.5–1.3)
EGFRCR SERPLBLD CKD-EPI 2021: 76 ML/MIN/1.73M*2
EGFRCR SERPLBLD CKD-EPI 2021: 79 ML/MIN/1.73M*2
EOSINOPHIL # BLD AUTO: 0.04 X10*3/UL (ref 0–0.7)
EOSINOPHIL NFR BLD AUTO: 0.5 %
ERYTHROCYTE [DISTWIDTH] IN BLOOD BY AUTOMATED COUNT: 13.3 % (ref 11.5–14.5)
GLUCOSE BLDV-MCNC: 78 MG/DL (ref 74–99)
GLUCOSE SERPL-MCNC: 127 MG/DL (ref 74–99)
GLUCOSE SERPL-MCNC: 84 MG/DL (ref 74–99)
HCO3 BLDV-SCNC: 22.9 MMOL/L (ref 22–26)
HCT VFR BLD AUTO: 34.4 % (ref 41–52)
HCT VFR BLD EST: 40 % (ref 41–52)
HGB BLD-MCNC: 12.4 G/DL (ref 13.5–17.5)
HGB BLDV-MCNC: 13.3 G/DL (ref 13.5–17.5)
IMM GRANULOCYTES # BLD AUTO: 0.04 X10*3/UL (ref 0–0.7)
IMM GRANULOCYTES NFR BLD AUTO: 0.5 % (ref 0–0.9)
INHALED O2 CONCENTRATION: 30 %
LACTATE BLDV-SCNC: 0.9 MMOL/L (ref 0.4–2)
LYMPHOCYTES # BLD AUTO: 2.41 X10*3/UL (ref 1.2–4.8)
LYMPHOCYTES NFR BLD AUTO: 27.7 %
MAGNESIUM SERPL-MCNC: 1.8 MG/DL (ref 1.6–2.4)
MCH RBC QN AUTO: 31.6 PG (ref 26–34)
MCHC RBC AUTO-ENTMCNC: 36 G/DL (ref 32–36)
MCV RBC AUTO: 88 FL (ref 80–100)
MONOCYTES # BLD AUTO: 0.72 X10*3/UL (ref 0.1–1)
MONOCYTES NFR BLD AUTO: 8.3 %
NEUTROPHILS # BLD AUTO: 5.48 X10*3/UL (ref 1.2–7.7)
NEUTROPHILS NFR BLD AUTO: 62.8 %
NRBC BLD-RTO: 0 /100 WBCS (ref 0–0)
OXYHGB MFR BLDV: 93.4 % (ref 45–75)
PCO2 BLDV: 30 MM HG (ref 41–51)
PH BLDV: 7.49 PH (ref 7.33–7.43)
PHOSPHATE SERPL-MCNC: 2 MG/DL (ref 2.5–4.9)
PHOSPHATE SERPL-MCNC: 2 MG/DL (ref 2.5–4.9)
PLATELET # BLD AUTO: 144 X10*3/UL (ref 150–450)
PO2 BLDV: 70 MM HG (ref 35–45)
POTASSIUM BLDV-SCNC: 4 MMOL/L (ref 3.5–5.3)
POTASSIUM SERPL-SCNC: 3.9 MMOL/L (ref 3.5–5.3)
POTASSIUM SERPL-SCNC: 4 MMOL/L (ref 3.5–5.3)
RBC # BLD AUTO: 3.93 X10*6/UL (ref 4.5–5.9)
SAO2 % BLDV: 96 % (ref 45–75)
SODIUM BLDV-SCNC: 137 MMOL/L (ref 136–145)
SODIUM SERPL-SCNC: 140 MMOL/L (ref 136–145)
SODIUM SERPL-SCNC: 140 MMOL/L (ref 136–145)
WBC # BLD AUTO: 8.7 X10*3/UL (ref 4.4–11.3)

## 2025-01-07 PROCEDURE — 2500000004 HC RX 250 GENERAL PHARMACY W/ HCPCS (ALT 636 FOR OP/ED): Mod: SE | Performed by: INTERNAL MEDICINE

## 2025-01-07 PROCEDURE — 2500000001 HC RX 250 WO HCPCS SELF ADMINISTERED DRUGS (ALT 637 FOR MEDICARE OP): Mod: SE | Performed by: INTERNAL MEDICINE

## 2025-01-07 PROCEDURE — 97162 PT EVAL MOD COMPLEX 30 MIN: CPT | Mod: GP

## 2025-01-07 PROCEDURE — 97166 OT EVAL MOD COMPLEX 45 MIN: CPT | Mod: GO

## 2025-01-07 PROCEDURE — 72141 MRI NECK SPINE W/O DYE: CPT

## 2025-01-07 PROCEDURE — 2500000004 HC RX 250 GENERAL PHARMACY W/ HCPCS (ALT 636 FOR OP/ED): Mod: SE

## 2025-01-07 PROCEDURE — 97530 THERAPEUTIC ACTIVITIES: CPT | Mod: GO

## 2025-01-07 PROCEDURE — 99233 SBSQ HOSP IP/OBS HIGH 50: CPT

## 2025-01-07 PROCEDURE — 97530 THERAPEUTIC ACTIVITIES: CPT | Mod: GP

## 2025-01-07 PROCEDURE — 84132 ASSAY OF SERUM POTASSIUM: CPT | Performed by: INTERNAL MEDICINE

## 2025-01-07 PROCEDURE — 1100000001 HC PRIVATE ROOM DAILY

## 2025-01-07 PROCEDURE — 2500000005 HC RX 250 GENERAL PHARMACY W/O HCPCS: Mod: SE

## 2025-01-07 PROCEDURE — 82550 ASSAY OF CK (CPK): CPT

## 2025-01-07 PROCEDURE — 82435 ASSAY OF BLOOD CHLORIDE: CPT | Performed by: INTERNAL MEDICINE

## 2025-01-07 PROCEDURE — 82947 ASSAY GLUCOSE BLOOD QUANT: CPT

## 2025-01-07 PROCEDURE — 36415 COLL VENOUS BLD VENIPUNCTURE: CPT

## 2025-01-07 PROCEDURE — 84132 ASSAY OF SERUM POTASSIUM: CPT

## 2025-01-07 PROCEDURE — 2500000002 HC RX 250 W HCPCS SELF ADMINISTERED DRUGS (ALT 637 FOR MEDICARE OP, ALT 636 FOR OP/ED): Mod: SE | Performed by: INTERNAL MEDICINE

## 2025-01-07 PROCEDURE — 83735 ASSAY OF MAGNESIUM: CPT

## 2025-01-07 PROCEDURE — 2500000001 HC RX 250 WO HCPCS SELF ADMINISTERED DRUGS (ALT 637 FOR MEDICARE OP): Mod: SE

## 2025-01-07 PROCEDURE — 80069 RENAL FUNCTION PANEL: CPT

## 2025-01-07 PROCEDURE — 85025 COMPLETE CBC W/AUTO DIFF WBC: CPT

## 2025-01-07 PROCEDURE — 71045 X-RAY EXAM CHEST 1 VIEW: CPT

## 2025-01-07 PROCEDURE — 93010 ELECTROCARDIOGRAM REPORT: CPT | Performed by: INTERNAL MEDICINE

## 2025-01-07 PROCEDURE — 94003 VENT MGMT INPAT SUBQ DAY: CPT

## 2025-01-07 PROCEDURE — 99291 CRITICAL CARE FIRST HOUR: CPT

## 2025-01-07 PROCEDURE — 93005 ELECTROCARDIOGRAM TRACING: CPT

## 2025-01-07 PROCEDURE — 71045 X-RAY EXAM CHEST 1 VIEW: CPT | Performed by: STUDENT IN AN ORGANIZED HEALTH CARE EDUCATION/TRAINING PROGRAM

## 2025-01-07 PROCEDURE — 36415 COLL VENOUS BLD VENIPUNCTURE: CPT | Performed by: INTERNAL MEDICINE

## 2025-01-07 RX ORDER — HYDRALAZINE HYDROCHLORIDE 25 MG/1
25 TABLET, FILM COATED ORAL EVERY 6 HOURS PRN
Status: DISCONTINUED | OUTPATIENT
Start: 2025-01-07 | End: 2025-01-07

## 2025-01-07 RX ORDER — HYDROXYZINE HYDROCHLORIDE 25 MG/1
25 TABLET, FILM COATED ORAL EVERY 6 HOURS PRN
Status: DISCONTINUED | OUTPATIENT
Start: 2025-01-07 | End: 2025-01-08

## 2025-01-07 RX ORDER — LIDOCAINE 560 MG/1
1 PATCH PERCUTANEOUS; TOPICAL; TRANSDERMAL EVERY 24 HOURS
Status: DISCONTINUED | OUTPATIENT
Start: 2025-01-07 | End: 2025-01-08

## 2025-01-07 RX ORDER — CLONIDINE HYDROCHLORIDE 0.1 MG/1
0.1 TABLET ORAL EVERY 6 HOURS PRN
Status: DISCONTINUED | OUTPATIENT
Start: 2025-01-07 | End: 2025-01-10 | Stop reason: HOSPADM

## 2025-01-07 RX ORDER — OXYCODONE AND ACETAMINOPHEN 5; 325 MG/1; MG/1
1 TABLET ORAL EVERY 8 HOURS PRN
Status: DISCONTINUED | OUTPATIENT
Start: 2025-01-07 | End: 2025-01-10 | Stop reason: HOSPADM

## 2025-01-07 RX ORDER — SODIUM CHLORIDE, SODIUM LACTATE, POTASSIUM CHLORIDE, CALCIUM CHLORIDE 600; 310; 30; 20 MG/100ML; MG/100ML; MG/100ML; MG/100ML
125 INJECTION, SOLUTION INTRAVENOUS CONTINUOUS
Status: DISCONTINUED | OUTPATIENT
Start: 2025-01-07 | End: 2025-01-08

## 2025-01-07 RX ORDER — ACETAMINOPHEN 325 MG/1
650 TABLET ORAL EVERY 6 HOURS
Status: DISCONTINUED | OUTPATIENT
Start: 2025-01-07 | End: 2025-01-10 | Stop reason: HOSPADM

## 2025-01-07 RX ADMIN — ACETAMINOPHEN 650 MG: 325 TABLET, FILM COATED ORAL at 10:47

## 2025-01-07 RX ADMIN — SODIUM CHLORIDE, POTASSIUM CHLORIDE, SODIUM LACTATE AND CALCIUM CHLORIDE 150 ML/HR: 600; 310; 30; 20 INJECTION, SOLUTION INTRAVENOUS at 14:37

## 2025-01-07 RX ADMIN — ACETAMINOPHEN 650 MG: 325 TABLET, FILM COATED ORAL at 22:07

## 2025-01-07 RX ADMIN — Medication 4 L/MIN: at 08:00

## 2025-01-07 RX ADMIN — SODIUM CHLORIDE, POTASSIUM CHLORIDE, SODIUM LACTATE AND CALCIUM CHLORIDE 150 ML/HR: 600; 310; 30; 20 INJECTION, SOLUTION INTRAVENOUS at 20:08

## 2025-01-07 RX ADMIN — LIDOCAINE 1 PATCH: 4 PATCH TOPICAL at 11:12

## 2025-01-07 RX ADMIN — OXYCODONE HYDROCHLORIDE AND ACETAMINOPHEN 1 TABLET: 5; 325 TABLET ORAL at 11:13

## 2025-01-07 RX ADMIN — TRAZODONE HYDROCHLORIDE 100 MG: 50 TABLET ORAL at 20:07

## 2025-01-07 RX ADMIN — ROSUVASTATIN CALCIUM 20 MG: 20 TABLET, FILM COATED ORAL at 22:05

## 2025-01-07 RX ADMIN — ENOXAPARIN SODIUM 40 MG: 100 INJECTION SUBCUTANEOUS at 08:23

## 2025-01-07 RX ADMIN — BENZTROPINE MESYLATE 2 MG: 2 TABLET ORAL at 22:05

## 2025-01-07 RX ADMIN — BENZTROPINE MESYLATE 2 MG: 2 TABLET ORAL at 10:47

## 2025-01-07 RX ADMIN — SODIUM CHLORIDE, POTASSIUM CHLORIDE, SODIUM LACTATE AND CALCIUM CHLORIDE 150 ML/HR: 600; 310; 30; 20 INJECTION, SOLUTION INTRAVENOUS at 08:27

## 2025-01-07 RX ADMIN — PANTOPRAZOLE SODIUM 40 MG: 40 INJECTION, POWDER, LYOPHILIZED, FOR SOLUTION INTRAVENOUS at 08:23

## 2025-01-07 RX ADMIN — POTASSIUM PHOSPHATE, MONOBASIC 500 MG: 500 TABLET, SOLUBLE ORAL at 10:47

## 2025-01-07 RX ADMIN — OLANZAPINE 30 MG: 20 TABLET, FILM COATED ORAL at 22:05

## 2025-01-07 RX ADMIN — FLUOXETINE HYDROCHLORIDE 20 MG: 20 CAPSULE ORAL at 10:48

## 2025-01-07 RX ADMIN — HYDROXYZINE HYDROCHLORIDE 25 MG: 25 TABLET ORAL at 20:07

## 2025-01-07 RX ADMIN — CLONIDINE HYDROCHLORIDE 0.1 MG: 0.1 TABLET ORAL at 20:07

## 2025-01-07 RX ADMIN — OXYCODONE HYDROCHLORIDE AND ACETAMINOPHEN 1 TABLET: 5; 325 TABLET ORAL at 20:07

## 2025-01-07 RX ADMIN — ACETAMINOPHEN 650 MG: 325 TABLET, FILM COATED ORAL at 16:09

## 2025-01-07 SDOH — ECONOMIC STABILITY: HOUSING INSECURITY: AT ANY TIME IN THE PAST 12 MONTHS, WERE YOU HOMELESS OR LIVING IN A SHELTER (INCLUDING NOW)?: NO

## 2025-01-07 SDOH — ECONOMIC STABILITY: HOUSING INSECURITY: IN THE LAST 12 MONTHS, WAS THERE A TIME WHEN YOU WERE NOT ABLE TO PAY THE MORTGAGE OR RENT ON TIME?: NO

## 2025-01-07 SDOH — SOCIAL STABILITY: SOCIAL INSECURITY: WITHIN THE LAST YEAR, HAVE YOU BEEN HUMILIATED OR EMOTIONALLY ABUSED IN OTHER WAYS BY YOUR PARTNER OR EX-PARTNER?: NO

## 2025-01-07 SDOH — SOCIAL STABILITY: SOCIAL INSECURITY
WITHIN THE LAST YEAR, HAVE YOU BEEN RAPED OR FORCED TO HAVE ANY KIND OF SEXUAL ACTIVITY BY YOUR PARTNER OR EX-PARTNER?: NO

## 2025-01-07 SDOH — ECONOMIC STABILITY: INCOME INSECURITY: IN THE PAST 12 MONTHS HAS THE ELECTRIC, GAS, OIL, OR WATER COMPANY THREATENED TO SHUT OFF SERVICES IN YOUR HOME?: NO

## 2025-01-07 SDOH — SOCIAL STABILITY: SOCIAL INSECURITY
WITHIN THE LAST YEAR, HAVE YOU BEEN KICKED, HIT, SLAPPED, OR OTHERWISE PHYSICALLY HURT BY YOUR PARTNER OR EX-PARTNER?: NO

## 2025-01-07 SDOH — ECONOMIC STABILITY: TRANSPORTATION INSECURITY: IN THE PAST 12 MONTHS, HAS LACK OF TRANSPORTATION KEPT YOU FROM MEDICAL APPOINTMENTS OR FROM GETTING MEDICATIONS?: NO

## 2025-01-07 SDOH — SOCIAL STABILITY: SOCIAL INSECURITY: WITHIN THE LAST YEAR, HAVE YOU BEEN AFRAID OF YOUR PARTNER OR EX-PARTNER?: NO

## 2025-01-07 SDOH — ECONOMIC STABILITY: HOUSING INSECURITY: IN THE PAST 12 MONTHS, HOW MANY TIMES HAVE YOU MOVED WHERE YOU WERE LIVING?: 0

## 2025-01-07 SDOH — ECONOMIC STABILITY: FOOD INSECURITY: WITHIN THE PAST 12 MONTHS, YOU WORRIED THAT YOUR FOOD WOULD RUN OUT BEFORE YOU GOT THE MONEY TO BUY MORE.: NEVER TRUE

## 2025-01-07 SDOH — ECONOMIC STABILITY: FOOD INSECURITY: HOW HARD IS IT FOR YOU TO PAY FOR THE VERY BASICS LIKE FOOD, HOUSING, MEDICAL CARE, AND HEATING?: NOT HARD AT ALL

## 2025-01-07 SDOH — ECONOMIC STABILITY: FOOD INSECURITY: WITHIN THE PAST 12 MONTHS, THE FOOD YOU BOUGHT JUST DIDN'T LAST AND YOU DIDN'T HAVE MONEY TO GET MORE.: NEVER TRUE

## 2025-01-07 ASSESSMENT — PAIN SCALES - GENERAL
PAINLEVEL_OUTOF10: 10 - WORST POSSIBLE PAIN
PAINLEVEL_OUTOF10: 3

## 2025-01-07 ASSESSMENT — COGNITIVE AND FUNCTIONAL STATUS - GENERAL
WALKING IN HOSPITAL ROOM: A LOT
DAILY ACTIVITIY SCORE: 16
HELP NEEDED FOR BATHING: A LOT
DRESSING REGULAR LOWER BODY CLOTHING: A LOT
MOBILITY SCORE: 15
MOVING TO AND FROM BED TO CHAIR: A LOT
MOBILITY SCORE: 24
TOILETING: A LOT
TURNING FROM BACK TO SIDE WHILE IN FLAT BAD: A LITTLE
DAILY ACTIVITIY SCORE: 24
MOVING FROM LYING ON BACK TO SITTING ON SIDE OF FLAT BED WITH BEDRAILS: A LITTLE
STANDING UP FROM CHAIR USING ARMS: A LITTLE
CLIMB 3 TO 5 STEPS WITH RAILING: A LOT
PERSONAL GROOMING: A LITTLE
DRESSING REGULAR UPPER BODY CLOTHING: A LITTLE

## 2025-01-07 ASSESSMENT — PAIN - FUNCTIONAL ASSESSMENT
PAIN_FUNCTIONAL_ASSESSMENT: 0-10

## 2025-01-07 ASSESSMENT — ACTIVITIES OF DAILY LIVING (ADL)
LACK_OF_TRANSPORTATION: NO
BATHING_ASSISTANCE: MODERATE
ADL_ASSISTANCE: INDEPENDENT
ADL_ASSISTANCE: INDEPENDENT

## 2025-01-07 ASSESSMENT — PAIN DESCRIPTION - LOCATION
LOCATION: TEETH
LOCATION: BACK
LOCATION: BACK

## 2025-01-07 ASSESSMENT — PAIN DESCRIPTION - ORIENTATION: ORIENTATION: OTHER (COMMENT)

## 2025-01-07 NOTE — PROGRESS NOTES
Floor Readiness Note       I, personally, evaluated Shamar Cunningham prior to transfer to the floor, including reviewing all current laboratory and imaging studies. The patient remains appropriate for transfer to the floor. Bedside nurse and respiratory therapy are also in agreement of patient's readiness for the floor.     Brief summary:  Shamar Cunningham is a 44 y.o. male who was admitted to the MICU on 1/6/2025 s/p cardiac arrest . They have been treated with brief course of pressors and intubation     Updated focused Physical Exam:  General: Looks well. AOX3.  Chest: EBAE. Normal s1,s2  Abdomen: soft and lax. Non tender.  Extremities: No Swelling or LL edema    Current Vital Signs:  Heart Rate: 95 (01/07/25 1300 : User, System Default)  BP: 118/67 (01/07/25 1200 : User, System Default)  Temp: 37.5 °C (99.5 °F) (01/07/25 1200 : Katie Sultana, PCNA)  Resp: 23 (01/07/25 1300 : User, System Default)  SpO2: 93 % (01/07/25 1300 : User, System Default)      Accepting team,  Asia CASTRO , received verbal sign out and the Provider Care team/Attending Will be updated. Bedside nurse will now call accepting nurse for report and patient will be transferred to [place] .    Cindy Paz MD

## 2025-01-07 NOTE — CONSULTS
Nutrition Note:     Reason for Assessment: Admission nursing screening score of 2    This service consulted for above reason.  Chart reviewed and events noted.  He was intubated yesterday but able to be extubated and is now on a PO diet.  Met with patient.  He reports a good appetite and intake PTA.  No issues with skipping meals or eating less.  Thinks his weight is stable around 91kg-- EMR has him at 85.8kg now.  He was looking at a menu and wanted help putting in a lunch order.  Ordered multiple entrees and sides as he was hungry.  Based on speaking with patient and EMR weight history below, no nutrition concerns at this time.  No need for full assessment or interventions from RDN at this time.       Wt Readings from Last 6 Encounters:   01/06/25 85.8 kg (189 lb 2.5 oz)   11/16/24 86.2 kg (190 lb)   07/26/24 81.6 kg (180 lb)   07/08/24 81.6 kg (180 lb)   06/18/24 81.6 kg (180 lb)   05/17/24 90.3 kg (199 lb)       Dietary Orders (From admission, onward)       Start     Ordered    01/07/25 1059  Adult diet Regular  Diet effective now        Question:  Diet type  Answer:  Regular    01/07/25 1058    01/06/25 0321  May Not Participate in Room Service  ( ROOM SERVICE MAY NOT PARTICIPATE)  Once        Question:  .  Answer:  Yes    01/06/25 0320                        Time Spent (min): 15 minutes

## 2025-01-07 NOTE — PROGRESS NOTES
"Medical Intensive Care - Daily Progress Note   Subjective    Shamar Cunningham is a 44 y.o. male patient admitted on 1/5/2025 with following ICU needs: [Ventailation s/p arrest and brief pressor requirement]    Interval history:  Patient had multiple extremity X-rays that showed punctate opacities in the soft tissue with no acute fractures. MRI neck was negative for acute changes. US renal was unremarkable. Has been extubated 1/7 with no acute complications.    Meds    Scheduled medications  benztropine, 2 mg, oral, BID  enoxaparin, 40 mg, subcutaneous, q24h  pantoprazole, 40 mg, oral, Daily before breakfast   Or  esomeprazole, 40 mg, nasoduodenal tube, Daily before breakfast   Or  pantoprazole, 40 mg, intravenous, Daily before breakfast  FLUoxetine, 20 mg, oral, Daily  OLANZapine, 30 mg, oral, Nightly  rosuvastatin, 20 mg, oral, Nightly  traZODone, 100 mg, oral, Nightly      Continuous medications  dexmedeTOMIDine, 0.2-1.5 mcg/kg/hr, Last Rate: 0.399 mcg/kg/hr (01/06/25 2325)  fentaNYL, 0-300 mcg/hr, Last Rate: 100 mcg/hr (01/06/25 2325)  lactated Ringer's, 150 mL/hr, Last Rate: 150 mL/hr (01/06/25 2325)  norepinephrine, 0.01-0.2 mcg/kg/min, Last Rate: Stopped (01/06/25 0313)  propofol, 0-50 mcg/kg/min, Last Rate: Stopped (01/06/25 1045)      PRN medications  PRN medications: fentaNYL, oxygen     Objective    Blood pressure 128/79, pulse 54, temperature 36.5 °C (97.7 °F), temperature source Temporal, resp. rate (!) 5, height 1.803 m (5' 10.98\"), weight 85.8 kg (189 lb 2.5 oz), SpO2 100%.     Temp  Min: 35.6 °C (96.1 °F)  Max: 37.5 °C (99.5 °F)  Pulse  Min: 52  Max: 63  BP  Min: 83/59  Max: 132/90  Resp  Min: 5  Max: 21  SpO2  Min: 95 %  Max: 100 %    Physical Exam  Constitutional:       Comments: Patient complains of generalized severe pain.    HENT:      Head:      Comments: Bruising of the face with laceration of right upper eyelid. Patient has a chipped tooth   Cardiovascular:      Rate and Rhythm: Normal rate and " regular rhythm.      Pulses: Normal pulses.      Heart sounds: Normal heart sounds.   Pulmonary:      Effort: Pulmonary effort is normal. No respiratory distress.      Breath sounds: Normal breath sounds. No stridor.   Abdominal:      General: Abdomen is flat.      Palpations: Abdomen is soft.      Tenderness: There is no guarding.   Skin:     General: Skin is warm.            Intake/Output Summary (Last 24 hours) at 1/7/2025 0648  Last data filed at 1/7/2025 0200  Gross per 24 hour   Intake 2017.71 ml   Output 580 ml   Net 1437.71 ml     Net IO Since Admission: 1,890.72 mL [01/07/25 0648]     Labs:   Results from last 72 hours   Lab Units 01/07/25  0504 01/06/25  0427 01/05/25  2146   HEMOGLOBIN g/dL 12.4* 13.9 15.0   HEMATOCRIT % 34.4* 38.3* 44.0   WBC AUTO x10*3/uL 8.7 10.4 13.2*   PLATELETS AUTO x10*3/uL 144* 173 174   NEUTROS PCT AUTO % 62.8 73.4 54.6   LYMPHS PCT AUTO % 27.7 19.2 37.9   MONOS PCT AUTO % 8.3 6.8 6.6   EOS PCT AUTO % 0.5 0.0 0.1      Results from last 72 hours   Lab Units 01/07/25  0504 01/06/25  1509 01/06/25  0427   SODIUM mmol/L 140 140 140   POTASSIUM mmol/L 4.0 4.1 4.5   CHLORIDE mmol/L 108* 107 105   CO2 mmol/L 22 27 27   BUN mg/dL 20 22 22   CREATININE mg/dL 1.17 1.40* 1.62*   GLUCOSE mg/dL 84 91 98   CALCIUM mg/dL 8.7 8.8 9.2   ANION GAP mmol/L 14 10 13   EGFR mL/min/1.73m*2 79 64 53*   PHOSPHORUS mg/dL 2.0* 2.4* 4.1        Assessment and Plan     Assessment:  Shamar Cunningham is a 43 y/o M with PMH including schizoaffective disorder, substance use disorder (PCP) who presented to [facility] ED on 1/5 after being found unresponsive by EMS, he was intubated in trauma bay, lost pulses, with ROSC achieved after 1 round of CPR. He was admitted to [facility] MICU on 1/6 for further management post arrest. Had brief pressor requirement. Extubated 1/7 with no acute complications.    Summary for 01/07/25:  Extubated with no acute complications  Complains of generalized neck pain.  Mri was unremarkable for  acute changes.  Echo was positive for decreased EF. Likely cardiac stunning. Will repeat TTE on 1/8.  Renal US negative  For transfer today given stability.  Will continue maintenance fluids until CK<1000.      Plan:  NEUROLOGY/PSYCH:  #Encephalopathy  #Hx schizoaffective disorder  -found down by EMS, intubated in ED 1/5  -likely toxic in setting of PCP use with + UDS on presentation  -potential component of metabolic in setting of hypercapnia with pCO2 on presentation of 82 on initial VBG  -CT head 1/5 without acute processes  -currently sedated on propofol and fentanyl  -per chart review, on fluoxetine 20 daily and olanzapine 30 nightly, trazodone 100 mg nightly  Plan:  - Extubated and passed swallow assessment.  - Will restart home zyprexa and fluoxetine      CARDIOVASCULAR:  #Shock, likely hypovolemic vs cardiogenic  #s/p cardiac arrest  -no known cardiac history  -Troponin 25 on presentation  -Lactate 11.2 on presentation, downtrended to 3.9 on VBG on 1/5 at 2253  -lost pulses in ED shortly after presentation and intubation, ROSC achieved after 1 round with  1 dose of epinephrine on 1/5  -Was on norepinephrine 0.03 on arrival to MICU. Now completely weaned off. S/P 1L.  Plan:  - s/p 1L LR  - On maintenance fluids given high CK. (Goal CK<1000)  - Echo showed decreased ejection fraction. Likely in the setting of cardiac stunning.  - Plan to repeat ECHO on 1/8.    PULMONARY:  #Intubated  -intubated 1/5 for airway protection  -Vent settings previously  ACVC 20/450/5/30%  -no history of lung disease per chart review  -Extubated on 1/7. Satting well on RA.      RENAL/GENITOURINARY:  #Respiratory acidosis (improved)  #AGMA  -Initial VBG (1/5 at 2137) with pH 7.02 / pCO2 82, lactate 8.1. Bicarb on CMP 19, with anion gap 19.  -Subsequent VBG (1/5 at 2253) with pH 7.24 / pCO2 51, lactate 3.9  -SCr 2.22 on presentation, baseline normal  -unclear etiology, but differential includes prerenal d/t hypovolemia vs potential  rhabdo after being found down vs other etiology  Plan  -repeat VBG this morning  showed normal PH and lactate.  - s/p 1L LR  - On maintenance 150ml/hr. (Until CK<1000)  - CK trending down.    GASTROENTEROLOGY:  No active issues at this time    ENDOCRINOLOGY:  No active issues at this time    HEMATOLOGY:  No active issues at this time    MUSCULOSKELETAL/ SKIN:  #Multiple facial fractures  #Trauma activation  -CT on presentations with multiple subacute facial fractures, had lacerations which trauma sutured 1/5  -No cervical spine fractures noted on CT, however patient unable to respond to questions about plan to clear C-Spine   Plan:  -MRI neck was negative for acute changes. Will remove c-spine precautions.  -Patient has known drug seeking behavior. Will start percocet Q8 prn.    INFECTIOUS DISEASE:  No active issues at this time    ICU Check List     FEN   Fluids: S/P 1L, on maintenance fluids 150ml/hr  Electrolytes: [PRN]  Nutrition: Regular    Prophylaxis:  DVT ppx: [Lovenox]  GI ppx: [None]  Bowel care: [None]  Hardware:  Urethral Catheter Coude 16 Fr. (Active)   Placement Date/Time: 01/05/25 2240   Catheter Type: Coude  Tube Size (Fr.): 16 Fr.  Catheter Balloon Size: 10 mL   Number of days: 1          Code Status: full code (Discussed when extubated)  Surrogate Medical Decision-maker: Ann Cunningham (parent). No available contact numbers are available    Cindy Paz MD   01/07/25 at 6:48 AM

## 2025-01-07 NOTE — PROGRESS NOTES
Occupational Therapy    Evaluation/Treatment    Patient Name: Shamar Cunningham  MRN: [id number]  Department: [facility] MICU  Room: 16/16-A  Today's Date: 01/07/25  Time Calculation  Start Time: 0944  Stop Time: 1011  Time Calculation (min): 27 min     Assessment:  OT Assessment: Patient with deficits in ADLs/functional tasks, will benefit from OT services to address deficits and improve functional independence.  Prognosis: Good  Barriers to Discharge Home: Caregiver assistance, Physical needs  Caregiver Assistance: Patient lives alone and/or does not have reliable caregiver assistance  Physical Needs: Ambulating household distances limited by function/safety, Intermittent ADL assistance needed, High falls risk due to function or environment  End of Session Communication: Bedside nurse  End of Session Patient Position: Up in chair, Alarm on  OT Assessment Results: Decreased ADL status, Decreased upper extremity strength, Decreased upper extremity range of motion, Decreased endurance, Decreased functional mobility, Decreased IADLs  Prognosis: Good  Plan:  Treatment Interventions: ADL retraining, Functional transfer training, UE strengthening/ROM, Endurance training, Patient/family training, Equipment evaluation/education, Neuromuscular reeducation, Compensatory technique education  OT Frequency: 4 times per week  OT Discharge Recommendations: High intensity level of continued care  Equipment Recommended upon Discharge:  (TBD)  OT Recommended Transfer Status: Moderate assist, Assist of 2  OT - OK to Discharge: Yes  Treatment Interventions: ADL retraining, Functional transfer training, UE strengthening/ROM, Endurance training, Patient/family training, Equipment evaluation/education, Neuromuscular reeducation, Compensatory technique education    Subjective   General:   OT Received On: 01/07/25  General  Reason for Referral: presented to [facility] ED on 1/5 after being found unresponsive by EMS, he was intubated in trauma bay,  "lost pulses, with ROSC achieved after 1 round of CPR. He is being admitted to [facility] MICU on 1/6 for further management post arrest. Now extubated. (multiple extremity X-rays that showed punctate opacities in the soft tissue with no acute fractures. MRI neck was negative for acute changes.)  Past Medical History Relevant to Rehab: schizoaffective disorder, substance use disorder (PCP)  Family/Caregiver Present: No  Co-Treatment: PT  Co-Treatment Reason: to maximize patient safety and therapeutic gains 2/2 patient in c-collar with psych hx  Prior to Session Communication: Bedside nurse  Patient Position Received: Bed, 3 rail up, Alarm on  General Comment: patient pleasant, agreeable to therapy, stated \"you're my angels\" and \"I love you guys\" during session   Precautions:  Hearing/Visual Limitations: hearing is WFL  Medical Precautions: Fall precautions, Spinal precautions  Braces Applied: patient wearing Aspen C-collar on arrival    Vital Signs     Vitals Session Pre OT During OT Post OT   Heart Rate 62  69   SpO2 92  95   /64  136/66   MAP (mmHg)        Pain:  Pain Assessment  Pain Assessment: 0-10  0-10 (Numeric) Pain Score:  (patient c/o back pain, (B)LE pain and mouth pain; did not rate; RN notified)    Objective   Cognition:  Overall Cognitive Status: Within Functional Limits  Arousal/Alertness: Appropriate responses to stimuli  Orientation Level:  (oriented x3)  Following Commands:  (follows 90% of one step commands with cues/repetition)        [place] Agitation Sedation Scale  [place] Agitation Sedation Scale (RASS): Alert and calm  Home Living:  Type of Home: Apartment  Lives With: Alone  Home Adaptive Equipment: Cane  Home Layout: One level  Home Access: Level entry  Bathroom Shower/Tub: Tub/shower unit  Bathroom Equipment: None  Prior Function:  Level of [place]: Independent with ADLs and functional transfers, Independent with homemaking with ambulation  ADL Assistance: Independent  Homemaking " Assistance: Independent  Ambulatory Assistance: Independent (uses cane in community)  Vocational:  (on SSI)  Hand Dominance: Right  Prior Function Comments: (-) drive     ADL:  Eating Assistance: Independent  Eating Deficit: Setup (anticipated)  Grooming Assistance: Stand by  Bathing Assistance: Moderate  Bathing Deficit:  (anticipated)  UE Dressing Assistance: Minimal  LE Dressing Assistance: Maximal  LE Dressing Deficit: Don/doff R sock, Don/doff L sock  Toileting Assistance with Device: Moderate  Toileting Deficit:  (anticipated)    Activity Tolerance:  Early Mobility/Exercise Safety Screen: Proceed with mobilization - No exclusion criteria met     Bed Mobility/Transfers: Bed Mobility  Bed Mobility: Yes  Bed Mobility 1  Bed Mobility 1: Supine to sitting  Level of Assistance 1: Minimum assistance, +2, Moderate verbal cues  Bed Mobility Comments 1: HOB elevated; cues for log-roll    Transfers  Transfer: Yes  Transfer 1  Transfer From 1: Sit to, Stand to  Transfer to 1: Sit, Stand  Technique 1: Sit to stand, Stand to sit  Transfer Level of Assistance 1: Moderate assistance, +2, Moderate verbal cues, Arm in arm assistance  Trials/Comments 1: difficulty extending (B)LEs and trunk 2/2 reporting increased pain  Transfers 2  Transfer From 2: Bed to  Transfer to 2: Chair with arms (recliner chair)  Technique 2:  (taking steps)  Transfer Level of Assistance 2: Minimum assistance, +2, Moderate verbal cues, Hand held assistance    Sitting Balance:  Static Sitting Balance  Static Sitting-Level of Assistance: Close supervision  Dynamic Sitting Balance  Dynamic Sitting-Level of Assistance: Contact guard  Standing Balance:  Static Standing Balance  Static Standing-Level of Assistance: Minimum assistance  Dynamic Standing Balance  Dynamic Standing-Level of Assistance: Minimum assistance (x2)     Therapy/Activity: Therapeutic Activity  Therapeutic Activity Performed: Yes  Therapeutic Activity 1: Patient completed additional STS  from EOB with mod A x2 (B) arm in arm assist, improved LE/trunk extension with min cues and (B) knees blocked; patient completed partial STS from chair with mod A x2 (B) arm in arm assist     Vision:Vision - Basic Assessment  Current Vision: No visual deficits  Sensation:  Light Touch: No apparent deficits  Strength:  Strength Comments: (B)  WFL, (B) elbows >/= 3/5, (B) shoulders <3/5     Extremities: RUE   RUE :  (shoulder AAROM 0-90 degrees, limited by pain; distal joints AROM WFL) and LUE   LUE:  (shoulder AAROM 0-90 degrees, limited by pain; distal joints AROM WFL)    Outcome Measures: [facility] Daily Activity  Putting on and taking off regular lower body clothing: A lot  Bathing (including washing, rinsing, drying): A lot  Putting on and taking off regular upper body clothing: A little  Toileting, which includes using toilet, bedpan or urinal: A lot  Taking care of personal grooming such as brushing teeth: A little  Eating Meals: None  Daily Activity - Total Score: 16    , Confusion Assessment Method-ICU (CAM-ICU)  Feature 3: Altered Level of Consciousness: Negative  , and E = Exercise and Early Mobility  Early Mobility/Exercise Safety Screen: Proceed with mobilization - No exclusion criteria met  ICU Mobility Scale: Transferring bed to chair    Education Documentation  Body Mechanics, taught by Susan Jack OT at 1/7/2025 12:50 PM.  Learner: Patient  Readiness: Acceptance  Method: Explanation, Demonstration  Response: Needs Reinforcement  Comment: log-roll, spine precautions, mobility precautions, ADL participation    Precautions, taught by Susan Jack OT at 1/7/2025 12:50 PM.  Learner: Patient  Readiness: Acceptance  Method: Explanation, Demonstration  Response: Needs Reinforcement  Comment: log-roll, spine precautions, mobility precautions, ADL participation    ADL Training, taught by Susan Jack OT at 1/7/2025 12:50 PM.  Learner: Patient  Readiness: Acceptance  Method: Explanation,  Demonstration  Response: Needs Reinforcement  Comment: log-roll, spine precautions, mobility precautions, ADL participation    Education Comments  No comments found.    Goals:  Encounter Problems       Encounter Problems (Active)       ADLs       Patient with complete upper body dressing with supervision level of assistance donning and doffing all UE clothes with no adaptive equipment. (Progressing)       Start:  01/07/25    Expected End:  01/21/25            Patient with complete lower body dressing with contact guard assist level of assistance donning and doffing all LE clothes  with PRN adaptive equipment. (Progressing)       Start:  01/07/25    Expected End:  01/21/25            Patient will complete daily grooming tasks with supervision level of assistance and PRN adaptive equipment while standing. (Progressing)       Start:  01/07/25    Expected End:  01/21/25            Patient will complete toileting including hygiene clothing management/hygiene with stand by assist level of assistance. (Progressing)       Start:  01/07/25    Expected End:  01/21/25               EXERCISE/STRENGTHENING       Patient will be educated on BUE HEP for increased ADL performance. (Progressing)       Start:  01/07/25    Expected End:  01/21/25            Patient will participate in >15 minutes of activity with <2 rest breaks to increase tolerance for ADL/functional task engagement. (Progressing)       Start:  01/07/25    Expected End:  01/21/25               MOBILITY       Patient will perform Functional mobility Household distances/Community Distances with contact guard assist level of assistance and least restrictive device in order to improve safety and functional mobility. (Progressing)       Start:  01/07/25    Expected End:  01/21/25               TRANSFERS       Patient will perform bed mobility supervision level of assistance in order to improve safety and independence with mobility (Progressing)       Start:  01/07/25     Expected End:  01/21/25            Patient will complete functional transfers with least restrictive device with contact guard assist level of assistance. (Progressing)       Start:  01/07/25    Expected End:  01/21/25               Susan Jack OTR/L  Inpatient Occupational Therapist   Rehab Office: [phone]

## 2025-01-07 NOTE — ED PROCEDURE NOTE
Procedure  Critical Care    Performed by: Glenn Mitchell MD  Authorized by: Glenn Mitchell MD    Critical care provider statement:     Critical care time (minutes):  120    Critical care time was exclusive of:  Separately billable procedures and treating other patients and teaching time    Critical care was necessary to treat or prevent imminent or life-threatening deterioration of the following conditions:  Respiratory failure, shock, CNS failure or compromise and circulatory failure    Critical care was time spent personally by me on the following activities:  Development of treatment plan with patient or surrogate, discussions with consultants, discussions with primary provider, evaluation of patient's response to treatment, examination of patient, interpretation of cardiac output measurements, ordering and review of laboratory studies, ordering and review of radiographic studies, pulse oximetry and re-evaluation of patient's condition    Care discussed with: admitting provider               Glenn Mitchell MD  01/06/25 0001

## 2025-01-07 NOTE — PROGRESS NOTES
Physical Therapy    Physical Therapy Evaluation & Treatment    Patient Name: Shamar Cunningham  MRN: [id number]  Department: [place] [address]  Room: 5067/5067-B  Today's Date: 1/7/2025   Time Calculation  Start Time: 0945  Stop Time: 1012  Time Calculation (min): 27 min    Assessment/Plan   PT Assessment  PT Assessment Results: Decreased strength, Impaired balance, Decreased mobility, Impaired judgement, Decreased safety awareness, Pain  Rehab Prognosis: Good  Barriers to Discharge Home: Caregiver assistance, Cognition needs, Physical needs  Caregiver Assistance: Patient lives alone and/or does not have reliable caregiver assistance  Physical Needs: Intermittent mobility assistance needed, Intermittent ADL assistance needed, High falls risk due to function or environment  Evaluation/Treatment Tolerance: Patient limited by pain  Medical Staff Made Aware: Yes  End of Session Communication: Bedside nurse  Assessment Comment: 44 y.o M found unresponsive, s/p arrest; now extubated; patient demo's impaired balance, strength, and functional mobility. patient would benefit from continued skilled PT services throughout hospital stay and upon DC  End of Session Patient Position: Up in chair, Alarm on   IP OR SWING BED PT PLAN  Inpatient or Swing Bed: Inpatient  PT Plan  Treatment/Interventions: Bed mobility, Transfer training, Gait training, Balance training, Strengthening, Therapeutic activity, Therapeutic exercise, Positioning, Postural re-education  PT Plan: Ongoing PT  PT Frequency: 4 times per week  PT Discharge Recommendations: High intensity level of continued care  PT Recommended Transfer Status: Assist x2 (chair)  PT - OK to Discharge: Yes      Subjective     General Visit Information:  General  Reason for Referral: presented to [facility] ED on 1/5 after being found unresponsive by EMS, he was intubated in trauma bay, lost pulses, with ROSC achieved after 1 round of CPR. He is being admitted to [facility] MICU on 1/6 for further  "management post arrest. Now extubated. (multiple extremity X-rays that showed punctate opacities in the soft tissue with no acute fractures. MRI neck was negative for acute changes.)  Past Medical History Relevant to Rehab: schizoaffective disorder, substance use disorder (PCP); reports multiple falls within the last 6 months  Family/Caregiver Present: No  Co-Treatment: OT  Co-Treatment Reason: to maximize patient safety and therapeutic gains 2/2 patient in c-collar with psych hx; unsuccesful mobilization by Rn staff  Prior to Session Communication: Bedside nurse  Patient Position Received: Bed, 3 rail up, Alarm on  General Comment: patient pleasant, agreeable to therapy, stated \"you're my angels\" and \"I love you guys\" during session; david  Home Living:  Home Living  Type of Home: Apartment  Lives With: Alone  Home Adaptive Equipment: Cane  Home Layout: One level  Home Access: Level entry  Bathroom Shower/Tub: Tub/shower unit  Bathroom Equipment: None  Prior Level of Function:  Prior Function Per Pt/Caregiver Report  Level of [place]: Independent with ADLs and functional transfers, Independent with homemaking with ambulation  ADL Assistance: Independent  Homemaking Assistance: Independent  Ambulatory Assistance: Independent (SPC for community distances)  Vocational: Other (Comment) (social security)  Hand Dominance: Right  Prior Function Comments: (-) drive  Precautions:  Precautions  Hearing/Visual Limitations: hearing appears WFL; denied wearing glasses  Medical Precautions: Fall precautions, Spinal precautions  Braces Applied: patient wearing Aspen C-collar on arrival      Vital Signs     Vitals Session Pre PT During PT Post PT   Heart Rate (BPM) 61  71   Resp (RR)   13   SpO2 % 92  95   /64  136/66   MAP (mmHg) 75              Objective   Pain:  Pain Assessment  Pain Assessment: 0-10  0-10 (Numeric) Pain Score:  (did not rate numerically)  Pain Type: Acute pain  Pain Location:  (teeth/mouth, low " back)  Cognition:  Cognition  Overall Cognitive Status: Within Functional Limits  Arousal/Alertness: Appropriate responses to stimuli  Orientation Level:  (AxO x3)  Following Commands: Follows one step commands with repetition (+cues; 90%)  Cognition Comments: appeared to have some difficulty with task sequencing    General Assessments:        Activity Tolerance  Early Mobility/Exercise Safety Screen: Proceed with mobilization - No exclusion criteria met    Sensation  Light Touch: No apparent deficits    Strength  Strength Comments: limited formal assessment d/t impiared command following/increase pain with movement; BUEs:  >3+/5, elbow flexion/ext >3/5, shoulder flexion <3/5; BLEs: PF/DF >/=3/5, knee ext >/=3/5, hip flexion >/=3-/5  Postural Control  Postural Control: Impaired    Static Sitting Balance  Static Sitting-Balance Support: Feet supported, Bilateral upper extremity supported  Static Sitting-Level of Assistance: Close supervision, Contact guard  Static Sitting-Comment/Number of Minutes: x1  Dynamic Sitting Balance  Dynamic Sitting-Balance Support: Feet supported, Bilateral upper extremity supported  Dynamic Sitting-Level of Assistance: Contact guard  Dynamic Sitting-Comments: x1    Static Standing Balance  Static Standing-Balance Support: Bilateral upper extremity supported  Static Standing-Level of Assistance: Minimum assistance, Moderate assistance  Static Standing-Comment/Number of Minutes: x2 with B arm in arm assist; difficulty standing with full erect posture  Dynamic Standing Balance  Dynamic Standing-Balance Support: Bilateral upper extremity supported  Dynamic Standing-Level of Assistance: Minimum assistance  Dynamic Standing-Comments: x2 with B arm in arm assist  Functional Assessments:  Bed Mobility  Bed Mobility: Yes  Bed Mobility 1  Bed Mobility 1: Supine to sitting  Level of Assistance 1: Minimum assistance, +2, Moderate verbal cues  Bed Mobility Comments 1: HOB  elevated    Transfers  Transfer: Yes  Transfer 1  Transfer From 1: Sit to, Stand to  Transfer to 1: Sit, Stand  Technique 1: Sit to stand, Stand to sit  Transfer Level of Assistance 1: Moderate assistance, +2, Moderate verbal cues, Arm in arm assistance  Trials/Comments 1: appeared to have increase pain with STS task  Transfers 2  Transfer From 2: Bed to  Transfer to 2: Chair with arms (+recliner)  Technique 2: Sit to stand, Stand to sit  Transfer Level of Assistance 2: Minimum assistance, +2, Moderate verbal cues, Hand held assistance  Trials/Comments 2: moderate unsteadiness during transfer; difficulty standing with erect posture; appeared to have difficulty with task sequencing    Ambulation/Gait Training  Ambulation/Gait Training Performed: Yes  Ambulation/Gait Training 1  Surface 1: Level tile  Device 1: No device  Assistance 1: Moderate verbal cues, Minimal tactile cues, Minimum assistance, Hand held assistance  Quality of Gait 1: Forward flexed posture, Antalgic, Decreased step length  Comments/Distance (ft) 1: x2; lateral side steps x3 ft towards HOB  Extremity/Trunk Assessments:  RUE   RUE :  (AAROM ~ 90 degrees shoulder flexion, appeared to be further limited by pain; remainder appeared WFL AROM)  LUE   LUE:  (AAROM ~ 90 degrees shoulder flexion, appeared to be further limited by pain; remainder appeared WFL AROM)  RLE   RLE :  (AROM grossly WFL however, appeared to report increase pain with knee flexion to 90 degrees AAROM)  LLE   LLE :  (AROM grossly WFL however, appeared to report increase pain with knee flexion to 90 degrees AAROM)  Treatments:  Therapeutic Activity  Therapeutic Activity Performed: Yes  Therapeutic Activity 1: Increase time spent with patient completing functional mobility tasks; patient's movement antalgic, requiring increase time and cues to complete all functional mobility tasks; patient sat EOB x10-12 mins cumulative with SBA-CGx1 assist. patient completed additional STS from chair  with MODx1, completed 75% of full standing. cues and assist for increasing erect posture  Outcome Measures:  [facility] Basic Mobility  Turning from your back to your side while in a flat bed without using bedrails: A little  Moving from lying on your back to sitting on the side of a flat bed without using bedrails: A little  Moving to and from bed to chair (including a wheelchair): A lot  Standing up from a chair using your arms (e.g. wheelchair or bedside chair): A little  To walk in hospital room: A lot  Climbing 3-5 steps with railing: A lot  Basic Mobility - Total Score: 15    FSS-ICU  Ambulation: Walks <50 feet with any assistance x1 or walks any distance with assistance x2 people  Rolling: Minimal assistance (performs 75% or more of task)  Sitting: Minimal assistance (performs 75% or more of task)  Transfer Sit-to-Stand: Total assistance (performs 25% or requires another person)  Transfer Supine-to-Sit: Moderate assistance (performs 50 - 74% of task)  Total Score: 13      Early Mobility/Exercise Safety Screen: Proceed with mobilization - No exclusion criteria met  ICU Mobility Scale: Transferring bed to chair [5]  E = Exercise and Early Mobility  Early Mobility/Exercise Safety Screen: Proceed with mobilization - No exclusion criteria met  ICU Mobility Scale: Transferring bed to chair    Encounter Problems       Encounter Problems (Active)       PT Problem       Patient will complete bed mobility with close supervision        Start:  01/07/25    Expected End:  01/28/25            Patient will complete STS with CGx1 using LRAD without acute LOB         Start:  01/07/25    Expected End:  01/28/25            Patient will complete static (close supervision) and dynamic (CGx1) standing balance activities using LRAD without acute LOB, while maintaining midline posture.        Start:  01/07/25    Expected End:  01/28/25            Patient will ambulate >/=50' with LRAD with CGx1 without acute LOB        Start:  01/07/25     Expected End:  01/28/25            Patient will participate in BLE there-ex program in order to assist in improving strength and to assist with the completion of functional mobility tasks.        Start:  01/07/25    Expected End:  01/28/25               Pain - Adult              Education Documentation  Body Mechanics, taught by Yelena Munoz PT at 1/7/2025  3:24 PM.  Learner: Patient  Readiness: Acceptance  Method: Explanation  Response: Needs Reinforcement  Comment: log roll for bed mobility, spine precautions for comfort d/t pain reported; mobility progression    Mobility Training, taught by Yelena Munoz PT at 1/7/2025  3:24 PM.  Learner: Patient  Readiness: Acceptance  Method: Explanation  Response: Needs Reinforcement  Comment: log roll for bed mobility, spine precautions for comfort d/t pain reported; mobility progression    Education Comments  No comments found.    Yelena Munoz, PT, DPT

## 2025-01-07 NOTE — PROGRESS NOTES
ICU to [facility] Transfer Summary     I:  ICU Admission Reason & Brief ICU Course:    Patient was admitted on 1/6/25 s/p cardiac arrest and ROSC within one cycle. He was ventilated on minimal vent settings and sedated with fentanyl/propofol. While weaning sedation, patient became agitated and was given Precedex. TTE showed reduced EF 40-45% most likely due to cardiac stunning. VBGs normalized (PH 7.38, lactate 1.7). His initial given high CK (3303) down-trended after mIVF. He was extubated without complications on 1/7/25 and started eating a regular diet after passing a bedside swallow eval.    C: Code Status/DPOA Info/Goals of Care/ACP Note    Full Code  DPOA/Contact Number: Ann (Mother, no working phone number at this time)    U: Unprescribing & Pertinent High-Risk Medications    Changes to home meds: none     Anticoagulation: Yes - [facility]H    Antibiotics:   [x] N/A - no current planned antimicrobioals    P: Pending Tests at the Time of Transfer   CT Chest, CT A/P, Echocardiogram, and MRI Neck done    Repeat 72-hr post-cardiac arrest TTE on 1/8/2025    A: Active consultants, including Rehab:   []  Subspecialty Consultants:  none  [x]  PT  [x]  OT  []  SLP  []  Wound Care    U: Uncertainty Measure/Diagnostic Pause:    Working diagnosis at the time of transfer cardiac arrest in the setting of phencyclidine use (s/p brief vasopressor requirement and intubation/extubation)     Diagnosis Degree of Certainty: 1. High degree of certainty about the clinical diagnosis.     S: Summary of Major Problems and To-Dos:   Shamar Cunningham is a 43 y/o M with PMH including schizoaffective disorder, substance use disorder (PCP) who presented to [facility] ED on 1/5 after being found unresponsive by EMS, he was intubated in trauma bay, lost pulses, with ROSC achieved after 1 round of CPR. He was admitted to [facility] MICU on 1/6 for further management post arrest. Had brief pressor requirement. Extubated 1/7 with no acute complications.      NEUROLOGY/PSYCH:  #Encephalopathy, resolved  -found down by EMS, intubated in ED 1/5  -likely toxic in setting of PCP use with + UDS on presentation  -potential component of metabolic in setting of hypercapnia with pCO2 on presentation of 82 on initial VBG  -CT head 1/5 without acute processes  -Weaned sedation 1/6 (propofol/Precedex, fentanyl)  -Extubated and passed swallow assessment 1/7    #Hx schizoaffective disorder  -per chart review, on fluoxetine 20 daily and olanzapine 30 nightly, trazodone 100 mg nightly  - Will restart home zyprexa and fluoxetine        CARDIOVASCULAR:  #Shock, likely hypovolemic vs cardiogenic (Resolved)  #s/p cardiac arrest  - no known cardiac history  - lost pulses in ED shortly after presentation and intubation, ROSC achieved after 1 round of CPR, 1 dose of epinephrine on 1/5  -Was on norepinephrine 0.03 on arrival to MICU, weaned off.  - Troponin 25 on presentation  - Lactate 11.2 on presentation, down-trended to 1.2  - On maintenance fluids @ 150mL/hr with down-trending CK (Goal CK <1000)  - TTE showed decreased ejection fraction. Likely in the setting of cardiac stunning.  - Plan to repeat ECHO on 1/8.     PULMONARY:  #Intubated, s/p extubation 1/7  -intubated 1/5 for airway protection  -Vent settings previously  ACVC 20/450/5/30%  -no history of lung disease per chart review  -Extubated on 1/7. Satting well on RA.        RENAL/GENITOURINARY:  #Respiratory acidosis (improved)  #AGMA  -Initial VBG (1/5 at 2137) with pH 7.02 / pCO2 82, lactate 8.1. Bicarb on CMP 19, with anion gap 19.  -Subsequent VBG (1/5 at 2253) with pH 7.24 / pCO2 51, lactate 3.9  -SCr 2.22 on presentation, baseline normal  -unclear etiology, but differential includes prerenal d/t hypovolemia vs potential rhabdo after being found down vs other etiology  Plan  -repeat VBG this morning  showed normal PH and lactate.  - s/p 1L LR  - On maintenance 150ml/hr. (Until CK<1000)  - CK trending down.      MUSCULOSKELETAL/ SKIN:  #Multiple facial fractures, stable  -CT on presentations with multiple subacute facial fractures, had lacerations which trauma sutured 1/5  -No cervical spine fractures noted on CT, however patient unable to respond to questions about plan to clear C-Spine   Plan:  -MRI neck was negative for acute changes. Remove c-spine precautions.  -Patient has known drug seeking behavior. Will start percocet Q8 prn.     To-do list after transfer:  []F/U evening RFP & CK, discontinue mIVF when CK<1000)  []Repeat ALFRED 1/8/2025 (ordered)  []Dental follow-up for chipped teeth (#8, #9)     E: Exam, including Lines/Drains/Airways & Data Review:   FEN   Fluids: S/P 1L, on maintenance fluids 150ml/hr  Electrolytes: [PRN]  Nutrition: Regular     Prophylaxis:  DVT ppx: [Lovenox]  GI ppx: [None]  Bowel care: [None]  Hardware:    Urethral Catheter Coude 16 Fr. (Active)   Placement Date/Time: 01/05/25 2240   Catheter Type: Coude  Tube Size (Fr.): 16 Fr.  Catheter Balloon Size: 10 mL   Number of days: 1     Code Status: full code (Discussed when extubated)    Physical Exam  Constitutional:       Comments: Patient complains of generalized severe pain.    HENT:      Head:      Comments: Bruising of the face with laceration of right upper eyelid. Patient has a chipped tooth   Cardiovascular:      Rate and Rhythm: Normal rate and regular rhythm.      Pulses: Normal pulses.      Heart sounds: Normal heart sounds.   Pulmonary:      Effort: Pulmonary effort is normal. No respiratory distress.      Breath sounds: Normal breath sounds. No stridor.   Abdominal:      General: Abdomen is flat.      Palpations: Abdomen is soft.      Tenderness: There is no guarding.   Skin:     General: Skin is warm.     Within 30 minutes of the patient physically leaving the floor, a Floor Readiness Note needs to be placed with updated vitals.

## 2025-01-07 NOTE — NURSING NOTE
Admission Mote:    Patient arrived to [place] [id number] from MICU. Patient is alert and oriented 2-3. VSS. Belongings with patient includes shirt and a pair of pants. Tele applied. Patient oriented to room and call light system. Discharge disposition unknown at this time.    Yesenia Olvera RN

## 2025-01-07 NOTE — PROGRESS NOTES
SOCIAL WORK NOTE      01/07/25 1538   Discharge Planning   Living Arrangements Alone   Support Systems Family members;Parent   Assistance Needed independent   Type of Residence Private residence   Home or Post Acute Services Community services   Expected Discharge Disposition   (TBD)   Does the patient need discharge transport arranged? Yes   RoundTrip coordination needed? Yes   Has discharge transport been arranged? No   Financial Resource Strain   How hard is it for you to pay for the very basics like food, housing, medical care, and heating? Not hard   Housing Stability   In the last 12 months, was there a time when you were not able to pay the mortgage or rent on time? N   In the past 12 months, how many times have you moved where you were living? 0   At any time in the past 12 months, were you homeless or living in a shelter (including now)? N   Transportation Needs   In the past 12 months, has lack of transportation kept you from medical appointments or from getting medications? no   In the past 12 months, has lack of transportation kept you from meetings, work, or from getting things needed for daily living? No     NOK: mother   DME: denied  Home Care: denied  HD: denied  PCP: [facility]?  Additional information:  SW met with patient at bedside for assessment. He normally lives alone. He confirmed mother is decision maker. Gave number for her that was incorrect. Similar number found ([phone]). [facility] confirmed  location. Building  does not have working number for mother. No reported issues with SDOH, but did confirm drug use ([facility] and PCP). Agreeable to speak with [facility] (SW placed referral, [facility] to see Weds). Social work to follow.  Laura Redmond, GLENNY, LISW-S ([id number])

## 2025-01-08 ENCOUNTER — APPOINTMENT (OUTPATIENT)
Dept: CARDIOLOGY | Facility: HOSPITAL | Age: 45
End: 2025-01-08
Payer: COMMERCIAL

## 2025-01-08 LAB
ALBUMIN SERPL BCP-MCNC: 3.7 G/DL (ref 3.4–5)
AMPHETAMINES UR QL SCN: ABNORMAL
ANION GAP SERPL CALC-SCNC: 9 MMOL/L (ref 10–20)
ATRIAL RATE: 57 BPM
BARBITURATES UR QL SCN: ABNORMAL
BASOPHILS # BLD AUTO: 0.02 X10*3/UL (ref 0–0.1)
BASOPHILS NFR BLD AUTO: 0.2 %
BENZODIAZ UR QL SCN: ABNORMAL
BUN SERPL-MCNC: 10 MG/DL (ref 6–23)
BZE UR QL SCN: ABNORMAL
CALCIUM SERPL-MCNC: 9.1 MG/DL (ref 8.6–10.6)
CANNABINOIDS UR QL SCN: ABNORMAL
CHLORIDE SERPL-SCNC: 106 MMOL/L (ref 98–107)
CK SERPL-CCNC: 3665 U/L (ref 0–325)
CO2 SERPL-SCNC: 30 MMOL/L (ref 21–32)
CREAT SERPL-MCNC: 1.1 MG/DL (ref 0.5–1.3)
EGFRCR SERPLBLD CKD-EPI 2021: 85 ML/MIN/1.73M*2
EJECTION FRACTION APICAL 4 CHAMBER: 62.7
EJECTION FRACTION: 61 %
EOSINOPHIL # BLD AUTO: 0.06 X10*3/UL (ref 0–0.7)
EOSINOPHIL NFR BLD AUTO: 0.7 %
ERYTHROCYTE [DISTWIDTH] IN BLOOD BY AUTOMATED COUNT: 13.2 % (ref 11.5–14.5)
FENTANYL+NORFENTANYL UR QL SCN: ABNORMAL
GLUCOSE SERPL-MCNC: 105 MG/DL (ref 74–99)
HCT VFR BLD AUTO: 34.3 % (ref 41–52)
HGB BLD-MCNC: 12.3 G/DL (ref 13.5–17.5)
IMM GRANULOCYTES # BLD AUTO: 0.02 X10*3/UL (ref 0–0.7)
IMM GRANULOCYTES NFR BLD AUTO: 0.2 % (ref 0–0.9)
LEFT VENTRICLE INTERNAL DIMENSION DIASTOLE: 5.25 CM (ref 3.5–6)
LYMPHOCYTES # BLD AUTO: 1.32 X10*3/UL (ref 1.2–4.8)
LYMPHOCYTES NFR BLD AUTO: 15.4 %
MAGNESIUM SERPL-MCNC: 1.84 MG/DL (ref 1.6–2.4)
MCH RBC QN AUTO: 31.6 PG (ref 26–34)
MCHC RBC AUTO-ENTMCNC: 35.9 G/DL (ref 32–36)
MCV RBC AUTO: 88 FL (ref 80–100)
METHADONE UR QL SCN: ABNORMAL
MONOCYTES # BLD AUTO: 0.88 X10*3/UL (ref 0.1–1)
MONOCYTES NFR BLD AUTO: 10.3 %
NEUTROPHILS # BLD AUTO: 6.25 X10*3/UL (ref 1.2–7.7)
NEUTROPHILS NFR BLD AUTO: 73.2 %
NRBC BLD-RTO: 0 /100 WBCS (ref 0–0)
OPIATES UR QL SCN: ABNORMAL
OXYCODONE+OXYMORPHONE UR QL SCN: ABNORMAL
P AXIS: 91 DEGREES
P OFFSET: 198 MS
P ONSET: 152 MS
PCP UR QL SCN: ABNORMAL
PHOSPHATE SERPL-MCNC: 1.8 MG/DL (ref 2.5–4.9)
PLATELET # BLD AUTO: 139 X10*3/UL (ref 150–450)
POTASSIUM SERPL-SCNC: 3.5 MMOL/L (ref 3.5–5.3)
PR INTERVAL: 134 MS
Q ONSET: 219 MS
QRS COUNT: 10 BEATS
QRS DURATION: 98 MS
QT INTERVAL: 440 MS
QTC CALCULATION(BAZETT): 428 MS
QTC FREDERICIA: 432 MS
R AXIS: 83 DEGREES
RBC # BLD AUTO: 3.89 X10*6/UL (ref 4.5–5.9)
RIGHT VENTRICLE PEAK SYSTOLIC PRESSURE: 59 MMHG
SODIUM SERPL-SCNC: 141 MMOL/L (ref 136–145)
T AXIS: 59 DEGREES
T OFFSET: 439 MS
VENTRICULAR RATE: 57 BPM
WBC # BLD AUTO: 8.6 X10*3/UL (ref 4.4–11.3)

## 2025-01-08 PROCEDURE — 2500000001 HC RX 250 WO HCPCS SELF ADMINISTERED DRUGS (ALT 637 FOR MEDICARE OP): Mod: SE | Performed by: INTERNAL MEDICINE

## 2025-01-08 PROCEDURE — 80307 DRUG TEST PRSMV CHEM ANLYZR: CPT | Performed by: INTERNAL MEDICINE

## 2025-01-08 PROCEDURE — 85025 COMPLETE CBC W/AUTO DIFF WBC: CPT | Performed by: INTERNAL MEDICINE

## 2025-01-08 PROCEDURE — 80069 RENAL FUNCTION PANEL: CPT | Performed by: INTERNAL MEDICINE

## 2025-01-08 PROCEDURE — 2500000004 HC RX 250 GENERAL PHARMACY W/ HCPCS (ALT 636 FOR OP/ED): Mod: SE | Performed by: INTERNAL MEDICINE

## 2025-01-08 PROCEDURE — 83735 ASSAY OF MAGNESIUM: CPT | Performed by: INTERNAL MEDICINE

## 2025-01-08 PROCEDURE — 99233 SBSQ HOSP IP/OBS HIGH 50: CPT | Performed by: INTERNAL MEDICINE

## 2025-01-08 PROCEDURE — 93325 DOPPLER ECHO COLOR FLOW MAPG: CPT | Performed by: INTERNAL MEDICINE

## 2025-01-08 PROCEDURE — 93325 DOPPLER ECHO COLOR FLOW MAPG: CPT

## 2025-01-08 PROCEDURE — 1100000001 HC PRIVATE ROOM DAILY

## 2025-01-08 PROCEDURE — 36415 COLL VENOUS BLD VENIPUNCTURE: CPT | Performed by: INTERNAL MEDICINE

## 2025-01-08 PROCEDURE — 97530 THERAPEUTIC ACTIVITIES: CPT | Mod: GP

## 2025-01-08 PROCEDURE — 93308 TTE F-UP OR LMTD: CPT | Performed by: INTERNAL MEDICINE

## 2025-01-08 PROCEDURE — 82550 ASSAY OF CK (CPK): CPT | Performed by: INTERNAL MEDICINE

## 2025-01-08 PROCEDURE — 2500000002 HC RX 250 W HCPCS SELF ADMINISTERED DRUGS (ALT 637 FOR MEDICARE OP, ALT 636 FOR OP/ED): Mod: SE | Performed by: INTERNAL MEDICINE

## 2025-01-08 PROCEDURE — 99255 IP/OBS CONSLTJ NEW/EST HI 80: CPT

## 2025-01-08 PROCEDURE — 2500000001 HC RX 250 WO HCPCS SELF ADMINISTERED DRUGS (ALT 637 FOR MEDICARE OP): Mod: SE | Performed by: STUDENT IN AN ORGANIZED HEALTH CARE EDUCATION/TRAINING PROGRAM

## 2025-01-08 PROCEDURE — 93321 DOPPLER ECHO F-UP/LMTD STD: CPT | Performed by: INTERNAL MEDICINE

## 2025-01-08 RX ORDER — METOPROLOL TARTRATE 50 MG/1
100 TABLET ORAL ONCE AS NEEDED
Status: COMPLETED | OUTPATIENT
Start: 2025-01-08 | End: 2025-01-09

## 2025-01-08 RX ORDER — METOPROLOL TARTRATE 1 MG/ML
5 INJECTION, SOLUTION INTRAVENOUS ONCE AS NEEDED
Status: DISCONTINUED | OUTPATIENT
Start: 2025-01-08 | End: 2025-01-09

## 2025-01-08 RX ORDER — NITROGLYCERIN 0.4 MG/1
0.8 TABLET SUBLINGUAL ONCE
Status: DISCONTINUED | OUTPATIENT
Start: 2025-01-08 | End: 2025-01-09

## 2025-01-08 RX ORDER — LIDOCAINE 560 MG/1
2 PATCH PERCUTANEOUS; TOPICAL; TRANSDERMAL EVERY 24 HOURS
Status: DISCONTINUED | OUTPATIENT
Start: 2025-01-09 | End: 2025-01-10 | Stop reason: HOSPADM

## 2025-01-08 RX ORDER — LORAZEPAM 2 MG/ML
0.5 INJECTION INTRAMUSCULAR EVERY 5 MIN PRN
Status: DISCONTINUED | OUTPATIENT
Start: 2025-01-08 | End: 2025-01-10 | Stop reason: HOSPADM

## 2025-01-08 RX ORDER — METOPROLOL TARTRATE 50 MG/1
100 TABLET ORAL ONCE
Status: COMPLETED | OUTPATIENT
Start: 2025-01-08 | End: 2025-01-09

## 2025-01-08 RX ORDER — METOPROLOL TARTRATE 1 MG/ML
5 INJECTION, SOLUTION INTRAVENOUS ONCE
Status: DISCONTINUED | OUTPATIENT
Start: 2025-01-08 | End: 2025-01-09

## 2025-01-08 RX ORDER — SODIUM CHLORIDE, SODIUM LACTATE, POTASSIUM CHLORIDE, CALCIUM CHLORIDE 600; 310; 30; 20 MG/100ML; MG/100ML; MG/100ML; MG/100ML
125 INJECTION, SOLUTION INTRAVENOUS CONTINUOUS
Status: ACTIVE | OUTPATIENT
Start: 2025-01-08 | End: 2025-01-09

## 2025-01-08 RX ORDER — HYDROXYZINE HYDROCHLORIDE 25 MG/1
25 TABLET, FILM COATED ORAL EVERY 6 HOURS PRN
Status: DISCONTINUED | OUTPATIENT
Start: 2025-01-08 | End: 2025-01-10 | Stop reason: HOSPADM

## 2025-01-08 RX ORDER — ACETAMINOPHEN 500 MG
10 TABLET ORAL NIGHTLY
Status: DISCONTINUED | OUTPATIENT
Start: 2025-01-08 | End: 2025-01-10 | Stop reason: HOSPADM

## 2025-01-08 RX ADMIN — FLUOXETINE HYDROCHLORIDE 20 MG: 20 CAPSULE ORAL at 10:53

## 2025-01-08 RX ADMIN — ENOXAPARIN SODIUM 40 MG: 100 INJECTION SUBCUTANEOUS at 10:51

## 2025-01-08 RX ADMIN — BENZTROPINE MESYLATE 2 MG: 2 TABLET ORAL at 20:27

## 2025-01-08 RX ADMIN — ACETAMINOPHEN 650 MG: 325 TABLET, FILM COATED ORAL at 06:24

## 2025-01-08 RX ADMIN — SODIUM CHLORIDE, POTASSIUM CHLORIDE, SODIUM LACTATE AND CALCIUM CHLORIDE 125 ML/HR: 600; 310; 30; 20 INJECTION, SOLUTION INTRAVENOUS at 18:34

## 2025-01-08 RX ADMIN — PANTOPRAZOLE SODIUM 40 MG: 40 TABLET, DELAYED RELEASE ORAL at 06:24

## 2025-01-08 RX ADMIN — ROSUVASTATIN CALCIUM 20 MG: 20 TABLET, FILM COATED ORAL at 20:27

## 2025-01-08 RX ADMIN — Medication 10 MG: at 23:32

## 2025-01-08 RX ADMIN — OXYCODONE HYDROCHLORIDE AND ACETAMINOPHEN 1 TABLET: 5; 325 TABLET ORAL at 20:30

## 2025-01-08 RX ADMIN — OXYCODONE HYDROCHLORIDE AND ACETAMINOPHEN 1 TABLET: 5; 325 TABLET ORAL at 10:51

## 2025-01-08 RX ADMIN — ACETAMINOPHEN 650 MG: 325 TABLET, FILM COATED ORAL at 18:34

## 2025-01-08 RX ADMIN — ACETAMINOPHEN 650 MG: 325 TABLET, FILM COATED ORAL at 11:04

## 2025-01-08 RX ADMIN — ACETAMINOPHEN 650 MG: 325 TABLET, FILM COATED ORAL at 23:31

## 2025-01-08 RX ADMIN — OLANZAPINE 30 MG: 20 TABLET, FILM COATED ORAL at 20:27

## 2025-01-08 RX ADMIN — BENZTROPINE MESYLATE 2 MG: 2 TABLET ORAL at 10:53

## 2025-01-08 ASSESSMENT — PAIN DESCRIPTION - LOCATION
LOCATION: BACK
LOCATION: OTHER (COMMENT)
LOCATION: BACK

## 2025-01-08 ASSESSMENT — COGNITIVE AND FUNCTIONAL STATUS - GENERAL
MOVING TO AND FROM BED TO CHAIR: A LITTLE
MOBILITY SCORE: 23
CLIMB 3 TO 5 STEPS WITH RAILING: A LITTLE
MOBILITY SCORE: 18
WALKING IN HOSPITAL ROOM: A LITTLE
MOVING FROM LYING ON BACK TO SITTING ON SIDE OF FLAT BED WITH BEDRAILS: A LITTLE
MOBILITY SCORE: 24
DAILY ACTIVITIY SCORE: 24
STANDING UP FROM CHAIR USING ARMS: A LITTLE
CLIMB 3 TO 5 STEPS WITH RAILING: A LITTLE
TURNING FROM BACK TO SIDE WHILE IN FLAT BAD: A LITTLE

## 2025-01-08 ASSESSMENT — PAIN SCALES - GENERAL
PAINLEVEL_OUTOF10: 4
PAINLEVEL_OUTOF10: 10 - WORST POSSIBLE PAIN
PAINLEVEL_OUTOF10: 10 - WORST POSSIBLE PAIN
PAINLEVEL_OUTOF10: 8
PAINLEVEL_OUTOF10: 10 - WORST POSSIBLE PAIN
PAINLEVEL_OUTOF10: 5 - MODERATE PAIN
PAINLEVEL_OUTOF10: 4

## 2025-01-08 ASSESSMENT — PAIN SCALES - WONG BAKER
WONGBAKER_NUMERICALRESPONSE: HURTS LITTLE MORE
WONGBAKER_NUMERICALRESPONSE: HURTS WHOLE LOT
WONGBAKER_NUMERICALRESPONSE: HURTS WHOLE LOT

## 2025-01-08 ASSESSMENT — PAIN - FUNCTIONAL ASSESSMENT
PAIN_FUNCTIONAL_ASSESSMENT: 0-10

## 2025-01-08 ASSESSMENT — PAIN DESCRIPTION - ORIENTATION: ORIENTATION: MID
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
## 2025-01-08 NOTE — PROGRESS NOTES
Physical Therapy    Therapy Communication Note    Patient Name: Shamar Cunningham  MRN: [id number]  Today's Date: 1/8/2025       Discipline: Physical Therapy      PT Missed Visit: Yes  Missed Visit Reason: Patient refused      01/08/25 at 9:51 AM   Lilian Curry, PT

## 2025-01-08 NOTE — PROGRESS NOTES
"Physical Therapy    Physical Therapy Treatment    Patient Name: Shamar Cunningham  MRN: [id number]  Today's Date: 1/8/2025  Time Calculation  Start Time: 1451  Stop Time: 1504  Time Calculation (min): 13 min       Assessment/Plan   PT Assessment  Barriers to Discharge Home: Caregiver assistance, Physical needs  Caregiver Assistance: Patient lives alone and/or does not have reliable caregiver assistance  Physical Needs: Intermittent mobility assistance needed  End of Session Communication: Bedside nurse  Assessment Comment: Pt demonstrated improvement in mobility from inital eval. Pt limited this visit due to c/o dizziness (RN and MD informed). Update recommendation to low intensity PT.  End of Session Patient Position: Bed, 3 rail up, Alarm off, not on at start of session     PT Plan  Treatment/Interventions: Bed mobility, Transfer training, Gait training, Balance training, Strengthening, Therapeutic activity, Therapeutic exercise, Positioning, Postural re-education  PT Plan: Ongoing PT  PT Frequency: 3 times per week  PT Discharge Recommendations: Low intensity level of continued care  Equipment Recommended upon Discharge:  (TBD)  PT Recommended Transfer Status: Contact guard  PT - OK to Discharge: Yes      General Visit Information:   PT  Visit  PT Received On: 01/08/25  Prior to Session Communication: Bedside nurse  Patient Position Received: Bed, 3 rail up, Alarm off, not on at start of session  General Comment: supine in bed, willing to participate with encouragement of MD; during session, pt stated \"the voices in my head have been telling me to hurt myself\" and \"I don't feel safe returning to my place\"--Dr. Ceron informed.     Subjective   Precautions:  Precautions  Medical Precautions: Fall precautions, Spinal precautions       Objective   Pain:  Pain Assessment  Pain Assessment: 0-10  0-10 (Numeric) Pain Score: 10 - Worst possible pain  Pain Location: Back       Postural Control:     Static Sitting " Balance  Static Sitting-Level of Assistance: Distant supervision  Dynamic Sitting Balance  Dynamic Sitting-Level of Assistance: Close supervision  Static Standing Balance  Static Standing-Level of Assistance: Close supervision  Dynamic Standing Balance  Dynamic Standing-Level of Assistance: Contact guard      PT Treatments:           Bed Mobility  Bed Mobility: Yes  Bed Mobility 1  Bed Mobility 1: Supine to sitting, Sitting to supine  Level of Assistance 1: Close supervision  Ambulation/Gait Training  Ambulation/Gait Training Performed: Yes  Ambulation/Gait Training 1  Device 1: No device  Assistance 1: Contact guard  Quality of Gait 1: Narrow base of support, Diminished heel strike, Decreased step length  Comments/Distance (ft) 1: 3 feet fwd/bwd (c/o dizziness and declined to ambulate further)  Transfers  Transfer: Yes  Transfer 1  Technique 1: Sit to stand, Stand to sit  Transfer Level of Assistance 1: Close supervision  Trials/Comments 1: x4 trials             Outcome Measures:  [facility] Basic Mobility  Turning from your back to your side while in a flat bed without using bedrails: A little  Moving from lying on your back to sitting on the side of a flat bed without using bedrails: A little  Moving to and from bed to chair (including a wheelchair): A little  Standing up from a chair using your arms (e.g. wheelchair or bedside chair): A little  To walk in hospital room: A little  Climbing 3-5 steps with railing: A little  Basic Mobility - Total Score: 18                            Education Documentation  Mobility Training, taught by Lilian Curry, PT at 1/8/2025  3:16 PM.  Learner: Patient  Readiness: Acceptance  Method: Explanation  Response: Needs Reinforcement    Education Comments  No comments found.               Encounter Problems       Encounter Problems (Active)       PT Problem       Patient will complete bed mobility with close supervision  (Progressing)       Start:  01/07/25    Expected End:   01/28/25            Patient will complete STS with CGx1 using LRAD without acute LOB   (Met)       Start:  01/07/25    Expected End:  01/28/25    Resolved:  01/08/25         Patient will complete static (close supervision) and dynamic (CGx1) standing balance activities using LRAD without acute LOB, while maintaining midline posture.  (Progressing)       Start:  01/07/25    Expected End:  01/28/25            Patient will ambulate >/=50' with LRAD with CGx1 without acute LOB  (Progressing)       Start:  01/07/25    Expected End:  01/28/25            Patient will participate in BLE there-ex program in order to assist in improving strength and to assist with the completion of functional mobility tasks.  (Progressing)       Start:  01/07/25    Expected End:  01/28/25               PT Problem       pt will perform sit to stand independent  (Progressing)       Start:  01/08/25    Expected End:  01/28/25               Pain - Adult                01/08/25 at 3:17 PM   Lilian Curry, PT

## 2025-01-08 NOTE — RESEARCH NOTES
Artificial Intelligence Monitoring in Nursing (AIMS Nursing) Study    Principle Investigator - Dr. Christiano Wilson  Research Coordinator - Shannon Chang RN     Patient Name - Shamar Cunningham  Date - 1/8/2025 3:02 PM  Location - [place] [address]    Shamar Cunningham was approached by Shannon Chang RN to talk about participating in the AIMS Nursing Study. The patient was not able to be approached, a research coordinator will come back at a later time. Study protocol was followed and patient was given study contact information.     Shannon Chang RN
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
## 2025-01-08 NOTE — CARE PLAN
The patient's goals for the shift include      The clinical goals for the shift include patient will pass swallow evaluation    Over the shift, the patient did not make progress toward the following goals. Barriers to progression include . Recommendations to address these barriers include .

## 2025-01-09 ENCOUNTER — APPOINTMENT (OUTPATIENT)
Dept: RADIOLOGY | Facility: HOSPITAL | Age: 45
End: 2025-01-09
Payer: COMMERCIAL

## 2025-01-09 ENCOUNTER — APPOINTMENT (OUTPATIENT)
Dept: CARDIOLOGY | Facility: HOSPITAL | Age: 45
End: 2025-01-09
Payer: COMMERCIAL

## 2025-01-09 LAB
ALBUMIN SERPL BCP-MCNC: 3.8 G/DL (ref 3.4–5)
ANION GAP SERPL CALC-SCNC: 9 MMOL/L (ref 10–20)
BASOPHILS # BLD AUTO: 0.04 X10*3/UL (ref 0–0.1)
BASOPHILS NFR BLD AUTO: 0.3 %
BUN SERPL-MCNC: 7 MG/DL (ref 6–23)
CALCIUM SERPL-MCNC: 9.4 MG/DL (ref 8.6–10.6)
CHLORIDE SERPL-SCNC: 106 MMOL/L (ref 98–107)
CK SERPL-CCNC: 2492 U/L (ref 0–325)
CO2 SERPL-SCNC: 29 MMOL/L (ref 21–32)
CREAT SERPL-MCNC: 1.03 MG/DL (ref 0.5–1.3)
EGFRCR SERPLBLD CKD-EPI 2021: >90 ML/MIN/1.73M*2
EOSINOPHIL # BLD AUTO: 0.07 X10*3/UL (ref 0–0.7)
EOSINOPHIL NFR BLD AUTO: 0.6 %
ERYTHROCYTE [DISTWIDTH] IN BLOOD BY AUTOMATED COUNT: 13 % (ref 11.5–14.5)
ERYTHROCYTE [DISTWIDTH] IN BLOOD BY AUTOMATED COUNT: 13.1 % (ref 11.5–14.5)
GLUCOSE SERPL-MCNC: 98 MG/DL (ref 74–99)
HCT VFR BLD AUTO: 36.2 % (ref 41–52)
HCT VFR BLD AUTO: 40.5 % (ref 41–52)
HGB BLD-MCNC: 12.5 G/DL (ref 13.5–17.5)
HGB BLD-MCNC: 13.7 G/DL (ref 13.5–17.5)
IMM GRANULOCYTES # BLD AUTO: 0.04 X10*3/UL (ref 0–0.7)
IMM GRANULOCYTES NFR BLD AUTO: 0.3 % (ref 0–0.9)
LYMPHOCYTES # BLD AUTO: 1.46 X10*3/UL (ref 1.2–4.8)
LYMPHOCYTES NFR BLD AUTO: 12.1 %
MAGNESIUM SERPL-MCNC: 1.98 MG/DL (ref 1.6–2.4)
MCH RBC QN AUTO: 31.1 PG (ref 26–34)
MCH RBC QN AUTO: 31.6 PG (ref 26–34)
MCHC RBC AUTO-ENTMCNC: 33.8 G/DL (ref 32–36)
MCHC RBC AUTO-ENTMCNC: 34.5 G/DL (ref 32–36)
MCV RBC AUTO: 92 FL (ref 80–100)
MCV RBC AUTO: 92 FL (ref 80–100)
MONOCYTES # BLD AUTO: 1.41 X10*3/UL (ref 0.1–1)
MONOCYTES NFR BLD AUTO: 11.7 %
NEUTROPHILS # BLD AUTO: 9.01 X10*3/UL (ref 1.2–7.7)
NEUTROPHILS NFR BLD AUTO: 75 %
NRBC BLD-RTO: 0 /100 WBCS (ref 0–0)
NRBC BLD-RTO: 0 /100 WBCS (ref 0–0)
PHOSPHATE SERPL-MCNC: 2.6 MG/DL (ref 2.5–4.9)
PLATELET # BLD AUTO: 148 X10*3/UL (ref 150–450)
PLATELET # BLD AUTO: 161 X10*3/UL (ref 150–450)
POTASSIUM SERPL-SCNC: 3.7 MMOL/L (ref 3.5–5.3)
RBC # BLD AUTO: 3.95 X10*6/UL (ref 4.5–5.9)
RBC # BLD AUTO: 4.4 X10*6/UL (ref 4.5–5.9)
SODIUM SERPL-SCNC: 140 MMOL/L (ref 136–145)
WBC # BLD AUTO: 11.9 X10*3/UL (ref 4.4–11.3)
WBC # BLD AUTO: 12 X10*3/UL (ref 4.4–11.3)

## 2025-01-09 PROCEDURE — 80069 RENAL FUNCTION PANEL: CPT | Performed by: INTERNAL MEDICINE

## 2025-01-09 PROCEDURE — 36415 COLL VENOUS BLD VENIPUNCTURE: CPT | Performed by: INTERNAL MEDICINE

## 2025-01-09 PROCEDURE — A9502 TC99M TETROFOSMIN: HCPCS | Mod: SE | Performed by: INTERNAL MEDICINE

## 2025-01-09 PROCEDURE — 1200000002 HC GENERAL ROOM WITH TELEMETRY DAILY

## 2025-01-09 PROCEDURE — 85025 COMPLETE CBC W/AUTO DIFF WBC: CPT | Performed by: INTERNAL MEDICINE

## 2025-01-09 PROCEDURE — 87040 BLOOD CULTURE FOR BACTERIA: CPT | Performed by: INTERNAL MEDICINE

## 2025-01-09 PROCEDURE — 2500000001 HC RX 250 WO HCPCS SELF ADMINISTERED DRUGS (ALT 637 FOR MEDICARE OP): Mod: SE | Performed by: INTERNAL MEDICINE

## 2025-01-09 PROCEDURE — 82550 ASSAY OF CK (CPK): CPT | Performed by: INTERNAL MEDICINE

## 2025-01-09 PROCEDURE — 83735 ASSAY OF MAGNESIUM: CPT | Performed by: INTERNAL MEDICINE

## 2025-01-09 PROCEDURE — 3430000001 HC RX 343 DIAGNOSTIC RADIOPHARMACEUTICALS: Mod: SE | Performed by: INTERNAL MEDICINE

## 2025-01-09 PROCEDURE — 2500000001 HC RX 250 WO HCPCS SELF ADMINISTERED DRUGS (ALT 637 FOR MEDICARE OP): Mod: SE | Performed by: STUDENT IN AN ORGANIZED HEALTH CARE EDUCATION/TRAINING PROGRAM

## 2025-01-09 PROCEDURE — 2500000004 HC RX 250 GENERAL PHARMACY W/ HCPCS (ALT 636 FOR OP/ED): Mod: SE | Performed by: INTERNAL MEDICINE

## 2025-01-09 PROCEDURE — 99233 SBSQ HOSP IP/OBS HIGH 50: CPT | Performed by: INTERNAL MEDICINE

## 2025-01-09 PROCEDURE — 99254 IP/OBS CNSLTJ NEW/EST MOD 60: CPT | Performed by: STUDENT IN AN ORGANIZED HEALTH CARE EDUCATION/TRAINING PROGRAM

## 2025-01-09 PROCEDURE — 85027 COMPLETE CBC AUTOMATED: CPT | Performed by: INTERNAL MEDICINE

## 2025-01-09 PROCEDURE — 2500000005 HC RX 250 GENERAL PHARMACY W/O HCPCS: Mod: SE | Performed by: INTERNAL MEDICINE

## 2025-01-09 PROCEDURE — 2500000002 HC RX 250 W HCPCS SELF ADMINISTERED DRUGS (ALT 637 FOR MEDICARE OP, ALT 636 FOR OP/ED): Mod: SE | Performed by: INTERNAL MEDICINE

## 2025-01-09 PROCEDURE — 78452 HT MUSCLE IMAGE SPECT MULT: CPT | Performed by: STUDENT IN AN ORGANIZED HEALTH CARE EDUCATION/TRAINING PROGRAM

## 2025-01-09 RX ORDER — REGADENOSON 0.08 MG/ML
0.4 INJECTION, SOLUTION INTRAVENOUS
Status: CANCELLED | OUTPATIENT
Start: 2025-01-09

## 2025-01-09 RX ORDER — AMINOPHYLLINE 25 MG/ML
125 INJECTION, SOLUTION INTRAVENOUS AS NEEDED
Status: CANCELLED | OUTPATIENT
Start: 2025-01-09

## 2025-01-09 RX ORDER — SODIUM CHLORIDE, SODIUM LACTATE, POTASSIUM CHLORIDE, CALCIUM CHLORIDE 600; 310; 30; 20 MG/100ML; MG/100ML; MG/100ML; MG/100ML
125 INJECTION, SOLUTION INTRAVENOUS CONTINUOUS
Status: DISCONTINUED | OUTPATIENT
Start: 2025-01-09 | End: 2025-01-10

## 2025-01-09 RX ADMIN — ENOXAPARIN SODIUM 40 MG: 100 INJECTION SUBCUTANEOUS at 12:04

## 2025-01-09 RX ADMIN — OXYCODONE HYDROCHLORIDE AND ACETAMINOPHEN 1 TABLET: 5; 325 TABLET ORAL at 14:52

## 2025-01-09 RX ADMIN — ACETAMINOPHEN 650 MG: 325 TABLET, FILM COATED ORAL at 23:17

## 2025-01-09 RX ADMIN — FLUOXETINE HYDROCHLORIDE 20 MG: 20 CAPSULE ORAL at 12:06

## 2025-01-09 RX ADMIN — SODIUM CHLORIDE, POTASSIUM CHLORIDE, SODIUM LACTATE AND CALCIUM CHLORIDE 125 ML/HR: 600; 310; 30; 20 INJECTION, SOLUTION INTRAVENOUS at 21:24

## 2025-01-09 RX ADMIN — TETROFOSMIN 9.8 MILLICURIE: 0.23 INJECTION, POWDER, LYOPHILIZED, FOR SOLUTION INTRAVENOUS at 09:48

## 2025-01-09 RX ADMIN — BENZTROPINE MESYLATE 2 MG: 2 TABLET ORAL at 12:05

## 2025-01-09 RX ADMIN — HYDROXYZINE HYDROCHLORIDE 25 MG: 25 TABLET ORAL at 20:34

## 2025-01-09 RX ADMIN — ACETAMINOPHEN 650 MG: 325 TABLET, FILM COATED ORAL at 12:05

## 2025-01-09 RX ADMIN — OLANZAPINE 30 MG: 20 TABLET, FILM COATED ORAL at 20:33

## 2025-01-09 RX ADMIN — ROSUVASTATIN CALCIUM 20 MG: 20 TABLET, FILM COATED ORAL at 20:34

## 2025-01-09 RX ADMIN — PANTOPRAZOLE SODIUM 40 MG: 40 TABLET, DELAYED RELEASE ORAL at 06:10

## 2025-01-09 RX ADMIN — LIDOCAINE 4% 2 PATCH: 40 PATCH TOPICAL at 12:04

## 2025-01-09 RX ADMIN — METOPROLOL TARTRATE 100 MG: 50 TABLET, FILM COATED ORAL at 07:58

## 2025-01-09 RX ADMIN — METOPROLOL TARTRATE 100 MG: 50 TABLET, FILM COATED ORAL at 06:09

## 2025-01-09 RX ADMIN — HYDROXYZINE HYDROCHLORIDE 25 MG: 25 TABLET ORAL at 03:50

## 2025-01-09 RX ADMIN — SODIUM CHLORIDE, POTASSIUM CHLORIDE, SODIUM LACTATE AND CALCIUM CHLORIDE 125 ML/HR: 600; 310; 30; 20 INJECTION, SOLUTION INTRAVENOUS at 14:40

## 2025-01-09 RX ADMIN — ACETAMINOPHEN 650 MG: 325 TABLET, FILM COATED ORAL at 06:09

## 2025-01-09 RX ADMIN — Medication 10 MG: at 20:33

## 2025-01-09 RX ADMIN — ACETAMINOPHEN 650 MG: 325 TABLET, FILM COATED ORAL at 18:20

## 2025-01-09 RX ADMIN — BENZTROPINE MESYLATE 2 MG: 2 TABLET ORAL at 20:34

## 2025-01-09 ASSESSMENT — COGNITIVE AND FUNCTIONAL STATUS - GENERAL
MOBILITY SCORE: 23
DAILY ACTIVITIY SCORE: 23
DRESSING REGULAR LOWER BODY CLOTHING: A LITTLE
CLIMB 3 TO 5 STEPS WITH RAILING: A LITTLE

## 2025-01-09 ASSESSMENT — PAIN DESCRIPTION - LOCATION
LOCATION: FACE
LOCATION: BACK
LOCATION: BACK

## 2025-01-09 ASSESSMENT — PAIN SCALES - WONG BAKER
WONGBAKER_NUMERICALRESPONSE: HURTS EVEN MORE
WONGBAKER_NUMERICALRESPONSE: HURTS WHOLE LOT

## 2025-01-09 ASSESSMENT — PAIN SCALES - GENERAL
PAINLEVEL_OUTOF10: 0 - NO PAIN
PAINLEVEL_OUTOF10: 8
PAINLEVEL_OUTOF10: 0 - NO PAIN
PAINLEVEL_OUTOF10: 10 - WORST POSSIBLE PAIN
PAINLEVEL_OUTOF10: 8

## 2025-01-09 ASSESSMENT — PAIN - FUNCTIONAL ASSESSMENT
PAIN_FUNCTIONAL_ASSESSMENT: 0-10
PAIN_FUNCTIONAL_ASSESSMENT: 0-10

## 2025-01-09 NOTE — CONSULTS
ETHICS CONSULTATION NOTE    CONSULT REQUESTER: : MARIA ISABEL Ramey    Received an ethics consult prompted by concerns about a patient without an identifiable surrogate. Briefly reviewed EMR, spoke to MARIA ISABEL Doll and Dr. Ceron; will continue to gather information. Full ethics consult note to follow as needed.     Mr. Cunningham is currently a patient without proxy. Should he lack decisional capacity and decisions about his care that require consent need to be made, please contact the Ethics Consultation Service.     Hermilo Huntley DBE [facility]  Clinical Ethicist

## 2025-01-09 NOTE — PROGRESS NOTES
Transitional Care Coordination Progress Note:  Patient discussed during interdisciplinary rounds.  Team members present: MELISSA GUADARRAMA  Plan per Medical/Surgical team: Pt presenting for cardiac arrest after being found unresponsive by EMS, he is now s/p MICU transfer, and is off pressors/extubated. Cardiology team was consulted for ischemic work up and pt is planned for nuclear stress test today. Psychiatry team was consulted for SI. [facility]  [facility]ayer: [facility]  Status: Inpatient  Discharge disposition: PT initially recommended high intensity therapy but changed their recs to low intensity therapy. Anticipate pt will discharge home with home PT/OT vs outpatient therapy (if agreeable).  Potential Barriers: none  ADOD: 1/10  Care coordinator will continue to follow for discharge planning needs.     Afshin Delaney RN  Transitional Care Coordinator (TCC)  [phone] or [phone]

## 2025-01-09 NOTE — PROGRESS NOTES
Social Work Note:    MARIA ISABEL spoke with [facility] who reported that they were told the patient was agreeable to speaking with them. They reported that they have seen him a few times in the last few days to discuss inpatient substance abuse treatment.  [facility] reported that the patient wont talk to them when they see him. MARIA ISABEL attempted to call the patient but was unable to reach him due to patient's phone being on do not disturb.  MARIA ISABEL will continue to follow  GLENNY Liu, MARIA ISABEL-S

## 2025-01-09 NOTE — RESEARCH NOTES
Artificial Intelligence Monitoring in Nursing (AIMS Nursing) Study    Principle Investigator - Dr. Christiano Wilson  Research Coordinator - Shannon Chang RN     Patient Name - Shamar Cunningham  Date - 1/9/2025 3:23 PM  Location - [place] [address]    Shamar Cunningham was approached by Shannon Chang RN to talk about participating in the AIMS Nursing Study. The patient was not able to be approached, a research coordinator will come back at a later time. Study protocol was followed and patient was given study contact information.     Shannon Chang RN

## 2025-01-09 NOTE — CARE PLAN
Problem: Pain  Goal: Turns in bed with improved pain control throughout the shift  Outcome: Progressing     Problem: Pain  Goal: Walks with improved pain control throughout the shift  Outcome: Progressing     Problem: Pain  Goal: Takes deep breaths with improved pain control throughout the shift  Outcome: Progressing   The patient's goals for the shift include      The clinical goals for the shift include Patient will remain free from falls and injury during shift    Over the shift, the patient did make progress to meet goals set.

## 2025-01-09 NOTE — CONSULTS
Inpatient consult to Cardiology  Consult performed by: Hamida Coyne MD  Consult ordered by: Everett Ceron MD        Inpatient Cardiology Consult Note  Reason for consult:     HPI:   Shamar Cunningham is a 44-year-old male with past medical history of Schizoaffective disorder and substance use disorder who presented to the hospital after being found down with facial fractures and lacerations on January 5.  Patient subsequently had cardiac arrest on 1/6/2025 with ROSC achieved after 1 cycle of CPR.  Was briefly intubated and extubated on 1/7/2025.  After arrest initial TTE showed reduced EF 40 to 45% and repeat echo done 1/07/20/2025 showed improvement of EF to 61% without any wall motion abnormalities.  Cardiology is consulted for ischemic workup in the setting of cardiac arrest.     Patient denies any prior history of syncope, chest pain, shortness of breath or dyspnea on exertion.  Does endorse loss of consciousness after heavy binge drinking episode in the past.  Cardiac arrest this hospitalization was most likely PEA as patient did not receive any shocks and ROSC was quickly achieved.     All system reviewed and negative unless mentioned above.     Cardiac studies:     TTE 1/06/2025   CONCLUSIONS:   1. Left ventricular ejection fraction is mildly decreased, by visual estimate at 40-45%.   2. There is global hypokinesis of the left ventricle with minor regional variations.   3. Spectral Doppler shows a Grade I (impaired relaxation pattern) of left ventricular diastolic filling with normal left atrial filling pressure.   4. There is normal right ventricular global systolic function.   5. Mildly elevated right ventricular systolic pressure.    TTE 1/07/2025      CONCLUSIONS:   1. Left ventricular ejection fraction is normal, calculated by Kc's biplane at 61%.   2. Left ventricular diastolic filling was not assessed.   3. There is normal right ventricular global systolic function.   4. Moderate  tricuspid regurgitation visualized.   5. Moderately elevated right ventricular systolic pressure.   6. Compared with study dated 1/6/2025, the EF appears improved to 60-65% on today's study. Previously there was mildly reduced LV systoic funciton with LVEF 40-45% (global dysfunction). However, the degree of TR appears moderate today rather than trace to mild on prior study.        Current Facility-Administered Medications:     acetaminophen (Tylenol) tablet 650 mg, 650 mg, oral, q6h, Amrit Ortiz MD, 650 mg at 01/09/25 1205    benztropine (Cogentin) tablet 2 mg, 2 mg, oral, BID, Amrit Ortiz MD, 2 mg at 01/09/25 1205    cloNIDine (Catapres) tablet 0.1 mg, 0.1 mg, oral, q6h PRN, Amrit Ortiz MD, 0.1 mg at 01/07/25 2007    enoxaparin (Lovenox) syringe 40 mg, 40 mg, subcutaneous, q24h, Amrit Ortiz MD, 40 mg at 01/09/25 1204    pantoprazole (ProtoNix) EC tablet 40 mg, 40 mg, oral, Daily before breakfast, 40 mg at 01/09/25 0610 **OR** esomeprazole (NexIUM) suspension 40 mg, 40 mg, nasoduodenal tube, Daily before breakfast **OR** pantoprazole (ProtoNix) injection 40 mg, 40 mg, intravenous, Daily before breakfast, Amrit Ortiz MD, 40 mg at 01/07/25 0823    FLUoxetine (PROzac) capsule 20 mg, 20 mg, oral, Daily, Amrit Ortiz MD, 20 mg at 01/09/25 1206    hydrOXYzine HCL (Atarax) tablet 25 mg, 25 mg, oral, q6h PRN, Jacey Cavazos DO, 25 mg at 01/09/25 0350    lactated Ringer's infusion, 125 mL/hr, intravenous, Continuous, Everett Ceron MD, Last Rate: 125 mL/hr at 01/08/25 1834, 125 mL/hr at 01/08/25 1834    lidocaine 4 % patch 2 patch, 2 patch, transdermal, q24h, Everett Ceron MD, 2 patch at 01/09/25 1204    LORazepam (Ativan) injection 0.5 mg, 0.5 mg, intravenous, q5 min PRN, Everett Ceron MD    melatonin tablet 10 mg, 10 mg, oral, Nightly, Jacey Cavazos DO, 10 mg at 01/08/25 2332    OLANZapine (ZyPREXA) tablet 30 mg, 30 mg, oral, Nightly, Amrit Ortiz MD, 30  mg at 01/08/25 2027    oxyCODONE-acetaminophen (Percocet) 5-325 mg per tablet 1 tablet, 1 tablet, oral, q8h PRN, Amrit Ortiz MD, 1 tablet at 01/08/25 2030    rosuvastatin (Crestor) tablet 20 mg, 20 mg, oral, Nightly, Amrit Ortiz MD, 20 mg at 01/08/25 2027    Objective:    Vitals:    01/09/25 1227   BP: 129/85   Pulse: 74   Resp: 18   Temp:    SpO2: 98%       Exam:  General - well appearing   Neck - no  JVD   Chest - CTAB   Cardiac - S1, S2, no murmur heard   Lower ext - No LE edema     Labs/Imaging:     Results from last 72 hours   Lab Units 01/09/25  0720   SODIUM mmol/L 140   POTASSIUM mmol/L 3.7   CO2 mmol/L 29   BUN mg/dL 7   CREATININE mg/dL 1.03   MAGNESIUM mg/dL 1.98   PHOSPHORUS mg/dL 2.6      Results from last 72 hours   Lab Units 01/09/25  1140   WBC AUTO x10*3/uL 11.9*   HEMOGLOBIN g/dL 13.7   PLATELETS AUTO x10*3/uL 161        Assessment and Plan:   44-year-old male with past medical history of Schizoaffective disorder and substance use disorder who is s/p cardiac arrest 1/6/2025.     Assessment:  Patient's cardiac arrest most likely seems to have been from PEA resulting from drug use (PCP use) and seems unlikely to be ischemic in nature. Patient also denies any anginal symptoms or DELEON prior to episode. Also reassured given patient's EF normalized so at this time would not recommend further cardiac testing as this is unlikely to be ischemic in etiology.  No need for further ischemic workup at this time.     Patient discussed with Dr. Ugalde. Cardiology to sign off at this time     Hamida Coyne MD   Cardiology Fellow   PGY5

## 2025-01-10 ENCOUNTER — DOCUMENTATION (OUTPATIENT)
Dept: HOME HEALTH SERVICES | Facility: HOME HEALTH | Age: 45
End: 2025-01-10
Payer: COMMERCIAL

## 2025-01-10 VITALS
OXYGEN SATURATION: 97 % | WEIGHT: 189.15 LBS | TEMPERATURE: 99.1 F | HEART RATE: 83 BPM | BODY MASS INDEX: 26.48 KG/M2 | HEIGHT: 71 IN | RESPIRATION RATE: 18 BRPM | SYSTOLIC BLOOD PRESSURE: 136 MMHG | DIASTOLIC BLOOD PRESSURE: 94 MMHG

## 2025-01-10 PROBLEM — I46.9 CARDIAC ARREST: Status: RESOLVED | Noted: 2025-01-06 | Resolved: 2025-01-10

## 2025-01-10 LAB
ALBUMIN SERPL BCP-MCNC: 3.8 G/DL (ref 3.4–5)
ANION GAP SERPL CALC-SCNC: 13 MMOL/L (ref 10–20)
BASOPHILS # BLD AUTO: 0.03 X10*3/UL (ref 0–0.1)
BASOPHILS NFR BLD AUTO: 0.3 %
BUN SERPL-MCNC: 11 MG/DL (ref 6–23)
CALCIUM SERPL-MCNC: 9.4 MG/DL (ref 8.6–10.6)
CHLORIDE SERPL-SCNC: 105 MMOL/L (ref 98–107)
CK SERPL-CCNC: 1274 U/L (ref 0–325)
CO2 SERPL-SCNC: 27 MMOL/L (ref 21–32)
CREAT SERPL-MCNC: 1.12 MG/DL (ref 0.5–1.3)
EGFRCR SERPLBLD CKD-EPI 2021: 83 ML/MIN/1.73M*2
EOSINOPHIL # BLD AUTO: 0.09 X10*3/UL (ref 0–0.7)
EOSINOPHIL NFR BLD AUTO: 0.8 %
ERYTHROCYTE [DISTWIDTH] IN BLOOD BY AUTOMATED COUNT: 12.9 % (ref 11.5–14.5)
GLUCOSE SERPL-MCNC: 108 MG/DL (ref 74–99)
HCT VFR BLD AUTO: 36.3 % (ref 41–52)
HGB BLD-MCNC: 12.6 G/DL (ref 13.5–17.5)
IMM GRANULOCYTES # BLD AUTO: 0.04 X10*3/UL (ref 0–0.7)
IMM GRANULOCYTES NFR BLD AUTO: 0.3 % (ref 0–0.9)
LYMPHOCYTES # BLD AUTO: 1.62 X10*3/UL (ref 1.2–4.8)
LYMPHOCYTES NFR BLD AUTO: 14.1 %
MAGNESIUM SERPL-MCNC: 1.94 MG/DL (ref 1.6–2.4)
MCH RBC QN AUTO: 31.7 PG (ref 26–34)
MCHC RBC AUTO-ENTMCNC: 34.7 G/DL (ref 32–36)
MCV RBC AUTO: 91 FL (ref 80–100)
MONOCYTES # BLD AUTO: 1.33 X10*3/UL (ref 0.1–1)
MONOCYTES NFR BLD AUTO: 11.6 %
NEUTROPHILS # BLD AUTO: 8.36 X10*3/UL (ref 1.2–7.7)
NEUTROPHILS NFR BLD AUTO: 72.9 %
NRBC BLD-RTO: 0 /100 WBCS (ref 0–0)
PHOSPHATE SERPL-MCNC: 2.9 MG/DL (ref 2.5–4.9)
PLATELET # BLD AUTO: 205 X10*3/UL (ref 150–450)
POTASSIUM SERPL-SCNC: 3.7 MMOL/L (ref 3.5–5.3)
RBC # BLD AUTO: 3.98 X10*6/UL (ref 4.5–5.9)
SODIUM SERPL-SCNC: 141 MMOL/L (ref 136–145)
WBC # BLD AUTO: 11.5 X10*3/UL (ref 4.4–11.3)

## 2025-01-10 PROCEDURE — 2500000001 HC RX 250 WO HCPCS SELF ADMINISTERED DRUGS (ALT 637 FOR MEDICARE OP): Mod: SE | Performed by: INTERNAL MEDICINE

## 2025-01-10 PROCEDURE — 84100 ASSAY OF PHOSPHORUS: CPT | Performed by: INTERNAL MEDICINE

## 2025-01-10 PROCEDURE — 2500000004 HC RX 250 GENERAL PHARMACY W/ HCPCS (ALT 636 FOR OP/ED): Mod: SE | Performed by: INTERNAL MEDICINE

## 2025-01-10 PROCEDURE — 82550 ASSAY OF CK (CPK): CPT | Performed by: INTERNAL MEDICINE

## 2025-01-10 PROCEDURE — 36415 COLL VENOUS BLD VENIPUNCTURE: CPT | Performed by: INTERNAL MEDICINE

## 2025-01-10 PROCEDURE — 85025 COMPLETE CBC W/AUTO DIFF WBC: CPT | Performed by: INTERNAL MEDICINE

## 2025-01-10 PROCEDURE — 99239 HOSP IP/OBS DSCHRG MGMT >30: CPT | Performed by: INTERNAL MEDICINE

## 2025-01-10 PROCEDURE — 83735 ASSAY OF MAGNESIUM: CPT | Performed by: INTERNAL MEDICINE

## 2025-01-10 RX ORDER — ACETAMINOPHEN 325 MG/1
975 TABLET ORAL EVERY 8 HOURS PRN
Qty: 270 TABLET | Refills: 2 | Status: SHIPPED | OUTPATIENT
Start: 2025-01-10

## 2025-01-10 RX ORDER — LIDOCAINE 40 MG/G
CREAM TOPICAL 4 TIMES DAILY PRN
Qty: 120 G | Refills: 3 | Status: SHIPPED | OUTPATIENT
Start: 2025-01-10

## 2025-01-10 RX ADMIN — ACETAMINOPHEN 650 MG: 325 TABLET, FILM COATED ORAL at 06:19

## 2025-01-10 RX ADMIN — SODIUM CHLORIDE, POTASSIUM CHLORIDE, SODIUM LACTATE AND CALCIUM CHLORIDE 125 ML/HR: 600; 310; 30; 20 INJECTION, SOLUTION INTRAVENOUS at 00:32

## 2025-01-10 RX ADMIN — PANTOPRAZOLE SODIUM 40 MG: 40 TABLET, DELAYED RELEASE ORAL at 06:19

## 2025-01-10 RX ADMIN — OXYCODONE HYDROCHLORIDE AND ACETAMINOPHEN 1 TABLET: 5; 325 TABLET ORAL at 08:30

## 2025-01-10 RX ADMIN — ENOXAPARIN SODIUM 40 MG: 100 INJECTION SUBCUTANEOUS at 08:30

## 2025-01-10 RX ADMIN — FLUOXETINE HYDROCHLORIDE 20 MG: 20 CAPSULE ORAL at 08:30

## 2025-01-10 RX ADMIN — BENZTROPINE MESYLATE 2 MG: 2 TABLET ORAL at 08:30

## 2025-01-10 ASSESSMENT — COGNITIVE AND FUNCTIONAL STATUS - GENERAL
DAILY ACTIVITIY SCORE: 23
MOBILITY SCORE: 23
CLIMB 3 TO 5 STEPS WITH RAILING: A LITTLE
DRESSING REGULAR LOWER BODY CLOTHING: A LITTLE

## 2025-01-10 ASSESSMENT — PAIN - FUNCTIONAL ASSESSMENT
PAIN_FUNCTIONAL_ASSESSMENT: 0-10
PAIN_FUNCTIONAL_ASSESSMENT: 0-10

## 2025-01-10 ASSESSMENT — PAIN SCALES - GENERAL: PAINLEVEL_OUTOF10: 10 - WORST POSSIBLE PAIN

## 2025-01-10 ASSESSMENT — PAIN DESCRIPTION - ORIENTATION: ORIENTATION: RIGHT

## 2025-01-10 ASSESSMENT — PAIN DESCRIPTION - LOCATION: LOCATION: EYE

## 2025-01-10 NOTE — CARE PLAN
The patient's goals for the shift include  no falls or injury, hds     The clinical goals for the shift include Patient will remain free from falls and injury during shift      Problem: Skin  Goal: Participates in plan/prevention/treatment measures  1/10/2025 1122 by Néstor Harrison RN  Outcome: Progressing  Flowsheets (Taken 1/10/2025 1122)  Participates in plan/prevention/treatment measures:   Elevate heels   Discuss with provider PT/OT consult  1/10/2025 1122 by Néstor Harrison RN  Outcome: Progressing  Flowsheets (Taken 1/10/2025 1122)  Participates in plan/prevention/treatment measures:   Elevate heels   Discuss with provider PT/OT consult  Goal: Prevent/manage excess moisture  1/10/2025 1122 by Néstor Harrison RN  Outcome: Progressing  Flowsheets (Taken 1/10/2025 1122)  Prevent/manage excess moisture:   Cleanse incontinence/protect with barrier cream   Monitor for/manage infection if present  1/10/2025 1122 by Néstor Harrison RN  Outcome: Progressing  Flowsheets (Taken 1/10/2025 1122)  Prevent/manage excess moisture:   Cleanse incontinence/protect with barrier cream   Monitor for/manage infection if present  Goal: Prevent/minimize sheer/friction injuries  1/10/2025 1122 by Néstor Harrison RN  Outcome: Progressing  Flowsheets (Taken 1/10/2025 1122)  Prevent/minimize sheer/friction injuries:   Complete micro-shifts as needed if patient unable. Adjust patient position to relieve pressure points, not a full turn   Increase activity/out of bed for meals  1/10/2025 1122 by Néstor Harrison RN  Outcome: Progressing  Flowsheets (Taken 1/10/2025 1122)  Prevent/minimize sheer/friction injuries:   Complete micro-shifts as needed if patient unable. Adjust patient position to relieve pressure points, not a full turn   Increase activity/out of bed for meals  Goal: Promote/optimize nutrition  1/10/2025 1122 by Néstor Harrison RN  Outcome:  Progressing  Flowsheets (Taken 1/10/2025 1122)  Promote/optimize nutrition:   Assist with feeding   Monitor/record intake including meals  1/10/2025 1122 by Néstor Harrison RN  Outcome: Progressing  Flowsheets (Taken 1/10/2025 1122)  Promote/optimize nutrition:   Assist with feeding   Monitor/record intake including meals  Goal: Promote skin healing  1/10/2025 1122 by Néstor Harrison RN  Outcome: Progressing  Flowsheets (Taken 1/10/2025 1122)  Promote skin healing:   Assess skin/pad under line(s)/device(s)   Protective dressings over bony prominences  1/10/2025 1122 by Néstor Harrison RN  Outcome: Progressing  Flowsheets (Taken 1/10/2025 1122)  Promote skin healing:   Assess skin/pad under line(s)/device(s)   Protective dressings over bony prominences
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
## 2025-01-10 NOTE — PROGRESS NOTES
01/10/25 1143   Discharge Planning   Who is requesting discharge planning? Provider   Home or Post Acute Services None   Expected Discharge Disposition Home  (per attending pt passed PT reassessment)     Per attending pt passed PT reassessment this morning so he will discharge home today with no additional home going needs. Care coordinator will continue to follow for discharge planning needs.    Afshin Delaney RN  Transitional Care Coordinator (TCC)  [phone] or [phone]

## 2025-01-10 NOTE — NURSING NOTE
Discharge Note:    The VN went over AVS with Pt. Pt agreed to information. Pts IV were removed and intact by floor nurse. Pt has all belongings at the bedside. Pt had no questions or concerns. Pt is awaiting discharge.

## 2025-01-12 LAB
BACTERIA BLD CULT: NORMAL
BACTERIA BLD CULT: NORMAL

## 2025-01-13 ENCOUNTER — CLINICAL SUPPORT (OUTPATIENT)
Dept: EMERGENCY MEDICINE | Facility: HOSPITAL | Age: 45
End: 2025-01-13
Payer: COMMERCIAL

## 2025-01-13 ENCOUNTER — APPOINTMENT (OUTPATIENT)
Dept: RADIOLOGY | Facility: HOSPITAL | Age: 45
End: 2025-01-13
Payer: COMMERCIAL

## 2025-01-13 ENCOUNTER — HOSPITAL ENCOUNTER (INPATIENT)
Facility: HOSPITAL | Age: 45
LOS: 1 days | Discharge: HOME | End: 2025-01-15
Attending: EMERGENCY MEDICINE | Admitting: STUDENT IN AN ORGANIZED HEALTH CARE EDUCATION/TRAINING PROGRAM
Payer: COMMERCIAL

## 2025-01-13 DIAGNOSIS — Z86.79 HISTORY OF HYPERTENSION: ICD-10-CM

## 2025-01-13 DIAGNOSIS — F25.9 SCHIZOAFFECTIVE DISORDER, UNSPECIFIED TYPE: Chronic | ICD-10-CM

## 2025-01-13 DIAGNOSIS — N40.0 BENIGN PROSTATIC HYPERPLASIA, UNSPECIFIED WHETHER LOWER URINARY TRACT SYMPTOMS PRESENT: ICD-10-CM

## 2025-01-13 DIAGNOSIS — R45.851 SUICIDAL IDEATION: Primary | ICD-10-CM
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
## 2025-01-13 LAB
ALBUMIN SERPL BCP-MCNC: 3.6 G/DL (ref 3.4–5)
ALP SERPL-CCNC: 68 U/L (ref 33–120)
ALT SERPL W P-5'-P-CCNC: 49 U/L (ref 10–52)
AMPHETAMINES UR QL SCN: ABNORMAL
ANION GAP BLDV CALCULATED.4IONS-SCNC: 10 MMOL/L (ref 10–25)
ANION GAP SERPL CALC-SCNC: 12 MMOL/L (ref 10–20)
APAP SERPL-MCNC: <10 UG/ML
APPEARANCE UR: CLEAR
AST SERPL W P-5'-P-CCNC: 39 U/L (ref 9–39)
BACTERIA BLD CULT: NORMAL
BACTERIA BLD CULT: NORMAL
BARBITURATES UR QL SCN: ABNORMAL
BASE EXCESS BLDV CALC-SCNC: 1 MMOL/L (ref -2–3)
BASOPHILS # BLD AUTO: 0.04 X10*3/UL (ref 0–0.1)
BASOPHILS NFR BLD AUTO: 0.5 %
BENZODIAZ UR QL SCN: ABNORMAL
BILIRUB SERPL-MCNC: 0.3 MG/DL (ref 0–1.2)
BILIRUB UR STRIP.AUTO-MCNC: NEGATIVE MG/DL
BODY TEMPERATURE: 37 DEGREES CELSIUS
BUN SERPL-MCNC: 13 MG/DL (ref 6–23)
BZE UR QL SCN: ABNORMAL
CA-I BLDV-SCNC: 1.22 MMOL/L (ref 1.1–1.33)
CALCIUM SERPL-MCNC: 9.3 MG/DL (ref 8.6–10.6)
CANNABINOIDS UR QL SCN: ABNORMAL
CARDIAC TROPONIN I PNL SERPL HS: 6 NG/L (ref 0–53)
CHLORIDE BLDV-SCNC: 101 MMOL/L (ref 98–107)
CHLORIDE SERPL-SCNC: 101 MMOL/L (ref 98–107)
CK SERPL-CCNC: 466 U/L (ref 0–325)
CO2 SERPL-SCNC: 26 MMOL/L (ref 21–32)
COLOR UR: NORMAL
CREAT SERPL-MCNC: 1.13 MG/DL (ref 0.5–1.3)
EGFRCR SERPLBLD CKD-EPI 2021: 82 ML/MIN/1.73M*2
EOSINOPHIL # BLD AUTO: 0.15 X10*3/UL (ref 0–0.7)
EOSINOPHIL NFR BLD AUTO: 1.8 %
ERYTHROCYTE [DISTWIDTH] IN BLOOD BY AUTOMATED COUNT: 12.4 % (ref 11.5–14.5)
ETHANOL SERPL-MCNC: <10 MG/DL
FENTANYL+NORFENTANYL UR QL SCN: ABNORMAL
FLUAV RNA RESP QL NAA+PROBE: NOT DETECTED
FLUBV RNA RESP QL NAA+PROBE: NOT DETECTED
GLUCOSE BLDV-MCNC: 147 MG/DL (ref 74–99)
GLUCOSE SERPL-MCNC: 135 MG/DL (ref 74–99)
GLUCOSE UR STRIP.AUTO-MCNC: NORMAL MG/DL
HCO3 BLDV-SCNC: 25.2 MMOL/L (ref 22–26)
HCT VFR BLD AUTO: 33.1 % (ref 41–52)
HCT VFR BLD EST: 39 % (ref 41–52)
HGB BLD-MCNC: 12.1 G/DL (ref 13.5–17.5)
HGB BLDV-MCNC: 13 G/DL (ref 13.5–17.5)
HOLD SPECIMEN: NORMAL
HOLD SPECIMEN: NORMAL
IMM GRANULOCYTES # BLD AUTO: 0.04 X10*3/UL (ref 0–0.7)
IMM GRANULOCYTES NFR BLD AUTO: 0.5 % (ref 0–0.9)
KETONES UR STRIP.AUTO-MCNC: NEGATIVE MG/DL
LACTATE BLDV-SCNC: 1.3 MMOL/L (ref 0.4–2)
LEUKOCYTE ESTERASE UR QL STRIP.AUTO: NEGATIVE
LIPASE SERPL-CCNC: 47 U/L (ref 9–82)
LYMPHOCYTES # BLD AUTO: 2.72 X10*3/UL (ref 1.2–4.8)
LYMPHOCYTES NFR BLD AUTO: 33.3 %
MAGNESIUM SERPL-MCNC: 2.37 MG/DL (ref 1.6–2.4)
MCH RBC QN AUTO: 31.8 PG (ref 26–34)
MCHC RBC AUTO-ENTMCNC: 36.6 G/DL (ref 32–36)
MCV RBC AUTO: 87 FL (ref 80–100)
METHADONE UR QL SCN: ABNORMAL
MONOCYTES # BLD AUTO: 0.95 X10*3/UL (ref 0.1–1)
MONOCYTES NFR BLD AUTO: 11.6 %
NEUTROPHILS # BLD AUTO: 4.27 X10*3/UL (ref 1.2–7.7)
NEUTROPHILS NFR BLD AUTO: 52.3 %
NITRITE UR QL STRIP.AUTO: NEGATIVE
NRBC BLD-RTO: 0 /100 WBCS (ref 0–0)
OPIATES UR QL SCN: ABNORMAL
OXYCODONE+OXYMORPHONE UR QL SCN: ABNORMAL
OXYHGB MFR BLDV: 93.2 % (ref 45–75)
PCO2 BLDV: 38 MM HG (ref 41–51)
PCP UR QL SCN: ABNORMAL
PH BLDV: 7.43 PH (ref 7.33–7.43)
PH UR STRIP.AUTO: 6 [PH]
PLATELET # BLD AUTO: 360 X10*3/UL (ref 150–450)
PO2 BLDV: 78 MM HG (ref 35–45)
POTASSIUM BLDV-SCNC: 4.1 MMOL/L (ref 3.5–5.3)
POTASSIUM SERPL-SCNC: 3.9 MMOL/L (ref 3.5–5.3)
PROT SERPL-MCNC: 6.9 G/DL (ref 6.4–8.2)
PROT UR STRIP.AUTO-MCNC: NEGATIVE MG/DL
RBC # BLD AUTO: 3.8 X10*6/UL (ref 4.5–5.9)
RBC # UR STRIP.AUTO: NEGATIVE /UL
SALICYLATES SERPL-MCNC: <3 MG/DL
SAO2 % BLDV: 96 % (ref 45–75)
SARS-COV-2 RNA RESP QL NAA+PROBE: NOT DETECTED
SODIUM BLDV-SCNC: 132 MMOL/L (ref 136–145)
SODIUM SERPL-SCNC: 135 MMOL/L (ref 136–145)
SP GR UR STRIP.AUTO: 1.01
UROBILINOGEN UR STRIP.AUTO-MCNC: NORMAL MG/DL
WBC # BLD AUTO: 8.2 X10*3/UL (ref 4.4–11.3)

## 2025-01-13 PROCEDURE — 2500000004 HC RX 250 GENERAL PHARMACY W/ HCPCS (ALT 636 FOR OP/ED): Mod: SE

## 2025-01-13 PROCEDURE — 72131 CT LUMBAR SPINE W/O DYE: CPT | Mod: RCN

## 2025-01-13 PROCEDURE — 36415 COLL VENOUS BLD VENIPUNCTURE: CPT

## 2025-01-13 PROCEDURE — 80320 DRUG SCREEN QUANTALCOHOLS: CPT

## 2025-01-13 PROCEDURE — 84484 ASSAY OF TROPONIN QUANT: CPT

## 2025-01-13 PROCEDURE — 2550000001 HC RX 255 CONTRASTS: Mod: SE | Performed by: EMERGENCY MEDICINE

## 2025-01-13 PROCEDURE — 83735 ASSAY OF MAGNESIUM: CPT | Performed by: EMERGENCY MEDICINE

## 2025-01-13 PROCEDURE — 82435 ASSAY OF BLOOD CHLORIDE: CPT

## 2025-01-13 PROCEDURE — 76377 3D RENDER W/INTRP POSTPROCES: CPT

## 2025-01-13 PROCEDURE — 85025 COMPLETE CBC W/AUTO DIFF WBC: CPT

## 2025-01-13 PROCEDURE — 71260 CT THORAX DX C+: CPT | Performed by: STUDENT IN AN ORGANIZED HEALTH CARE EDUCATION/TRAINING PROGRAM

## 2025-01-13 PROCEDURE — 82550 ASSAY OF CK (CPK): CPT | Performed by: EMERGENCY MEDICINE

## 2025-01-13 PROCEDURE — 99285 EMERGENCY DEPT VISIT HI MDM: CPT | Mod: 25 | Performed by: EMERGENCY MEDICINE

## 2025-01-13 PROCEDURE — 76377 3D RENDER W/INTRP POSTPROCES: CPT | Performed by: STUDENT IN AN ORGANIZED HEALTH CARE EDUCATION/TRAINING PROGRAM

## 2025-01-13 PROCEDURE — 70486 CT MAXILLOFACIAL W/O DYE: CPT

## 2025-01-13 PROCEDURE — 51702 INSERT TEMP BLADDER CATH: CPT

## 2025-01-13 PROCEDURE — 71260 CT THORAX DX C+: CPT

## 2025-01-13 PROCEDURE — 72128 CT CHEST SPINE W/O DYE: CPT | Performed by: STUDENT IN AN ORGANIZED HEALTH CARE EDUCATION/TRAINING PROGRAM

## 2025-01-13 PROCEDURE — 96374 THER/PROPH/DIAG INJ IV PUSH: CPT | Mod: 59

## 2025-01-13 PROCEDURE — 72131 CT LUMBAR SPINE W/O DYE: CPT | Performed by: STUDENT IN AN ORGANIZED HEALTH CARE EDUCATION/TRAINING PROGRAM

## 2025-01-13 PROCEDURE — 83690 ASSAY OF LIPASE: CPT

## 2025-01-13 PROCEDURE — 51798 US URINE CAPACITY MEASURE: CPT

## 2025-01-13 PROCEDURE — 80307 DRUG TEST PRSMV CHEM ANLYZR: CPT

## 2025-01-13 PROCEDURE — 81003 URINALYSIS AUTO W/O SCOPE: CPT | Performed by: EMERGENCY MEDICINE

## 2025-01-13 PROCEDURE — 72125 CT NECK SPINE W/O DYE: CPT | Performed by: STUDENT IN AN ORGANIZED HEALTH CARE EDUCATION/TRAINING PROGRAM

## 2025-01-13 PROCEDURE — 93005 ELECTROCARDIOGRAM TRACING: CPT

## 2025-01-13 PROCEDURE — 82805 BLOOD GASES W/O2 SATURATION: CPT

## 2025-01-13 PROCEDURE — 72125 CT NECK SPINE W/O DYE: CPT

## 2025-01-13 PROCEDURE — 70450 CT HEAD/BRAIN W/O DYE: CPT

## 2025-01-13 PROCEDURE — 74177 CT ABD & PELVIS W/CONTRAST: CPT | Performed by: STUDENT IN AN ORGANIZED HEALTH CARE EDUCATION/TRAINING PROGRAM

## 2025-01-13 PROCEDURE — 72128 CT CHEST SPINE W/O DYE: CPT | Mod: RCN

## 2025-01-13 PROCEDURE — 87636 SARSCOV2 & INF A&B AMP PRB: CPT

## 2025-01-13 PROCEDURE — 70450 CT HEAD/BRAIN W/O DYE: CPT | Performed by: STUDENT IN AN ORGANIZED HEALTH CARE EDUCATION/TRAINING PROGRAM

## 2025-01-13 PROCEDURE — 70486 CT MAXILLOFACIAL W/O DYE: CPT | Performed by: STUDENT IN AN ORGANIZED HEALTH CARE EDUCATION/TRAINING PROGRAM

## 2025-01-13 PROCEDURE — 2500000001 HC RX 250 WO HCPCS SELF ADMINISTERED DRUGS (ALT 637 FOR MEDICARE OP): Mod: SE | Performed by: EMERGENCY MEDICINE

## 2025-01-13 PROCEDURE — 99285 EMERGENCY DEPT VISIT HI MDM: CPT | Performed by: EMERGENCY MEDICINE

## 2025-01-13 RX ORDER — MORPHINE SULFATE 4 MG/ML
4 INJECTION INTRAVENOUS ONCE
Status: COMPLETED | OUTPATIENT
Start: 2025-01-13 | End: 2025-01-13

## 2025-01-13 RX ORDER — OXYCODONE AND ACETAMINOPHEN 5; 325 MG/1; MG/1
1 TABLET ORAL ONCE
Status: COMPLETED | OUTPATIENT
Start: 2025-01-13 | End: 2025-01-13

## 2025-01-13 RX ORDER — ACETAMINOPHEN 325 MG/1
650 TABLET ORAL ONCE
Status: DISCONTINUED | OUTPATIENT
Start: 2025-01-13 | End: 2025-01-13

## 2025-01-13 RX ORDER — OXYCODONE AND ACETAMINOPHEN 5; 325 MG/1; MG/1
1 TABLET ORAL ONCE
Status: COMPLETED | OUTPATIENT
Start: 2025-01-13 | End: 2025-01-14

## 2025-01-13 RX ADMIN — IOHEXOL 100 ML: 350 INJECTION, SOLUTION INTRAVENOUS at 21:00

## 2025-01-13 RX ADMIN — MORPHINE SULFATE 4 MG: 4 INJECTION INTRAVENOUS at 21:18

## 2025-01-13 RX ADMIN — OXYCODONE HYDROCHLORIDE AND ACETAMINOPHEN 1 TABLET: 5; 325 TABLET ORAL at 18:15

## 2025-01-13 SDOH — HEALTH STABILITY: MENTAL HEALTH: BEHAVIORAL HEALTH(WDL): EXCEPTIONS TO WDL

## 2025-01-13 SDOH — HEALTH STABILITY: MENTAL HEALTH: SUICIDE ASSESSMENT: ADULT (C-SSRS)

## 2025-01-13 SDOH — HEALTH STABILITY: MENTAL HEALTH

## 2025-01-13 SDOH — HEALTH STABILITY: MENTAL HEALTH: FOR HIGH RISK PATIENTS: 1:1 PATIENT OBSERVER AT ALL TIMES

## 2025-01-13 SDOH — HEALTH STABILITY: MENTAL HEALTH: HAVE YOU BEEN THINKING ABOUT HOW YOU MIGHT DO THIS?: YES

## 2025-01-13 SDOH — HEALTH STABILITY: MENTAL HEALTH: HAVE YOU ACTUALLY HAD ANY THOUGHTS OF KILLING YOURSELF?: YES

## 2025-01-13 SDOH — HEALTH STABILITY: MENTAL HEALTH: BEHAVIORS/MOOD: APPROPRIATE FOR SITUATION;CALM;COOPERATIVE;IMPULSIVE

## 2025-01-13 SDOH — HEALTH STABILITY: MENTAL HEALTH: HAVE YOU HAD THESE THOUGHTS AND HAD SOME INTENTION OF ACTING ON THEM?: YES

## 2025-01-13 SDOH — HEALTH STABILITY: MENTAL HEALTH: BEHAVIORS/MOOD: FLIGHT OF IDEAS

## 2025-01-13 SDOH — HEALTH STABILITY: MENTAL HEALTH: CONTENT: UNREMARKABLE

## 2025-01-13 SDOH — HEALTH STABILITY: MENTAL HEALTH: HAVE YOU WISHED YOU WERE DEAD OR WISHED YOU COULD GO TO SLEEP AND NOT WAKE UP?: YES

## 2025-01-13 SDOH — HEALTH STABILITY: MENTAL HEALTH
OTHER SUICIDE PRECAUTIONS INCLUDE: PATIENT PLACED IN AN EASILY OBSERVABLE ROOM WITH DOOR/CURTAIN REMAINING OPEN;PATIENT PLACED IN GOWN (SNAPS OR PAPER GOWNS PREFERRED) AND WANDED;PROVIDER NOTIFIED;ELOPEMENT RISK IDENTIFIED;HOURLY BEHAVIORAL ASSESSMENT PERFORMED;PERSONAL BELONGINGS SECURED

## 2025-01-13 SDOH — HEALTH STABILITY: MENTAL HEALTH: HAVE YOU EVER DONE ANYTHING, STARTED TO DO ANYTHING, OR PREPARED TO DO ANYTHING TO END YOUR LIFE?: NO

## 2025-01-13 ASSESSMENT — PAIN DESCRIPTION - LOCATION
LOCATION: MOUTH
LOCATION: ABDOMEN

## 2025-01-13 ASSESSMENT — COLUMBIA-SUICIDE SEVERITY RATING SCALE - C-SSRS
4. HAVE YOU HAD THESE THOUGHTS AND HAD SOME INTENTION OF ACTING ON THEM?: YES
2. HAVE YOU ACTUALLY HAD ANY THOUGHTS OF KILLING YOURSELF?: YES
6. HAVE YOU EVER DONE ANYTHING, STARTED TO DO ANYTHING, OR PREPARED TO DO ANYTHING TO END YOUR LIFE?: YES
1. IN THE PAST MONTH, HAVE YOU WISHED YOU WERE DEAD OR WISHED YOU COULD GO TO SLEEP AND NOT WAKE UP?: YES
6. HAVE YOU EVER DONE ANYTHING, STARTED TO DO ANYTHING, OR PREPARED TO DO ANYTHING TO END YOUR LIFE?: YES
5. HAVE YOU STARTED TO WORK OUT OR WORKED OUT THE DETAILS OF HOW TO KILL YOURSELF? DO YOU INTEND TO CARRY OUT THIS PLAN?: YES

## 2025-01-13 ASSESSMENT — LIFESTYLE VARIABLES
EVER FELT BAD OR GUILTY ABOUT YOUR DRINKING: NO
HAVE PEOPLE ANNOYED YOU BY CRITICIZING YOUR DRINKING: NO
EVER HAD A DRINK FIRST THING IN THE MORNING TO STEADY YOUR NERVES TO GET RID OF A HANGOVER: NO
TOTAL SCORE: 0
HAVE YOU EVER FELT YOU SHOULD CUT DOWN ON YOUR DRINKING: NO

## 2025-01-13 ASSESSMENT — PAIN SCALES - GENERAL
PAINLEVEL_OUTOF10: 0 - NO PAIN
PAINLEVEL_OUTOF10: 5 - MODERATE PAIN
PAINLEVEL_OUTOF10: 0 - NO PAIN
PAINLEVEL_OUTOF10: 10 - WORST POSSIBLE PAIN
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
## 2025-01-13 NOTE — ED TRIAGE NOTES
Patient came to ED with c/o pain with urination. Patient reports that he fell on concrete floor on 01/06 and was seen at [facility]. Pt reports facial pain. Patient additionally reports SI, states [place] swim. Pt paranoid that someone is going to kill him. Pt appears internally stimulated.

## 2025-01-14 PROBLEM — F17.200 TOBACCO DEPENDENCE: Status: ACTIVE | Noted: 2023-12-11

## 2025-01-14 PROBLEM — A60.00 GENITAL HERPES SIMPLEX: Status: ACTIVE | Noted: 2025-01-14

## 2025-01-14 PROBLEM — R45.851 SUICIDAL IDEATION: Status: ACTIVE | Noted: 2021-10-16

## 2025-01-14 PROBLEM — F31.9 BIPOLAR DISORDER, UNSPECIFIED (MULTI): Chronic | Status: ACTIVE | Noted: 2022-09-09

## 2025-01-14 PROBLEM — J45.20 MILD INTERMITTENT ASTHMA: Status: ACTIVE | Noted: 2025-01-14

## 2025-01-14 PROBLEM — F29 PSYCHOSIS (MULTI): Status: ACTIVE | Noted: 2025-01-14

## 2025-01-14 PROBLEM — Z59.819 HOUSING INSECURITY: Status: ACTIVE | Noted: 2024-01-30

## 2025-01-14 PROBLEM — G80.9 CEREBRAL PALSY: Chronic | Status: ACTIVE | Noted: 2023-10-29

## 2025-01-14 PROBLEM — E78.5 HYPERLIPIDEMIA: Status: ACTIVE | Noted: 2020-07-26

## 2025-01-14 PROBLEM — F41.9 ANXIETY DISORDER: Status: ACTIVE | Noted: 2022-08-25

## 2025-01-14 PROBLEM — Z86.79 HISTORY OF HYPERTENSION: Status: ACTIVE | Noted: 2021-09-14

## 2025-01-14 PROBLEM — F12.90 CANNABIS USE DISORDER: Status: ACTIVE | Noted: 2025-01-14

## 2025-01-14 PROBLEM — K21.9 GERD (GASTROESOPHAGEAL REFLUX DISEASE): Status: ACTIVE | Noted: 2025-01-14

## 2025-01-14 PROBLEM — F19.99: Status: ACTIVE | Noted: 2025-01-14

## 2025-01-14 PROBLEM — F25.9 SCHIZOAFFECTIVE DISORDER (MULTI): Chronic | Status: ACTIVE | Noted: 2020-05-18

## 2025-01-14 PROBLEM — F16.20: Status: ACTIVE | Noted: 2025-01-14

## 2025-01-14 PROBLEM — F14.10 COCAINE ABUSE: Status: ACTIVE | Noted: 2025-01-14

## 2025-01-14 PROBLEM — F60.3 BORDERLINE PERSONALITY DISORDER (MULTI): Status: ACTIVE | Noted: 2022-09-09

## 2025-01-14 LAB
ALBUMIN SERPL BCP-MCNC: 3.1 G/DL (ref 3.4–5)
ANION GAP SERPL CALC-SCNC: 12 MMOL/L (ref 10–20)
BUN SERPL-MCNC: 11 MG/DL (ref 6–23)
CALCIUM SERPL-MCNC: 8.5 MG/DL (ref 8.6–10.6)
CHLORIDE SERPL-SCNC: 105 MMOL/L (ref 98–107)
CK SERPL-CCNC: 326 U/L (ref 0–325)
CO2 SERPL-SCNC: 23 MMOL/L (ref 21–32)
CREAT SERPL-MCNC: 0.93 MG/DL (ref 0.5–1.3)
EGFRCR SERPLBLD CKD-EPI 2021: >90 ML/MIN/1.73M*2
ERYTHROCYTE [DISTWIDTH] IN BLOOD BY AUTOMATED COUNT: 12.7 % (ref 11.5–14.5)
GLUCOSE SERPL-MCNC: 110 MG/DL (ref 74–99)
HCT VFR BLD AUTO: 34.3 % (ref 41–52)
HGB BLD-MCNC: 12.1 G/DL (ref 13.5–17.5)
HOLD SPECIMEN: NORMAL
MAGNESIUM SERPL-MCNC: 2.32 MG/DL (ref 1.6–2.4)
MCH RBC QN AUTO: 30.9 PG (ref 26–34)
MCHC RBC AUTO-ENTMCNC: 35.3 G/DL (ref 32–36)
MCV RBC AUTO: 88 FL (ref 80–100)
NRBC BLD-RTO: 0 /100 WBCS (ref 0–0)
PHOSPHATE SERPL-MCNC: 4.2 MG/DL (ref 2.5–4.9)
PLATELET # BLD AUTO: 270 X10*3/UL (ref 150–450)
POTASSIUM SERPL-SCNC: 4.1 MMOL/L (ref 3.5–5.3)
RBC # BLD AUTO: 3.92 X10*6/UL (ref 4.5–5.9)
SODIUM SERPL-SCNC: 136 MMOL/L (ref 136–145)
WBC # BLD AUTO: 8 X10*3/UL (ref 4.4–11.3)

## 2025-01-14 PROCEDURE — 36415 COLL VENOUS BLD VENIPUNCTURE: CPT | Performed by: NURSE PRACTITIONER

## 2025-01-14 PROCEDURE — 99232 SBSQ HOSP IP/OBS MODERATE 35: CPT | Performed by: STUDENT IN AN ORGANIZED HEALTH CARE EDUCATION/TRAINING PROGRAM

## 2025-01-14 PROCEDURE — 99223 1ST HOSP IP/OBS HIGH 75: CPT | Performed by: NURSE PRACTITIONER

## 2025-01-14 PROCEDURE — 2500000001 HC RX 250 WO HCPCS SELF ADMINISTERED DRUGS (ALT 637 FOR MEDICARE OP): Mod: SE | Performed by: STUDENT IN AN ORGANIZED HEALTH CARE EDUCATION/TRAINING PROGRAM

## 2025-01-14 PROCEDURE — 2500000001 HC RX 250 WO HCPCS SELF ADMINISTERED DRUGS (ALT 637 FOR MEDICARE OP): Mod: SE | Performed by: NURSE PRACTITIONER

## 2025-01-14 PROCEDURE — 2500000002 HC RX 250 W HCPCS SELF ADMINISTERED DRUGS (ALT 637 FOR MEDICARE OP, ALT 636 FOR OP/ED): Mod: SE | Performed by: NURSE PRACTITIONER

## 2025-01-14 PROCEDURE — 1100000001 HC PRIVATE ROOM DAILY

## 2025-01-14 PROCEDURE — 85027 COMPLETE CBC AUTOMATED: CPT | Performed by: NURSE PRACTITIONER

## 2025-01-14 PROCEDURE — 80069 RENAL FUNCTION PANEL: CPT | Performed by: NURSE PRACTITIONER

## 2025-01-14 PROCEDURE — 2500000004 HC RX 250 GENERAL PHARMACY W/ HCPCS (ALT 636 FOR OP/ED): Mod: SE | Performed by: NURSE PRACTITIONER

## 2025-01-14 PROCEDURE — 82550 ASSAY OF CK (CPK): CPT | Performed by: NURSE PRACTITIONER

## 2025-01-14 PROCEDURE — 2500000001 HC RX 250 WO HCPCS SELF ADMINISTERED DRUGS (ALT 637 FOR MEDICARE OP): Mod: SE

## 2025-01-14 PROCEDURE — 83735 ASSAY OF MAGNESIUM: CPT | Performed by: NURSE PRACTITIONER

## 2025-01-14 RX ORDER — ACETAMINOPHEN 325 MG/1
975 TABLET ORAL EVERY 6 HOURS PRN
Status: DISCONTINUED | OUTPATIENT
Start: 2025-01-14 | End: 2025-01-15 | Stop reason: HOSPADM

## 2025-01-14 RX ORDER — TAMSULOSIN HYDROCHLORIDE 0.4 MG/1
0.4 CAPSULE ORAL DAILY
Status: DISCONTINUED | OUTPATIENT
Start: 2025-01-14 | End: 2025-01-15 | Stop reason: HOSPADM

## 2025-01-14 RX ORDER — BENZTROPINE MESYLATE 2 MG/1
2 TABLET ORAL 2 TIMES DAILY
Qty: 60 TABLET | Refills: 0 | Status: SHIPPED | OUTPATIENT
Start: 2025-01-14 | End: 2025-02-13

## 2025-01-14 RX ORDER — ACETAMINOPHEN 500 MG
5 TABLET ORAL NIGHTLY PRN
Status: DISCONTINUED | OUTPATIENT
Start: 2025-01-14 | End: 2025-01-15 | Stop reason: HOSPADM

## 2025-01-14 RX ORDER — HALOPERIDOL 5 MG/ML
5 INJECTION INTRAMUSCULAR EVERY 6 HOURS PRN
Status: CANCELLED | OUTPATIENT
Start: 2025-01-14

## 2025-01-14 RX ORDER — OXYCODONE HYDROCHLORIDE 5 MG/1
5 TABLET ORAL EVERY 6 HOURS PRN
Status: DISCONTINUED | OUTPATIENT
Start: 2025-01-14 | End: 2025-01-14

## 2025-01-14 RX ORDER — TRAZODONE HYDROCHLORIDE 100 MG/1
100 TABLET ORAL NIGHTLY
COMMUNITY
Start: 2013-03-06 | End: 2025-01-14 | Stop reason: ALTCHOICE

## 2025-01-14 RX ORDER — MULTIVITAMIN
1 TABLET ORAL DAILY
COMMUNITY
Start: 2024-10-01 | End: 2025-01-14 | Stop reason: SDUPTHER

## 2025-01-14 RX ORDER — ROSUVASTATIN CALCIUM 20 MG/1
20 TABLET, COATED ORAL NIGHTLY
Status: DISCONTINUED | OUTPATIENT
Start: 2025-01-14 | End: 2025-01-15 | Stop reason: HOSPADM

## 2025-01-14 RX ORDER — LORAZEPAM 0.5 MG/1
2 TABLET ORAL EVERY 6 HOURS PRN
Status: CANCELLED | OUTPATIENT
Start: 2025-01-14

## 2025-01-14 RX ORDER — LORAZEPAM 2 MG/ML
2 INJECTION INTRAMUSCULAR EVERY 6 HOURS PRN
Status: CANCELLED | OUTPATIENT
Start: 2025-01-14

## 2025-01-14 RX ORDER — HALOPERIDOL 5 MG/1
5 TABLET ORAL EVERY 6 HOURS PRN
Status: CANCELLED | OUTPATIENT
Start: 2025-01-14

## 2025-01-14 RX ORDER — BENZTROPINE MESYLATE 2 MG/1
2 TABLET ORAL 2 TIMES DAILY
Status: DISCONTINUED | OUTPATIENT
Start: 2025-01-14 | End: 2025-01-15 | Stop reason: HOSPADM

## 2025-01-14 RX ORDER — MULTIVIT-MIN/IRON FUM/FOLIC AC 7.5 MG-4
1 TABLET ORAL DAILY
Status: DISCONTINUED | OUTPATIENT
Start: 2025-01-14 | End: 2025-01-15 | Stop reason: HOSPADM

## 2025-01-14 RX ORDER — FLUOXETINE HYDROCHLORIDE 20 MG/1
20 CAPSULE ORAL DAILY
Status: DISCONTINUED | OUTPATIENT
Start: 2025-01-14 | End: 2025-01-15 | Stop reason: HOSPADM

## 2025-01-14 RX ORDER — OXYCODONE HYDROCHLORIDE 5 MG/1
5 TABLET ORAL EVERY 6 HOURS PRN
Status: DISPENSED | OUTPATIENT
Start: 2025-01-14 | End: 2025-01-15

## 2025-01-14 RX ORDER — ATORVASTATIN CALCIUM 20 MG/1
1 TABLET, FILM COATED ORAL DAILY
COMMUNITY
End: 2025-01-14 | Stop reason: ALTCHOICE

## 2025-01-14 RX ORDER — ALBUTEROL SULFATE 90 UG/1
1 INHALANT RESPIRATORY (INHALATION) EVERY 4 HOURS PRN
Status: DISCONTINUED | OUTPATIENT
Start: 2025-01-14 | End: 2025-01-15 | Stop reason: HOSPADM

## 2025-01-14 RX ORDER — ENOXAPARIN SODIUM 100 MG/ML
40 INJECTION SUBCUTANEOUS EVERY 24 HOURS
Status: DISCONTINUED | OUTPATIENT
Start: 2025-01-14 | End: 2025-01-15 | Stop reason: HOSPADM

## 2025-01-14 RX ORDER — OLANZAPINE 15 MG/1
30 TABLET ORAL NIGHTLY
Qty: 60 TABLET | Refills: 0 | Status: SHIPPED | OUTPATIENT
Start: 2025-01-14 | End: 2025-02-13

## 2025-01-14 RX ORDER — POLYETHYLENE GLYCOL 3350 17 G/17G
17 POWDER, FOR SOLUTION ORAL DAILY PRN
Status: DISCONTINUED | OUTPATIENT
Start: 2025-01-14 | End: 2025-01-15 | Stop reason: HOSPADM

## 2025-01-14 RX ORDER — OLANZAPINE 5 MG/1
30 TABLET ORAL NIGHTLY
Status: DISCONTINUED | OUTPATIENT
Start: 2025-01-14 | End: 2025-01-15 | Stop reason: HOSPADM

## 2025-01-14 RX ORDER — TAMSULOSIN HYDROCHLORIDE 0.4 MG/1
0.4 CAPSULE ORAL DAILY
Qty: 30 CAPSULE | Refills: 0 | Status: SHIPPED | OUTPATIENT
Start: 2025-01-15 | End: 2025-02-14

## 2025-01-14 RX ORDER — LIDOCAINE 40 MG/G
CREAM TOPICAL 4 TIMES DAILY PRN
Status: DISCONTINUED | OUTPATIENT
Start: 2025-01-14 | End: 2025-01-15 | Stop reason: HOSPADM

## 2025-01-14 RX ORDER — FLUOXETINE HYDROCHLORIDE 20 MG/1
20 CAPSULE ORAL DAILY
Qty: 30 CAPSULE | Refills: 0 | Status: SHIPPED | OUTPATIENT
Start: 2025-01-14 | End: 2025-02-13

## 2025-01-14 RX ORDER — OXYCODONE HYDROCHLORIDE 5 MG/1
5 TABLET ORAL EVERY 6 HOURS PRN
Qty: 8 TABLET | Refills: 0 | Status: SHIPPED | OUTPATIENT
Start: 2025-01-14 | End: 2025-01-16

## 2025-01-14 RX ORDER — ROSUVASTATIN CALCIUM 20 MG/1
20 TABLET, COATED ORAL NIGHTLY
Qty: 30 TABLET | Refills: 0 | Status: SHIPPED | OUTPATIENT
Start: 2025-01-14 | End: 2025-02-13

## 2025-01-14 RX ADMIN — BENZTROPINE MESYLATE 2 MG: 2 TABLET ORAL at 08:23

## 2025-01-14 RX ADMIN — OXYCODONE 5 MG: 5 TABLET ORAL at 19:35

## 2025-01-14 RX ADMIN — OXYCODONE HYDROCHLORIDE AND ACETAMINOPHEN 1 TABLET: 5; 325 TABLET ORAL at 00:23

## 2025-01-14 RX ADMIN — ROSUVASTATIN CALCIUM 20 MG: 20 TABLET, FILM COATED ORAL at 20:41

## 2025-01-14 RX ADMIN — TAMSULOSIN HYDROCHLORIDE 0.4 MG: 0.4 CAPSULE ORAL at 08:23

## 2025-01-14 RX ADMIN — OXYCODONE 5 MG: 5 TABLET ORAL at 04:27

## 2025-01-14 RX ADMIN — ENOXAPARIN SODIUM 40 MG: 100 INJECTION SUBCUTANEOUS at 08:23

## 2025-01-14 RX ADMIN — Medication 5 MG: at 20:41

## 2025-01-14 RX ADMIN — ACETAMINOPHEN 975 MG: 325 TABLET ORAL at 20:42

## 2025-01-14 RX ADMIN — FLUOXETINE HYDROCHLORIDE 20 MG: 20 CAPSULE ORAL at 08:23

## 2025-01-14 RX ADMIN — BENZTROPINE MESYLATE 2 MG: 2 TABLET ORAL at 20:41

## 2025-01-14 RX ADMIN — ACETAMINOPHEN 975 MG: 325 TABLET ORAL at 14:28

## 2025-01-14 RX ADMIN — OXYCODONE 5 MG: 5 TABLET ORAL at 12:07

## 2025-01-14 RX ADMIN — ACETAMINOPHEN 975 MG: 325 TABLET ORAL at 05:25

## 2025-01-14 RX ADMIN — Medication 1 TABLET: at 08:23

## 2025-01-14 RX ADMIN — OLANZAPINE 30 MG: 5 TABLET, FILM COATED ORAL at 20:42

## 2025-01-14 SDOH — HEALTH STABILITY: MENTAL HEALTH: BEHAVIORAL HEALTH(WDL): EXCEPTIONS TO WDL

## 2025-01-14 SDOH — SOCIAL STABILITY: SOCIAL INSECURITY: WITHIN THE LAST YEAR, HAVE YOU BEEN AFRAID OF YOUR PARTNER OR EX-PARTNER?: NO

## 2025-01-14 SDOH — HEALTH STABILITY: MENTAL HEALTH: HOW OFTEN DO YOU HAVE SIX OR MORE DRINKS ON ONE OCCASION?: NEVER

## 2025-01-14 SDOH — SOCIAL STABILITY: SOCIAL INSECURITY: WITHIN THE LAST YEAR, HAVE YOU BEEN HUMILIATED OR EMOTIONALLY ABUSED IN OTHER WAYS BY YOUR PARTNER OR EX-PARTNER?: NO

## 2025-01-14 SDOH — SOCIAL STABILITY: SOCIAL INSECURITY: DOES ANYONE TRY TO KEEP YOU FROM HAVING/CONTACTING OTHER FRIENDS OR DOING THINGS OUTSIDE YOUR HOME?: NO

## 2025-01-14 SDOH — ECONOMIC STABILITY: FOOD INSECURITY: HOW HARD IS IT FOR YOU TO PAY FOR THE VERY BASICS LIKE FOOD, HOUSING, MEDICAL CARE, AND HEATING?: NOT VERY HARD

## 2025-01-14 SDOH — HEALTH STABILITY: MENTAL HEALTH: HOW OFTEN DO YOU HAVE A DRINK CONTAINING ALCOHOL?: NEVER

## 2025-01-14 SDOH — ECONOMIC STABILITY: FOOD INSECURITY: WITHIN THE PAST 12 MONTHS, THE FOOD YOU BOUGHT JUST DIDN'T LAST AND YOU DIDN'T HAVE MONEY TO GET MORE.: NEVER TRUE

## 2025-01-14 SDOH — SOCIAL STABILITY: SOCIAL INSECURITY: DO YOU FEEL ANYONE HAS EXPLOITED OR TAKEN ADVANTAGE OF YOU FINANCIALLY OR OF YOUR PERSONAL PROPERTY?: NO

## 2025-01-14 SDOH — ECONOMIC STABILITY: INCOME INSECURITY: IN THE PAST 12 MONTHS HAS THE ELECTRIC, GAS, OIL, OR WATER COMPANY THREATENED TO SHUT OFF SERVICES IN YOUR HOME?: NO

## 2025-01-14 SDOH — ECONOMIC STABILITY: HOUSING INSECURITY: IN THE LAST 12 MONTHS, WAS THERE A TIME WHEN YOU WERE NOT ABLE TO PAY THE MORTGAGE OR RENT ON TIME?: NO

## 2025-01-14 SDOH — SOCIAL STABILITY: SOCIAL INSECURITY: HAVE YOU HAD THOUGHTS OF HARMING ANYONE ELSE?: NO

## 2025-01-14 SDOH — ECONOMIC STABILITY: FOOD INSECURITY: WITHIN THE PAST 12 MONTHS, YOU WORRIED THAT YOUR FOOD WOULD RUN OUT BEFORE YOU GOT THE MONEY TO BUY MORE.: NEVER TRUE

## 2025-01-14 SDOH — ECONOMIC STABILITY: HOUSING INSECURITY: AT ANY TIME IN THE PAST 12 MONTHS, WERE YOU HOMELESS OR LIVING IN A SHELTER (INCLUDING NOW)?: NO

## 2025-01-14 SDOH — SOCIAL STABILITY: SOCIAL INSECURITY: HAVE YOU HAD ANY THOUGHTS OF HARMING ANYONE ELSE?: NO

## 2025-01-14 SDOH — HEALTH STABILITY: MENTAL HEALTH: HOW MANY DRINKS CONTAINING ALCOHOL DO YOU HAVE ON A TYPICAL DAY WHEN YOU ARE DRINKING?: PATIENT DOES NOT DRINK

## 2025-01-14 SDOH — SOCIAL STABILITY: SOCIAL INSECURITY: ARE THERE ANY APPARENT SIGNS OF INJURIES/BEHAVIORS THAT COULD BE RELATED TO ABUSE/NEGLECT?: NO

## 2025-01-14 SDOH — SOCIAL STABILITY: SOCIAL INSECURITY: WERE YOU ABLE TO COMPLETE ALL THE BEHAVIORAL HEALTH SCREENINGS?: YES

## 2025-01-14 SDOH — SOCIAL STABILITY: SOCIAL INSECURITY: ARE YOU OR HAVE YOU BEEN THREATENED OR ABUSED PHYSICALLY, EMOTIONALLY, OR SEXUALLY BY ANYONE?: NO

## 2025-01-14 SDOH — ECONOMIC STABILITY: TRANSPORTATION INSECURITY: IN THE PAST 12 MONTHS, HAS LACK OF TRANSPORTATION KEPT YOU FROM MEDICAL APPOINTMENTS OR FROM GETTING MEDICATIONS?: NO

## 2025-01-14 SDOH — SOCIAL STABILITY: SOCIAL INSECURITY: DO YOU FEEL UNSAFE GOING BACK TO THE PLACE WHERE YOU ARE LIVING?: NO

## 2025-01-14 SDOH — ECONOMIC STABILITY: HOUSING INSECURITY: IN THE PAST 12 MONTHS, HOW MANY TIMES HAVE YOU MOVED WHERE YOU WERE LIVING?: 0

## 2025-01-14 SDOH — SOCIAL STABILITY: SOCIAL INSECURITY: HAS ANYONE EVER THREATENED TO HURT YOUR FAMILY OR YOUR PETS?: NO

## 2025-01-14 SDOH — SOCIAL STABILITY: SOCIAL INSECURITY: ABUSE: ADULT

## 2025-01-14 ASSESSMENT — ACTIVITIES OF DAILY LIVING (ADL)
HEARING - LEFT EAR: FUNCTIONAL
JUDGMENT_ADEQUATE_SAFELY_COMPLETE_DAILY_ACTIVITIES: YES
HEARING - RIGHT EAR: FUNCTIONAL
PATIENT'S MEMORY ADEQUATE TO SAFELY COMPLETE DAILY ACTIVITIES?: YES
GROOMING: INDEPENDENT
BATHING: INDEPENDENT
FEEDING YOURSELF: INDEPENDENT
DRESSING YOURSELF: INDEPENDENT
TOILETING: INDEPENDENT
WALKS IN HOME: INDEPENDENT
ADEQUATE_TO_COMPLETE_ADL: YES
LACK_OF_TRANSPORTATION: NO

## 2025-01-14 ASSESSMENT — PAIN - FUNCTIONAL ASSESSMENT
PAIN_FUNCTIONAL_ASSESSMENT: 0-10
PAIN_FUNCTIONAL_ASSESSMENT: WONG-BAKER FACES

## 2025-01-14 ASSESSMENT — PAIN DESCRIPTION - LOCATION: LOCATION: OTHER (COMMENT)

## 2025-01-14 ASSESSMENT — COGNITIVE AND FUNCTIONAL STATUS - GENERAL
MOBILITY SCORE: 24
DAILY ACTIVITIY SCORE: 24
PATIENT BASELINE BEDBOUND: NO

## 2025-01-14 ASSESSMENT — ENCOUNTER SYMPTOMS
FEVER: 0
VOMITING: 0
HALLUCINATIONS: 1
DIZZINESS: 0
CHILLS: 0
ABDOMINAL PAIN: 0
SHORTNESS OF BREATH: 0
NAUSEA: 0
DYSURIA: 1
DIFFICULTY URINATING: 1

## 2025-01-14 ASSESSMENT — PAIN SCALES - GENERAL
PAINLEVEL_OUTOF10: 9
PAINLEVEL_OUTOF10: 10 - WORST POSSIBLE PAIN
PAINLEVEL_OUTOF10: 5 - MODERATE PAIN
PAINLEVEL_OUTOF10: 10 - WORST POSSIBLE PAIN
PAINLEVEL_OUTOF10: 10 - WORST POSSIBLE PAIN
PAINLEVEL_OUTOF10: 0 - NO PAIN
PAINLEVEL_OUTOF10: 8
PAINLEVEL_OUTOF10: 0 - NO PAIN

## 2025-01-14 ASSESSMENT — PAIN SCALES - WONG BAKER: WONGBAKER_NUMERICALRESPONSE: NO HURT

## 2025-01-14 ASSESSMENT — LIFESTYLE VARIABLES
SKIP TO QUESTIONS 9-10: 1
HOW OFTEN DO YOU HAVE A DRINK CONTAINING ALCOHOL: NEVER
HOW OFTEN DO YOU HAVE 6 OR MORE DRINKS ON ONE OCCASION: NEVER
SUBSTANCE_ABUSE_PAST_12_MONTHS: YES
AUDIT-C TOTAL SCORE: 0
AUDIT-C TOTAL SCORE: 0
SKIP TO QUESTIONS 9-10: 1
AUDIT-C TOTAL SCORE: 0
HOW MANY STANDARD DRINKS CONTAINING ALCOHOL DO YOU HAVE ON A TYPICAL DAY: PATIENT DOES NOT DRINK
PRESCIPTION_ABUSE_PAST_12_MONTHS: NO

## 2025-01-14 ASSESSMENT — PATIENT HEALTH QUESTIONNAIRE - PHQ9
2. FEELING DOWN, DEPRESSED OR HOPELESS: SEVERAL DAYS
SUM OF ALL RESPONSES TO PHQ9 QUESTIONS 1 & 2: 1
1. LITTLE INTEREST OR PLEASURE IN DOING THINGS: NOT AT ALL

## 2025-01-14 ASSESSMENT — PAIN DESCRIPTION - DESCRIPTORS: DESCRIPTORS: ACHING
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
## 2025-01-14 NOTE — DISCHARGE INSTRUCTIONS
Mr. Cunningham,     When you leave the hospital, I would like for you to follow up on the following things. I have listed them below for you to check off when completed.     Tomorrow- Please go to the [facility] on [address] to  your medicines. I have sent for a few days of your pain medicine (for the facial fractures) and then 30 days of the following medications: Tamsulosin, zyprexa, rosuvastatin, prozac, and cogentin. As you will only have 30 days  of these medicines, you will need to get in with a provider that can continue to write for them after the 30 days are up.   Tomorrow- Please make an appointment with the [facility]. They can make you an appointment for primary care and the dentist. The [facility] on [facility] has the following phone number: [phone].   Please make an appointment with the Urologists. These are the doctors that will help with your peeing. The phone number to schedule is [phone].     It was a pleasure taking care of you at [facility]!    Best Wishes,   Dr. Jessica Obrien
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
## 2025-01-15 VITALS
HEART RATE: 82 BPM | SYSTOLIC BLOOD PRESSURE: 133 MMHG | HEIGHT: 71 IN | DIASTOLIC BLOOD PRESSURE: 82 MMHG | WEIGHT: 194 LBS | OXYGEN SATURATION: 99 % | RESPIRATION RATE: 18 BRPM | TEMPERATURE: 97.7 F | BODY MASS INDEX: 27.16 KG/M2

## 2025-01-15 LAB
ALBUMIN SERPL BCP-MCNC: 3.7 G/DL (ref 3.4–5)
ANION GAP SERPL CALC-SCNC: 12 MMOL/L (ref 10–20)
BUN SERPL-MCNC: 9 MG/DL (ref 6–23)
CALCIUM SERPL-MCNC: 9.8 MG/DL (ref 8.6–10.6)
CHLORIDE SERPL-SCNC: 103 MMOL/L (ref 98–107)
CK SERPL-CCNC: 182 U/L (ref 0–325)
CO2 SERPL-SCNC: 31 MMOL/L (ref 21–32)
CREAT SERPL-MCNC: 1.08 MG/DL (ref 0.5–1.3)
EGFRCR SERPLBLD CKD-EPI 2021: 87 ML/MIN/1.73M*2
ERYTHROCYTE [DISTWIDTH] IN BLOOD BY AUTOMATED COUNT: 13.2 % (ref 11.5–14.5)
GLUCOSE SERPL-MCNC: 88 MG/DL (ref 74–99)
HCT VFR BLD AUTO: 40.3 % (ref 41–52)
HGB BLD-MCNC: 13.5 G/DL (ref 13.5–17.5)
MAGNESIUM SERPL-MCNC: 2.37 MG/DL (ref 1.6–2.4)
MCH RBC QN AUTO: 31.5 PG (ref 26–34)
MCHC RBC AUTO-ENTMCNC: 33.5 G/DL (ref 32–36)
MCV RBC AUTO: 94 FL (ref 80–100)
NRBC BLD-RTO: 0 /100 WBCS (ref 0–0)
PHOSPHATE SERPL-MCNC: 4.2 MG/DL (ref 2.5–4.9)
PLATELET # BLD AUTO: 419 X10*3/UL (ref 150–450)
POTASSIUM SERPL-SCNC: 4.5 MMOL/L (ref 3.5–5.3)
RBC # BLD AUTO: 4.29 X10*6/UL (ref 4.5–5.9)
SODIUM SERPL-SCNC: 141 MMOL/L (ref 136–145)
WBC # BLD AUTO: 6.6 X10*3/UL (ref 4.4–11.3)

## 2025-01-15 PROCEDURE — 99238 HOSP IP/OBS DSCHRG MGMT 30/<: CPT | Performed by: STUDENT IN AN ORGANIZED HEALTH CARE EDUCATION/TRAINING PROGRAM

## 2025-01-15 PROCEDURE — 36415 COLL VENOUS BLD VENIPUNCTURE: CPT | Performed by: NURSE PRACTITIONER

## 2025-01-15 PROCEDURE — 2500000004 HC RX 250 GENERAL PHARMACY W/ HCPCS (ALT 636 FOR OP/ED): Mod: SE | Performed by: NURSE PRACTITIONER

## 2025-01-15 PROCEDURE — 2500000002 HC RX 250 W HCPCS SELF ADMINISTERED DRUGS (ALT 637 FOR MEDICARE OP, ALT 636 FOR OP/ED): Mod: SE | Performed by: NURSE PRACTITIONER

## 2025-01-15 PROCEDURE — 83735 ASSAY OF MAGNESIUM: CPT | Performed by: NURSE PRACTITIONER

## 2025-01-15 PROCEDURE — 2500000001 HC RX 250 WO HCPCS SELF ADMINISTERED DRUGS (ALT 637 FOR MEDICARE OP): Mod: SE | Performed by: NURSE PRACTITIONER

## 2025-01-15 PROCEDURE — 90832 PSYTX W PT 30 MINUTES: CPT | Performed by: PSYCHOLOGIST

## 2025-01-15 PROCEDURE — 82550 ASSAY OF CK (CPK): CPT | Performed by: NURSE PRACTITIONER

## 2025-01-15 PROCEDURE — 80069 RENAL FUNCTION PANEL: CPT | Performed by: NURSE PRACTITIONER

## 2025-01-15 PROCEDURE — 85027 COMPLETE CBC AUTOMATED: CPT | Performed by: NURSE PRACTITIONER

## 2025-01-15 RX ADMIN — ACETAMINOPHEN 975 MG: 325 TABLET ORAL at 11:47

## 2025-01-15 RX ADMIN — FLUOXETINE HYDROCHLORIDE 20 MG: 20 CAPSULE ORAL at 11:46

## 2025-01-15 RX ADMIN — BENZTROPINE MESYLATE 2 MG: 2 TABLET ORAL at 11:48

## 2025-01-15 RX ADMIN — Medication 1 TABLET: at 11:46

## 2025-01-15 RX ADMIN — TAMSULOSIN HYDROCHLORIDE 0.4 MG: 0.4 CAPSULE ORAL at 11:49

## 2025-01-15 RX ADMIN — ENOXAPARIN SODIUM 40 MG: 100 INJECTION SUBCUTANEOUS at 11:47

## 2025-01-15 ASSESSMENT — ACTIVITIES OF DAILY LIVING (ADL): LACK_OF_TRANSPORTATION: NO

## 2025-01-15 NOTE — NURSING NOTE
"Rounded on pt to perform reassessment on suicide risk. Pt scored \"high risk\" in emergency room prior to arrival to current unit. In ER, Pt reported [place]\" and \"appeared internally stimulated.\" Utox + for THC and PCP on 1/13. PMH significant for schizoaffective d/o, depression, anxiety, and unknown number of self reported SA.    Spoke to RN who reported has been sleeping since receiving scheduled medications which included 30mg of Zyprexa. (Pt received dose from 1/6-1/9 during previous hospital stay) RN reports pt was cooperative and did not appear to be internally stimulated.     RN located near pt's room with pt in line of sight. Suicide precautions ordered on pt. DAIN pt at this time but RN to notify MERE once awake. MERE to evaluate suicide risk in the morning if pt is unavailable through out current night shift. No s/s of suicide or suicidal behavior observed or reported thus far. Pt currently resting in bed and did not appear to be in distress or discomfort.     MERE services available throughout hospitalization. Please secure chat \"MERE\" for question or concerns.    "

## 2025-01-15 NOTE — PROGRESS NOTES
"   01/15/25 1254   Discharge Planning   Living Arrangements Alone   Support Systems Family members;Parent   Assistance Needed Pt will need outpatient follow up appts and prescriptions.   Type of Residence Private residence  (apartment building)   Do you have animals or pets at home? No   Who is requesting discharge planning? Patient   Home or Post Acute Services Community services   Expected Discharge Disposition Home   Does the patient need discharge transport arranged? No  (Pt is requesting bus tickets.)   Financial Resource Strain   How hard is it for you to pay for the very basics like food, housing, medical care, and heating? Not very   Housing Stability   In the last 12 months, was there a time when you were not able to pay the mortgage or rent on time? N   At any time in the past 12 months, were you homeless or living in a shelter (including now)? N   Transportation Needs   In the past 12 months, has lack of transportation kept you from medical appointments or from getting medications? no   In the past 12 months, has lack of transportation kept you from meetings, work, or from getting things needed for daily living? No   Intensity of Service   Intensity of Service 0-30 min     Assessment Note:  Met with pt (with sitter at the bedside), and introduced myself as care coordinator and member of the Care Transitions team for discharge planning.   Pt lived at home alone and was independent prior to admission.  Pt uses [facility] for drs appts but reminded about his [facility] transportation benefit.  Pt's address, phone number and contact information was verified.  Await psychiatry recommendations.  Pt states, \"I am ready to go\".  Pt is requesting bus tickets; pt plans to go to his girlfriend's house.  Pt does not have any other questions/concerns at this time.     Previous Home Care: None  DME: None  Pharmacy: [facility] at [address].  Falls: Pt states he \"tripped\" 2 days prior to admission.  PCP:  [facility] [address]. Pt cannot recall the name of his provider or verify the last date of visit.  [facility]  [facility]  Pt's CM is \"Miss Coleman\".      Maria Dolores HALL, RN-BC  Transitional Care Coordinator (TCC)  [phone]     "

## 2025-01-15 NOTE — CARE PLAN
The patient's goals for the shift include Patient will  see psych this shift    The clinical goals for the shift include Patient will remain calm and cooperative this shift

## 2025-01-15 NOTE — PROGRESS NOTES
"Shamar Cunningham is a 44 y.o. male on day 1 of admission presenting with Suicidal ideation.     Pt discharged less than a week ago from  after being found unresponsive by EMS. At the time, pt positive for THC and PCP, and endorsed SI. Psychiatry saw pt and determined he did not meet criteria for inpatient psych.     On this admission, pt presenting with SI and positive for PCP and THC. On evaluation today, pt euthymic, denying any and all mood concerns. States he would like more pain medications and he would like to go home.     Objective     Physical Exam    Last Recorded Vitals  Blood pressure 133/82, pulse 82, temperature 36.5 °C (97.7 °F), temperature source Temporal, resp. rate 18, height 1.803 m (5' 10.98\"), weight 88 kg (194 lb 0.1 oz), SpO2 99%.  Intake/Output last 3 Shifts:  I/O last 3 completed shifts:  In: 720 (8.2 mL/kg) [P.O.:720]  Out: 3160 (35.9 mL/kg) [Urine:3160 (1 mL/kg/hr)]  Weight: 88 kg     MENTAL STATUS EXAM:  Appearance: laying in bed, head covered in blanket, adequate grooming and hygiene for setting, no acute distress  Attitude: cooperative  Behavior: good eye contact  Motor Activity: no tics, tremors, PMA/PMR noted  Speech: normal rate, rhythm, tone   Mood: euthymic  Thought Process: coherent, logical  Thought Content:  Denies SI/HI, focused on obtaining pain medications  Thought Perception: denies AVH, does not appear internally stimulated.  Cognition: in tact, AOx3  Insight: limited  Judgement: limited    Assessment/Plan   Pt is a 43 yo male with PMH of schizoaffective disorder and multiple substance use disorders (PCP, THC) presenting after recent discharge from [facility] with SI and pain. Psychiatry consulted for risk assessment.     On evaluation today, pt somewhat focused on obtaining pain medications, but largely in good spirits, denying SI. Acute risk for suicide low at this time, while chronic risk remains elevated.     IMPRESSION  -schizoaffective disorder, by chart review  -cluster " "B traits  -anxiety, unspecified, by chart review  -depression, unspecified by chart review     RECOMMENDATIONS  Safety:  - Patient does not currently meet criteria for inpatient psychiatric admission.   - To evaluate decision-making capacity, recommend use of the Capacity Evaluation Tool. Search “Capacity\" under SmartText\" unless the patient has a legal guardian, in which case all decisions per the legal guardian.  - Patient does not require a 1:1 sitter from a psychiatric perspective at this time.  - Defer to primary team decision for 1:1 sitter.    - As with all hospitalized patients, would recommend delirium precautions, as below:   -- Minimize use of benzos, opiates, anticholinergics, as these may worsen mental status   -- Would use caution with narcotic pain medications   -- Would still adequately controlling pain, as uncontrolled pain is also a risk factor for delirium   -- Reinforce sleep hygiene; encourage patient to stay awake during the day   -- Keep curtains/blinds open during the day and closed at night.   -- Would recommend reorienting/redirecting patient as much as possible,    -- Aim for consistent staffing, familiar objects, avoiding bright lights and loud noises, etc.     Follow-up:  - Recommend patient to follow up with psychiatry outpatient.        - Discussed recommendations with primary team.  - Psychiatry will sign off.     Thank you for allowing us to participate in the care of this patient. Please page [phone] with any questions or concerns.      Richelle Sotomayor, PhD      "
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
## 2025-01-18 LAB
ATRIAL RATE: 56 BPM
P AXIS: 53 DEGREES
P OFFSET: 205 MS
P ONSET: 157 MS
PR INTERVAL: 126 MS
Q ONSET: 220 MS
QRS COUNT: 9 BEATS
QRS DURATION: 72 MS
QT INTERVAL: 442 MS
QTC CALCULATION(BAZETT): 426 MS
QTC FREDERICIA: 432 MS
R AXIS: 21 DEGREES
T AXIS: -8 DEGREES
T OFFSET: 441 MS
VENTRICULAR RATE: 56 BPM

## 2025-02-08 ENCOUNTER — HOSPITAL ENCOUNTER (EMERGENCY)
Facility: HOSPITAL | Age: 45
Discharge: HOME | End: 2025-02-08
Attending: EMERGENCY MEDICINE
Payer: COMMERCIAL

## 2025-02-08 ENCOUNTER — CLINICAL SUPPORT (OUTPATIENT)
Dept: EMERGENCY MEDICINE | Facility: HOSPITAL | Age: 45
End: 2025-02-08
Payer: COMMERCIAL

## 2025-02-08 VITALS
HEIGHT: 73 IN | WEIGHT: 200 LBS | HEART RATE: 81 BPM | TEMPERATURE: 98 F | DIASTOLIC BLOOD PRESSURE: 89 MMHG | BODY MASS INDEX: 26.51 KG/M2 | RESPIRATION RATE: 16 BRPM | OXYGEN SATURATION: 98 % | SYSTOLIC BLOOD PRESSURE: 145 MMHG

## 2025-02-08 DIAGNOSIS — R45.851 SUICIDAL IDEATION: Primary | ICD-10-CM

## 2025-02-08 DIAGNOSIS — F19.10 POLYSUBSTANCE ABUSE (MULTI): ICD-10-CM

## 2025-02-08 LAB
ALBUMIN SERPL BCP-MCNC: 4.2 G/DL (ref 3.4–5)
ALP SERPL-CCNC: 79 U/L (ref 33–120)
ALT SERPL W P-5'-P-CCNC: 55 U/L (ref 10–52)
AMPHETAMINES UR QL SCN: ABNORMAL
ANION GAP SERPL CALC-SCNC: 12 MMOL/L (ref 10–20)
APAP SERPL-MCNC: <10 UG/ML
AST SERPL W P-5'-P-CCNC: 24 U/L (ref 9–39)
ATRIAL RATE: 62 BPM
BARBITURATES UR QL SCN: ABNORMAL
BASOPHILS # BLD AUTO: 0.04 X10*3/UL (ref 0–0.1)
BASOPHILS NFR BLD AUTO: 0.7 %
BENZODIAZ UR QL SCN: ABNORMAL
BILIRUB SERPL-MCNC: 0.6 MG/DL (ref 0–1.2)
BUN SERPL-MCNC: 10 MG/DL (ref 6–23)
BZE UR QL SCN: ABNORMAL
CALCIUM SERPL-MCNC: 10 MG/DL (ref 8.6–10.6)
CANNABINOIDS UR QL SCN: ABNORMAL
CARDIAC TROPONIN I PNL SERPL HS: 4 NG/L (ref 0–53)
CHLORIDE SERPL-SCNC: 101 MMOL/L (ref 98–107)
CO2 SERPL-SCNC: 27 MMOL/L (ref 21–32)
CREAT SERPL-MCNC: 1.02 MG/DL (ref 0.5–1.3)
EGFRCR SERPLBLD CKD-EPI 2021: >90 ML/MIN/1.73M*2
EOSINOPHIL # BLD AUTO: 0.1 X10*3/UL (ref 0–0.7)
EOSINOPHIL NFR BLD AUTO: 1.7 %
ERYTHROCYTE [DISTWIDTH] IN BLOOD BY AUTOMATED COUNT: 13.2 % (ref 11.5–14.5)
ETHANOL SERPL-MCNC: <10 MG/DL
FENTANYL+NORFENTANYL UR QL SCN: ABNORMAL
GLUCOSE SERPL-MCNC: 83 MG/DL (ref 74–99)
HCT VFR BLD AUTO: 40.1 % (ref 41–52)
HGB BLD-MCNC: 14.3 G/DL (ref 13.5–17.5)
IMM GRANULOCYTES # BLD AUTO: 0.01 X10*3/UL (ref 0–0.7)
IMM GRANULOCYTES NFR BLD AUTO: 0.2 % (ref 0–0.9)
LYMPHOCYTES # BLD AUTO: 2.6 X10*3/UL (ref 1.2–4.8)
LYMPHOCYTES NFR BLD AUTO: 44.5 %
MCH RBC QN AUTO: 31.2 PG (ref 26–34)
MCHC RBC AUTO-ENTMCNC: 35.7 G/DL (ref 32–36)
MCV RBC AUTO: 87 FL (ref 80–100)
METHADONE UR QL SCN: ABNORMAL
MONOCYTES # BLD AUTO: 0.62 X10*3/UL (ref 0.1–1)
MONOCYTES NFR BLD AUTO: 10.6 %
NEUTROPHILS # BLD AUTO: 2.47 X10*3/UL (ref 1.2–7.7)
NEUTROPHILS NFR BLD AUTO: 42.3 %
NRBC BLD-RTO: 0 /100 WBCS (ref 0–0)
OPIATES UR QL SCN: ABNORMAL
OXYCODONE+OXYMORPHONE UR QL SCN: ABNORMAL
P AXIS: 57 DEGREES
P OFFSET: 207 MS
P ONSET: 154 MS
PCP UR QL SCN: ABNORMAL
PLATELET # BLD AUTO: 280 X10*3/UL (ref 150–450)
POTASSIUM SERPL-SCNC: 3.8 MMOL/L (ref 3.5–5.3)
PR INTERVAL: 126 MS
PROT SERPL-MCNC: 7.6 G/DL (ref 6.4–8.2)
Q ONSET: 217 MS
QRS COUNT: 10 BEATS
QRS DURATION: 86 MS
QT INTERVAL: 386 MS
QTC CALCULATION(BAZETT): 391 MS
QTC FREDERICIA: 390 MS
R AXIS: 58 DEGREES
RBC # BLD AUTO: 4.59 X10*6/UL (ref 4.5–5.9)
SALICYLATES SERPL-MCNC: <3 MG/DL
SODIUM SERPL-SCNC: 136 MMOL/L (ref 136–145)
T AXIS: 62 DEGREES
T OFFSET: 410 MS
T4 FREE SERPL-MCNC: 1.26 NG/DL (ref 0.78–1.48)
TSH SERPL-ACNC: 0.2 MIU/L (ref 0.44–3.98)
VENTRICULAR RATE: 62 BPM
WBC # BLD AUTO: 5.8 X10*3/UL (ref 4.4–11.3)

## 2025-02-08 PROCEDURE — 2500000001 HC RX 250 WO HCPCS SELF ADMINISTERED DRUGS (ALT 637 FOR MEDICARE OP): Mod: SE

## 2025-02-08 PROCEDURE — 84484 ASSAY OF TROPONIN QUANT: CPT | Performed by: PHYSICIAN ASSISTANT

## 2025-02-08 PROCEDURE — 2500000005 HC RX 250 GENERAL PHARMACY W/O HCPCS: Mod: SE | Performed by: PHYSICIAN ASSISTANT

## 2025-02-08 PROCEDURE — 80307 DRUG TEST PRSMV CHEM ANLYZR: CPT | Performed by: PHYSICIAN ASSISTANT

## 2025-02-08 PROCEDURE — 99284 EMERGENCY DEPT VISIT MOD MDM: CPT

## 2025-02-08 PROCEDURE — 80143 DRUG ASSAY ACETAMINOPHEN: CPT | Performed by: PHYSICIAN ASSISTANT

## 2025-02-08 PROCEDURE — 80320 DRUG SCREEN QUANTALCOHOLS: CPT | Performed by: PHYSICIAN ASSISTANT

## 2025-02-08 PROCEDURE — 93010 ELECTROCARDIOGRAM REPORT: CPT | Performed by: PHYSICIAN ASSISTANT

## 2025-02-08 PROCEDURE — 99285 EMERGENCY DEPT VISIT HI MDM: CPT | Performed by: EMERGENCY MEDICINE

## 2025-02-08 PROCEDURE — 2500000001 HC RX 250 WO HCPCS SELF ADMINISTERED DRUGS (ALT 637 FOR MEDICARE OP): Mod: SE | Performed by: PHYSICIAN ASSISTANT

## 2025-02-08 PROCEDURE — 80053 COMPREHEN METABOLIC PANEL: CPT | Performed by: PHYSICIAN ASSISTANT

## 2025-02-08 PROCEDURE — 85025 COMPLETE CBC W/AUTO DIFF WBC: CPT | Performed by: PHYSICIAN ASSISTANT

## 2025-02-08 PROCEDURE — 99285 EMERGENCY DEPT VISIT HI MDM: CPT | Performed by: PHYSICIAN ASSISTANT

## 2025-02-08 PROCEDURE — 36415 COLL VENOUS BLD VENIPUNCTURE: CPT | Performed by: PHYSICIAN ASSISTANT

## 2025-02-08 PROCEDURE — 84443 ASSAY THYROID STIM HORMONE: CPT | Performed by: PHYSICIAN ASSISTANT

## 2025-02-08 PROCEDURE — 84439 ASSAY OF FREE THYROXINE: CPT | Performed by: PHYSICIAN ASSISTANT

## 2025-02-08 PROCEDURE — 93005 ELECTROCARDIOGRAM TRACING: CPT

## 2025-02-08 RX ORDER — LIDOCAINE 560 MG/1
1 PATCH PERCUTANEOUS; TOPICAL; TRANSDERMAL ONCE
Status: DISCONTINUED | OUTPATIENT
Start: 2025-02-08 | End: 2025-02-08 | Stop reason: HOSPADM

## 2025-02-08 RX ORDER — IBUPROFEN 600 MG/1
TABLET ORAL
Status: COMPLETED
Start: 2025-02-08 | End: 2025-02-08

## 2025-02-08 RX ORDER — IBUPROFEN 600 MG/1
600 TABLET ORAL ONCE
Status: COMPLETED | OUTPATIENT
Start: 2025-02-08 | End: 2025-02-08

## 2025-02-08 RX ORDER — ACETAMINOPHEN 325 MG/1
975 TABLET ORAL ONCE
Status: COMPLETED | OUTPATIENT
Start: 2025-02-08 | End: 2025-02-08

## 2025-02-08 RX ADMIN — IBUPROFEN 600 MG: 600 TABLET, FILM COATED ORAL at 15:24

## 2025-02-08 RX ADMIN — LIDOCAINE 4% 1 PATCH: 40 PATCH TOPICAL at 13:22

## 2025-02-08 RX ADMIN — ACETAMINOPHEN 975 MG: 325 TABLET ORAL at 13:22

## 2025-02-08 RX ADMIN — IBUPROFEN 600 MG: 600 TABLET ORAL at 15:24

## 2025-02-08 SDOH — ECONOMIC STABILITY: GENERAL

## 2025-02-08 SDOH — HEALTH STABILITY: MENTAL HEALTH

## 2025-02-08 SDOH — ECONOMIC STABILITY: HOUSING INSECURITY: FEELS SAFE LIVING IN HOME: OTHER (COMMENT)

## 2025-02-08 SDOH — HEALTH STABILITY: MENTAL HEALTH: IN THE PAST WEEK, HAVE YOU BEEN HAVING THOUGHTS ABOUT KILLING YOURSELF?: YES

## 2025-02-08 SDOH — HEALTH STABILITY: MENTAL HEALTH: DESCRIBE YOUR THOUGHTS OF KILLING YOURSELF RIGHT NOW:: PER PROVIDER REPORT- PLAN TO WALK INTO TRAFFIC.

## 2025-02-08 SDOH — HEALTH STABILITY: MENTAL HEALTH: IN THE PAST FEW WEEKS, HAVE YOU FELT THAT YOU OR YOUR FAMILY WOULD BE BETTER OFF IF YOU WERE DEAD?: YES

## 2025-02-08 SDOH — HEALTH STABILITY: MENTAL HEALTH: SUICIDAL BEHAVIOR (3 MONTHS): NO

## 2025-02-08 SDOH — HEALTH STABILITY: MENTAL HEALTH: IN THE PAST FEW WEEKS, HAVE YOU WISHED YOU WERE DEAD?: YES

## 2025-02-08 SDOH — HEALTH STABILITY: MENTAL HEALTH: DEPRESSION SYMPTOMS: IMPAIRED CONCENTRATION

## 2025-02-08 SDOH — HEALTH STABILITY: MENTAL HEALTH: HAVE YOU EVER TRIED TO KILL YOURSELF?: YES

## 2025-02-08 SDOH — HEALTH STABILITY: MENTAL HEALTH: SUICIDAL BEHAVIOR (LIFETIME): YES

## 2025-02-08 SDOH — HEALTH STABILITY: MENTAL HEALTH: HOW DID YOU TRY TO KILL YOURSELF?: HANGING, PER CHART REVIEW

## 2025-02-08 SDOH — HEALTH STABILITY: MENTAL HEALTH: ANXIETY SYMPTOMS: NO PROBLEMS REPORTED OR OBSERVED.

## 2025-02-08 SDOH — HEALTH STABILITY: MENTAL HEALTH: ARE YOU HAVING THOUGHTS OF KILLING YOURSELF RIGHT NOW?: YES

## 2025-02-08 SDOH — HEALTH STABILITY: MENTAL HEALTH: ACTIVE SUICIDAL IDEATION WITH SPECIFIC PLAN AND INTENT (PAST 1 MONTH): YES

## 2025-02-08 SDOH — HEALTH STABILITY: MENTAL HEALTH: SUICIDAL BEHAVIOR (DESCRIPTION): PER CHART REVIEW, HANGING MONTHS PRIOR TO ED ARRIVAL.

## 2025-02-08 SDOH — HEALTH STABILITY: MENTAL HEALTH: ACTIVE SUICIDAL IDEATION WITH SOME INTENT TO ACT, WITHOUT SPECIFIC PLAN (PAST 1 MONTH): NO

## 2025-02-08 SDOH — HEALTH STABILITY: MENTAL HEALTH: NON-SPECIFIC ACTIVE SUICIDAL THOUGHTS (PAST 1 MONTH): YES

## 2025-02-08 SDOH — HEALTH STABILITY: MENTAL HEALTH: WISH TO BE DEAD (PAST 1 MONTH): YES

## 2025-02-08 ASSESSMENT — COLUMBIA-SUICIDE SEVERITY RATING SCALE - C-SSRS
4. HAVE YOU HAD THESE THOUGHTS AND HAD SOME INTENTION OF ACTING ON THEM?: YES
6. HAVE YOU EVER DONE ANYTHING, STARTED TO DO ANYTHING, OR PREPARED TO DO ANYTHING TO END YOUR LIFE?: YES
5. HAVE YOU STARTED TO WORK OUT OR WORKED OUT THE DETAILS OF HOW TO KILL YOURSELF? DO YOU INTEND TO CARRY OUT THIS PLAN?: YES
1. SINCE LAST CONTACT, HAVE YOU WISHED YOU WERE DEAD OR WISHED YOU COULD GO TO SLEEP AND NOT WAKE UP?: YES
2. HAVE YOU ACTUALLY HAD ANY THOUGHTS OF KILLING YOURSELF?: YES
1. IN THE PAST MONTH, HAVE YOU WISHED YOU WERE DEAD OR WISHED YOU COULD GO TO SLEEP AND NOT WAKE UP?: YES
6. HAVE YOU EVER DONE ANYTHING, STARTED TO DO ANYTHING, OR PREPARED TO DO ANYTHING TO END YOUR LIFE?: YES
2. HAVE YOU ACTUALLY HAD ANY THOUGHTS OF KILLING YOURSELF?: YES
6. HAVE YOU EVER DONE ANYTHING, STARTED TO DO ANYTHING, OR PREPARED TO DO ANYTHING TO END YOUR LIFE?: YES
5. HAVE YOU STARTED TO WORK OUT OR WORKED OUT THE DETAILS OF HOW TO KILL YOURSELF? DO YOU INTEND TO CARRY OUT THIS PLAN?: YES

## 2025-02-08 ASSESSMENT — LIFESTYLE VARIABLES
SUBSTANCE_ABUSE_PAST_12_MONTHS: YES
PRESCIPTION_ABUSE_PAST_12_MONTHS: NO
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
## 2025-02-08 NOTE — PROGRESS NOTES
"EPAT - Social Work Psychiatric Assessment    Arrival Details  Mode of Arrival: Ambulatory  Admission Source: Home  Admission Type: Voluntary  EPAT Assessment Start Date: 02/08/25  EPAT Assessment Start Time: 1345  Name of : Lauryn Childs [facility]    History of Present Illness  Admission Reason: Psychiatric Evaluation  HPI: Patient, Shamar Cunningham, is a 44 year old male with history of schizoaffective disorder, bipolar type; anxiety, borderline personality disorder, and polysubstance use disorder (marijuana, cocaine, and PCP). Patient presented to ED with complaint of psychiatric evaluation. Patient reportedly experiencing worseing hallucinations and suicidal ideation. Patient reporting plan to walk into traffic. Patient reported belief that people in patient's apartment are giving patient PCP instead of prescribed medications. Patient reported experiencing auditory hallucinations telling patient to kill self. Patient denied homicidal ideation. EPAT consulted due to risk of harm to self and concern for psychiatric decompensation. Patient's chart, community record, provider note, triage note, labs, and C-SSRS score reviewed. Patient's chart shows history of mental health diagnoses, inpatient psychaitric hospitalizations, and EPAT assessments. Patient's most recent inpatient psychiatric hospitalization noted from 1/26-1/30/2025 at [facility]. Patient's C-SSRS scored at \"high risk\" in triage. Patient's mother, Ann Cunningham ([phone]), contacted unsuccessfully.    SW Readmission Information   Readmission within 30 Days: Yes  Previous ED Visit Date and Reason : 01/26/2025, Psychiatric Evaluation  Previous Discharge Date and Location: 01/30/2025, from [facility].  Factors Contributing to  Readmission Inpatient/ED (Team Perspective): Lack of Community Support, Substance Abuse, Discharge Plan Did Not Meet Patient Needs  Readmission Factors Team Comments: Chronic mental health and substance use disorder.  Factors Contributing " to Readmission (Patient/Family Perspective): Chronic mental health and substance use disorder.    Psychiatric Symptoms  Anxiety Symptoms: No problems reported or observed.  Depression Symptoms: Impaired concentration  Rabia Symptoms: No problems reported or observed.    Psychosis Symptoms  Hallucination Type: Auditory  Delusion Type: Paranoid    Additional Symptoms - Adult  Generalized Anxiety Disorder: No problems reported or observed.  Obsessive Compulsive Disorder: No problems reported or observed.  Panic Attack: No problems reported or observed.  Post Traumatic Stress Disorder: No problems reported or observed.  Delirium: No problems reported or observed.  Review of Symptoms Comments: Patient reported worsening symptoms of depression incluiding suicidal ideation. Patient reported plan of walking infront of traffic to ED provider. Patient denied homicidal ideation. Patient reported hallucinations of voices telling patient to kill self.    Past Psychiatric History/Meds/Treatments  Past Psychiatric History: Patient has history of schizoaffective disorder, bipolar type; anxiety, borderline personality disorder, and polysubstance use disorder (marijuana, cocaine, PCP).  Past Psychiatric Meds/Treatments: Patient reported history of prozac, zyprexa, and trazadone use. Patient reported non-compliance with medications due to people stealing medications. Patient's charting indicates history of similar complaints about people stealing prescriptions as reason for not complying with medications. Patient historically has reported using PCP for symptom management. Patient has history of inpatient psychiatric hospitalizations with most recent noted at [facility] from 01/26-01/30/2025.  Past Violence/Victimization History: Patient has violence risk indicator in charting. Patient appeared in behavioral control during EPAT assessment.    Current Mental Health Contacts   Name/Phone Number: Previously through the [facility] and  [facility] team.   Last Appointment Date: Unreported  Provider Name/Phone Number: [facility] though [facility]  Provider Last Appointment Date: Unreported    Support System: Community    Living Arrangement: Apartment    Home Safety  Feels Safe Living in Home: Other (Comment) (Patient reported feeling as if people are stealing medications and trying to give patient PCP at home.)  Potentially Unsafe Housing Conditions: Unable to Assess  Home Safety : Patient reported feeling worried about living situation.    Income Information  Employment Status for: Patient  Employment Status: Disabled  Income Source: Disability  Current/Previous Occupation: Unable to Assess  Income/Expense Information: Income meets expenses  Financial Concerns: None  Who Manages Finances if Patient Unable: Unreported  Employment/ Finance Comments: Patient utilizes [employer] income for expenses.    [place] Service/Education History  Current or Previous  Service: None   Experience: Other (Comment) (Unreported)  Education Level: Other (Comment) (Did not assess)  History of Learning Problems: No  History of School Behavior Problems: No  School History: Patient did not discuss learning issues or school history.    Social/Cultural History  Social History: Patient is a 44 year old black male with brown skin, brown hair, wearing hospital gear. Patient appeared moderately groomed and close to stated age.  Cultural Requests During Hospitalization: Unreported  Spiritual Requests During Hospitalization: Unreported  Important Activities: Social, Family    Legal  Legal Considerations: Patient/ Family Ability to Make Healthcare Decisions  Assistance with Managing/Advocating Healthcare Needs: Other (Comment) (Unreported)  Criminal Activity/ Legal Involvement Pertinent to Current Situation/ Hospitalization: Unreported  Legal Concerns: Unreported  Legal Comments: Unreported    Drug Screening  Have you used any substances (canabis, cocaine, heroin,  hallucinogens, inhalants, etc.) in the past 12 months?: Yes  Have you used any prescription drugs other than prescribed in the past 12 months?: No  Is a toxicology screen needed?: Yes    Stage of Change  Stage of Change: Precontemplation  History of Treatment: Inpatient, Dual  Type of Treatment Offered: AA/NA meeting resource  Treatment Offered: Declined  Duration of Substance Use: Unreported  Frequency of Substance Use: Weekly  Age of First Substance Use: Unreported    Behavioral Health  Behavioral Health(WDL): Exceptions to WDL  Behaviors/Mood: Cooperative, Pleasant, Manipulative  Affect: Appropriate to circumstances  Parent/Guardian/Significant Other Involvement: No involvement  Family Behaviors: Unable to assess  Visitor Behaviors: Unable to assess  Needs Expressed: Emotional  Emotional Support Given: Reassure    Orientation  Orientation Level: Oriented X4    General Appearance  Motor Activity: Unremarkable  Speech Pattern: Excessively loud, Other (Comment) (Mumbled)  General Attitude: Cooperative, Pleasant  Appearance/Hygiene: Poor hygiene    Thought Process  Coherency: Circumstantial  Content: Unremarkable  Delusions: Paranoid  Perception: Hallucinations  Hallucination: Auditory  Judgment/Insight: Limited  Confusion: None  Cognition: Poor judgement, Poor safety awareness    Sleep Pattern  Sleep Pattern: Unable to assess    Risk Factors  Self Harm/Suicidal Ideation Plan: Patient reported ongoing suicidal ideation with plan to walk into traffic. Patient often presents to ED with complaint of conditional suicidal ideation.  Previous Self Harm/Suicidal Plans: Per chart review, history of hanging attempt.  Risk Factors: Male, Major mental illness, Lower socioeconomic status, Personality disorder (antisocial, borderline), Substance abuse  Description of Thoughts/Ideas Leaving Unit Now: Patient reporting conditional suicidal ideation relating to discharging from ED back to community housing.    Violence Risk  Assessment  Assessment of Violence: None noted  Thoughts of Harm to Others: No    Ability to Assess Risk Screen  Risk Screen - Ability to Assess: Able to be screened  Ask Suicide-Screening Questions  1. In the past few weeks, have you wished you were dead?: Yes  2. In the past few weeks, have you felt that you or your family would be better off if you were dead?: Yes  3. In the past week, have you been having thoughts about killing yourself?: Yes  4. Have you ever tried to kill yourself?: Yes  How did you try to kill yourself?: Hanging, per chart review  When did you try to kill yourself?: Months prior to [facility] ED visit.  5. Are you having thoughts of killing yourself right now?: Yes  Describe your thoughts of killing yourself right now:: Per provider report- plan to walk into traffic.  Calculated Risk Score: Imminent Risk  [place] Suicide Severity Rating Scale (Screener/Recent Self-Report)  1. Wish to be Dead (Past 1 Month): Yes  2. Non-Specific Active Suicidal Thoughts (Past 1 Month): Yes  3. Active Suicidal Ideation with any Methods (Not Plan) Without Intent to Act (Past 1 Month): Yes  4. Active Suicidal Ideation with Some Intent to Act, Without Specific Plan (Past 1 Month): No  5. Active Suicidal Ideation with Specific Plan and Intent (Past 1 Month): Yes  6. Suicidal Behavior (Lifetime): Yes  6. Suicidal Behavior (3 Months): No  6. Suicidal Behavior (Description): Per chart review, hanging months prior to ED arrival.  Calculated C-SSRS Risk Score (Lifetime/Recent): High Risk  Step 1: Risk Factors  Current & Past Psychiatric Dx: Mood disorder, Psychotic disorder, Alcohol/substance abuse disorders, Cluster B personality disorders or traits (i.e., borderline, antisocial, histrionic & narcissistic)  Presenting Symptoms: Psychosis  Family History: Other (Comment) (Unreported)  Precipitants/Stressors: Chronic physical pain or other acute medical problem (e.g. CNS disorders), Substance intoxication or withdrawal,  "Inadequate social supports  Change in Treatment: Recent inpatient discharge  Access to Lethal Methods : No  Step 2: Protective Factors   Protective Factors Internal: Identifies reasons for living  Protective Factors External: Responsibility to children, Supportive social network or family or friends  Step 3: Suicidal Ideation Intensity  Most Severe Suicidal Ideation Identified: Patient reported ongoing conditional suicidal ideation with plan to walk into traffic.  How Many Times Have You Had These Thoughts: 2-5 times in a week  When You Have the Thoughts How Long do They Last : 1-4 hours/a lot of the time  Could/Can You Stop Thinking About Killing Yourself or Wanting to Die if You Want to: Can control thoughts with some difficulty  Are There Things - Anyone or Anything - That Stopped You From Wanting to Die or Acting on: Uncertain that deterrents stopped you  What Sort of Reasons Did You Have For Thinking About Wanting to Die or Killing Yourself: Equally to get attention, revenge or a reaction from others and to end/stop the pain  Total Score: 15  Step 5: Documentation  Risk Level: Moderate suicide risk    Psychiatric Impression and Plan of Care    Assessment and Plan: Patient, Shamar Cunningham, is a 44 year old male with history of schizoaffective disorder bipolar type, anxiety, borderline personality disorder, and polysubstance use disorder (marijuana, cocaine, and PCP). Patient presented to ED with complaint of psychiatric evaluation. Patient discussed reason for ED visit stating \"My back hurts. My teeth hurt. I am hearing voices telling me to hurt myself\". Patient discussed ongoing conditional suicidal ideation relating to living situation. Patient is familiar to the EPAT team and this . Patient's chart history indicates several past ED visits for same complaint. Patient often will complain about people in the community stealing medications from patient. Patient has reported, in the past, using PCP for " symptom management in lieu of prescription medications. Patient denied worsening depression and anxiety symptoms. Patient denied acute changes to appetite and sleeping. Patient discussed some ongoing pain related to slipped disk and braces placed on teeth after recent fall. Patient appears to have low distress tolerance which may be impacting suicidal thinking. Renato's C-SSRS scored at moderate risk due to ongoing chronic suicidal ideation. Per chart review patient has history of suicide attempt via hanging. Unclear if attempt reported is entirely accurate. Patient's overall lifetime risk of harm to self remains elevated at high risk. Patient denied homicidal ideation. Patient discussed auditory hallucinations telling patient to kill self, which appear often and are likely baseline for patient. Patient reported recent use of PCP and marijuana claiming people in patient's home are tricking patient into using substances. Patient declined sober support resources from [facility], voicing desire for inpatient psychiatric hospitalization prior to [facility] support. Patient denied recent attendance and psychiatry or therapy services with [facility]. Patient recently released from psychiatric hospitalization. Patient able to identify supportive people including patient's mother and children. Patient able to identify reasons for living being patient's children and watching them grow up. Patient reported plans to get caps on teeth and visit patient's son in coming months. While patient presents moderate risk to self at this time and chronic high risk to self across lifespan, patient is at likely baseline functioning. Per most recent psychiatric hospitalization discharge summary, patient's symptoms of psychosis are suspected to be linked with chronic PCP use. Patient encouraged to follow up with [facility], call 9-1-1, call crisis hotline, and return to ED if symptoms worsen. Patient recommended for discharge. Plan for care  discussed with and approved by Dr. Mcintosh.    Specific Resources Provided to Patient: Patient encouraged to follow up with [facility], call 9-1-1, call crisis hotline, and return to ED if symptoms worsen.  CM Notified: -  PHP/IOP Recommended: Not at this time  Specific Information Provided for PHP/IOP: None at this time.  Plan Comments: Diagnosis: Unspecified mood disorder with history of schizoaffective disorder and polysubstance use.    Outcome/Disposition  Patient's Perception of Outcome Achieved: Patient requesting hospitalization at [facility] or [facility].  Assessment, Recommendations and Risk Level Reviewed with: Dr Mcintosh  Contact Name: Ann Cunningham  Contact Number(s): [phone]  Contact Relationship: Mother  EPAT Assessment Completed Date: 02/08/25  EPAT Assessment Completed Time: 1642  Patient Disposition: Home

## 2025-02-18 ENCOUNTER — HOSPITAL ENCOUNTER (EMERGENCY)
Facility: HOSPITAL | Age: 45
Discharge: HOME | End: 2025-02-19
Attending: EMERGENCY MEDICINE
Payer: COMMERCIAL

## 2025-02-18 VITALS
SYSTOLIC BLOOD PRESSURE: 122 MMHG | DIASTOLIC BLOOD PRESSURE: 76 MMHG | HEART RATE: 66 BPM | OXYGEN SATURATION: 99 % | RESPIRATION RATE: 17 BRPM

## 2025-02-18 DIAGNOSIS — F19.10 DRUG ABUSE (MULTI): Primary | ICD-10-CM

## 2025-02-18 DIAGNOSIS — Z86.79 HISTORY OF HYPERTENSION: ICD-10-CM

## 2025-02-18 DIAGNOSIS — F25.9 SCHIZOAFFECTIVE DISORDER, UNSPECIFIED TYPE: Chronic | ICD-10-CM

## 2025-02-18 LAB
ALBUMIN SERPL BCP-MCNC: 3.9 G/DL (ref 3.4–5)
ALP SERPL-CCNC: 72 U/L (ref 33–120)
ALT SERPL W P-5'-P-CCNC: 15 U/L (ref 10–52)
ANION GAP BLDV CALCULATED.4IONS-SCNC: 13 MMOL/L (ref 10–25)
ANION GAP SERPL CALC-SCNC: 15 MMOL/L (ref 10–20)
APAP SERPL-MCNC: <10 UG/ML
AST SERPL W P-5'-P-CCNC: 18 U/L (ref 9–39)
BASE EXCESS BLDV CALC-SCNC: -0.5 MMOL/L (ref -2–3)
BASOPHILS # BLD AUTO: 0.03 X10*3/UL (ref 0–0.1)
BASOPHILS NFR BLD AUTO: 0.6 %
BILIRUB SERPL-MCNC: 0.5 MG/DL (ref 0–1.2)
BODY TEMPERATURE: 37 DEGREES CELSIUS
BUN SERPL-MCNC: 14 MG/DL (ref 6–23)
CA-I BLDV-SCNC: 1.26 MMOL/L (ref 1.1–1.33)
CALCIUM SERPL-MCNC: 9.3 MG/DL (ref 8.6–10.6)
CHLORIDE BLDV-SCNC: 103 MMOL/L (ref 98–107)
CHLORIDE SERPL-SCNC: 104 MMOL/L (ref 98–107)
CK SERPL-CCNC: 104 U/L (ref 0–325)
CO2 SERPL-SCNC: 24 MMOL/L (ref 21–32)
CREAT SERPL-MCNC: 1.13 MG/DL (ref 0.5–1.3)
EGFRCR SERPLBLD CKD-EPI 2021: 82 ML/MIN/1.73M*2
EOSINOPHIL # BLD AUTO: 0.1 X10*3/UL (ref 0–0.7)
EOSINOPHIL NFR BLD AUTO: 2.1 %
ERYTHROCYTE [DISTWIDTH] IN BLOOD BY AUTOMATED COUNT: 12.5 % (ref 11.5–14.5)
ETHANOL SERPL-MCNC: <10 MG/DL
GLUCOSE BLDV-MCNC: 127 MG/DL (ref 74–99)
GLUCOSE SERPL-MCNC: 121 MG/DL (ref 74–99)
HCO3 BLDV-SCNC: 25.9 MMOL/L (ref 22–26)
HCT VFR BLD AUTO: 39.7 % (ref 41–52)
HCT VFR BLD EST: 43 % (ref 41–52)
HGB BLD-MCNC: 14.2 G/DL (ref 13.5–17.5)
HGB BLDV-MCNC: 14.3 G/DL (ref 13.5–17.5)
IMM GRANULOCYTES # BLD AUTO: 0.01 X10*3/UL (ref 0–0.7)
IMM GRANULOCYTES NFR BLD AUTO: 0.2 % (ref 0–0.9)
LACTATE BLDV-SCNC: 1.1 MMOL/L (ref 0.4–2)
LYMPHOCYTES # BLD AUTO: 3.06 X10*3/UL (ref 1.2–4.8)
LYMPHOCYTES NFR BLD AUTO: 64.6 %
MCH RBC QN AUTO: 31.6 PG (ref 26–34)
MCHC RBC AUTO-ENTMCNC: 35.8 G/DL (ref 32–36)
MCV RBC AUTO: 88 FL (ref 80–100)
MONOCYTES # BLD AUTO: 0.33 X10*3/UL (ref 0.1–1)
MONOCYTES NFR BLD AUTO: 7 %
NEUTROPHILS # BLD AUTO: 1.21 X10*3/UL (ref 1.2–7.7)
NEUTROPHILS NFR BLD AUTO: 25.5 %
NRBC BLD-RTO: 0 /100 WBCS (ref 0–0)
OXYHGB MFR BLDV: 81.2 % (ref 45–75)
PCO2 BLDV: 48 MM HG (ref 41–51)
PH BLDV: 7.34 PH (ref 7.33–7.43)
PLATELET # BLD AUTO: 263 X10*3/UL (ref 150–450)
PO2 BLDV: 54 MM HG (ref 35–45)
POTASSIUM BLDV-SCNC: 3.7 MMOL/L (ref 3.5–5.3)
POTASSIUM SERPL-SCNC: 3.6 MMOL/L (ref 3.5–5.3)
PROT SERPL-MCNC: 7.1 G/DL (ref 6.4–8.2)
RBC # BLD AUTO: 4.49 X10*6/UL (ref 4.5–5.9)
SALICYLATES SERPL-MCNC: <3 MG/DL
SAO2 % BLDV: 84 % (ref 45–75)
SODIUM BLDV-SCNC: 138 MMOL/L (ref 136–145)
SODIUM SERPL-SCNC: 139 MMOL/L (ref 136–145)
TSH SERPL-ACNC: 0.52 MIU/L (ref 0.44–3.98)
WBC # BLD AUTO: 4.7 X10*3/UL (ref 4.4–11.3)

## 2025-02-18 PROCEDURE — 82435 ASSAY OF BLOOD CHLORIDE: CPT

## 2025-02-18 PROCEDURE — 2500000002 HC RX 250 W HCPCS SELF ADMINISTERED DRUGS (ALT 637 FOR MEDICARE OP, ALT 636 FOR OP/ED): Mod: SE

## 2025-02-18 PROCEDURE — 80320 DRUG SCREEN QUANTALCOHOLS: CPT

## 2025-02-18 PROCEDURE — 82810 BLOOD GASES O2 SAT ONLY: CPT

## 2025-02-18 PROCEDURE — 82550 ASSAY OF CK (CPK): CPT

## 2025-02-18 PROCEDURE — 84295 ASSAY OF SERUM SODIUM: CPT | Mod: 59

## 2025-02-18 PROCEDURE — 84443 ASSAY THYROID STIM HORMONE: CPT

## 2025-02-18 PROCEDURE — 99285 EMERGENCY DEPT VISIT HI MDM: CPT | Performed by: NURSE PRACTITIONER

## 2025-02-18 PROCEDURE — 99285 EMERGENCY DEPT VISIT HI MDM: CPT | Performed by: EMERGENCY MEDICINE

## 2025-02-18 PROCEDURE — 36415 COLL VENOUS BLD VENIPUNCTURE: CPT

## 2025-02-18 PROCEDURE — 85025 COMPLETE CBC W/AUTO DIFF WBC: CPT

## 2025-02-18 RX ORDER — FLUOXETINE HYDROCHLORIDE 20 MG/1
20 CAPSULE ORAL DAILY
Qty: 30 CAPSULE | Refills: 0 | Status: SHIPPED | OUTPATIENT
Start: 2025-02-18 | End: 2025-03-20

## 2025-02-18 RX ORDER — ROSUVASTATIN CALCIUM 20 MG/1
20 TABLET, COATED ORAL NIGHTLY
Qty: 30 TABLET | Refills: 0 | Status: SHIPPED | OUTPATIENT
Start: 2025-02-18 | End: 2025-03-20

## 2025-02-18 RX ORDER — FLUOXETINE HYDROCHLORIDE 20 MG/1
20 CAPSULE ORAL DAILY
Qty: 30 CAPSULE | Refills: 0 | Status: SHIPPED | OUTPATIENT
Start: 2025-02-18 | End: 2025-02-18

## 2025-02-18 RX ORDER — BENZTROPINE MESYLATE 2 MG/1
2 TABLET ORAL 2 TIMES DAILY
Qty: 60 TABLET | Refills: 0 | Status: SHIPPED | OUTPATIENT
Start: 2025-02-18 | End: 2025-02-18

## 2025-02-18 RX ORDER — ROSUVASTATIN CALCIUM 20 MG/1
20 TABLET, COATED ORAL NIGHTLY
Qty: 30 TABLET | Refills: 0 | Status: SHIPPED | OUTPATIENT
Start: 2025-02-18 | End: 2025-02-18

## 2025-02-18 RX ORDER — BENZTROPINE MESYLATE 2 MG/1
2 TABLET ORAL 2 TIMES DAILY
Qty: 60 TABLET | Refills: 0 | Status: SHIPPED | OUTPATIENT
Start: 2025-02-18 | End: 2025-03-20

## 2025-02-18 RX ORDER — OLANZAPINE 15 MG/1
30 TABLET ORAL NIGHTLY
Qty: 60 TABLET | Refills: 0 | Status: SHIPPED | OUTPATIENT
Start: 2025-02-18 | End: 2025-02-18

## 2025-02-18 RX ORDER — OLANZAPINE 15 MG/1
30 TABLET ORAL NIGHTLY
Qty: 60 TABLET | Refills: 0 | Status: SHIPPED | OUTPATIENT
Start: 2025-02-18 | End: 2025-03-20

## 2025-02-18 RX ADMIN — OLANZAPINE 15 MG: 10 TABLET, ORALLY DISINTEGRATING ORAL at 23:02

## 2025-02-18 SDOH — SOCIAL STABILITY: SOCIAL NETWORK: PARENT/GUARDIAN/SIGNIFICANT OTHER INVOLVEMENT: ATTENTIVE TO PATIENT NEEDS

## 2025-02-18 SDOH — HEALTH STABILITY: MENTAL HEALTH: BEHAVIORAL HEALTH(WDL): EXCEPTIONS TO WDL

## 2025-02-18 SDOH — HEALTH STABILITY: MENTAL HEALTH: CONTENT: UNREMARKABLE

## 2025-02-18 SDOH — HEALTH STABILITY: MENTAL HEALTH: SLEEP PATTERN: DIFFICULTY FALLING ASLEEP

## 2025-02-18 SDOH — HEALTH STABILITY: MENTAL HEALTH

## 2025-02-18 SDOH — HEALTH STABILITY: MENTAL HEALTH: BEHAVIORS/MOOD: ANXIOUS;IRRITABLE

## 2025-02-18 SDOH — HEALTH STABILITY: MENTAL HEALTH: FOR HIGH RISK PATIENTS: 1:1 PATIENT OBSERVER AT ALL TIMES;ALL INTERVENTIONS ABOVE, PLUS:

## 2025-02-18 SDOH — HEALTH STABILITY: MENTAL HEALTH: NEEDS EXPRESSED: DENIES

## 2025-02-18 ASSESSMENT — COLUMBIA-SUICIDE SEVERITY RATING SCALE - C-SSRS
6. HAVE YOU EVER DONE ANYTHING, STARTED TO DO ANYTHING, OR PREPARED TO DO ANYTHING TO END YOUR LIFE?: YES
5. HAVE YOU STARTED TO WORK OUT OR WORKED OUT THE DETAILS OF HOW TO KILL YOURSELF? DO YOU INTEND TO CARRY OUT THIS PLAN?: YES
4. HAVE YOU HAD THESE THOUGHTS AND HAD SOME INTENTION OF ACTING ON THEM?: YES
1. IN THE PAST MONTH, HAVE YOU WISHED YOU WERE DEAD OR WISHED YOU COULD GO TO SLEEP AND NOT WAKE UP?: YES
2. HAVE YOU ACTUALLY HAD ANY THOUGHTS OF KILLING YOURSELF?: YES
6. HAVE YOU EVER DONE ANYTHING, STARTED TO DO ANYTHING, OR PREPARED TO DO ANYTHING TO END YOUR LIFE?: YES

## 2025-02-18 NOTE — ED PROVIDER NOTES
EMERGENCY DEPARTMENT ENCOUNTER      Pt Name: Shamar Cunningham  MRN: [id number]  Birthdate 1980  Date of evaluation: 2/18/2025    HISTORY OF PRESENT ILLNESS    Shamar Cunningham is an 44 y.o. male with history including hyperlipidemia, hypertension, GERD, polysubstance abuse (cocaine, cannabis, tobacco, opiates, PCP), asthma, schizoaffective, COPD, bipolar presenting to the emergency department for unresponsive.  Patient was found unresponsive laying on a table.  Fire department arrived on scene and was given 4 mg Narcan then a second dose prior to EMS arrival.  EMS gave 2 additional doses of 4 mg Narcan for total of 16 mg of Narcan prior to arrival.  He was still drowsy after last dose of Narcan.  He states that he did take something from somebody but does not know exactly what it was.  Denies any other symptoms including chest pain, fevers, chills, nausea, vomiting, diarrhea, constipation, shortness of breath.      PAST MEDICAL HISTORY     Past Medical History:   Diagnosis Date    Other conditions influencing health status 07/20/2013    Closed Fracture Of Scaphoid Bone    Other conditions influencing health status     Involutional Melancholia (MDD) - Severe, With Psychotic Behavior    Pain in unspecified ankle and joints of unspecified foot     Toe joint pain    Personal history of other diseases of the nervous system and sense organs 07/10/2015    History of carpal tunnel syndrome    Personal history of other specified conditions     History of insomnia       SURGICAL HISTORY       Past Surgical History:   Procedure Laterality Date    OTHER SURGICAL HISTORY  07/10/2015    Wrist Surgery    OTHER SURGICAL HISTORY  07/10/2015    Brain Surgery       CURRENT MEDICATIONS       Previous Medications    ACETAMINOPHEN (TYLENOL) 325 MG TABLET    Take 3 tablets (975 mg) by mouth every 8 hours if needed (pain or fever).    ALBUTEROL 90 MCG/ACTUATION INHALER    Inhale 1 puff every 4 hours if needed for shortness of breath.     BENZTROPINE (COGENTIN) 2 MG TABLET    Take 1 tablet (2 mg) by mouth 2 times a day.    FLUOXETINE (PROZAC) 20 MG CAPSULE    Take 1 capsule (20 mg) by mouth once daily.    MULTIVITAMIN WITH MINERALS TABLET    Take 1 tablet by mouth once daily.    OLANZAPINE (ZYPREXA) 15 MG TABLET    Take 2 tablets (30 mg) by mouth once daily at bedtime.    ROSUVASTATIN (CRESTOR) 20 MG TABLET    Take 1 tablet (20 mg) by mouth once daily at bedtime.       ALLERGIES     Gluten    FAMILY HISTORY       Family History   Family history unknown: Yes        SOCIAL HISTORY       Social History     Socioeconomic History    Marital status: Single   Tobacco Use    Smoking status: Every Day     Types: Cigarettes    Smokeless tobacco: Never   Substance and Sexual Activity    Alcohol use: Yes    Drug use: Yes     Types: PCP, Marijuana    Sexual activity: Defer     Social Drivers of Health     Financial Resource Strain: Low Risk  (1/15/2025)    Overall Financial Resource Strain (CARDIA)     Difficulty of Paying Living Expenses: Not very hard   Food Insecurity: Unknown (1/26/2025)    Received from [facility]    Hunger Vital Sign     Worried About Running Out of Food in the Last Year: Never true   Transportation Needs: No Transportation Needs (1/15/2025)    PRAPARE - Transportation     Lack of Transportation (Medical): No     Lack of Transportation (Non-Medical): No   Physical Activity: Not on File (9/26/2023)    Received from [facility]    Physical Activity     Physical Activity: 0   Recent Concern: Physical Activity - At Risk (9/26/2023)    Received from [facility]    Physical Activity     Physical Activity: 2   Stress: Not on File (9/26/2023)    Received from [facility]    Stress     Stress: 0   Recent Concern: Stress - At Risk (9/26/2023)    Received from [facility]    Stress     Stress: 2   Social Connections: Not on File (9/26/2023)    Received from [facility]    Social Connections     Connectedness: 0   Intimate Partner Violence: Not At Risk  (1/14/2025)    Humiliation, Afraid, Rape, and Kick questionnaire     Fear of Current or Ex-Partner: No     Emotionally Abused: No     Physically Abused: No     Sexually Abused: No   Housing Stability: Low Risk  (1/15/2025)    Housing Stability Vital Sign     Unable to Pay for Housing in the Last Year: No     Number of Times Moved in the Last Year: 0     Homeless in the Last Year: No       PHYSICAL EXAM       ED Triage Vitals [02/18/25 1602]   Temp Heart Rate Respirations BP   -- 59 15 (!) 114/95      Pulse Ox Temp src Heart Rate Source Patient Position   100 % -- Monitor --      BP Location FiO2 (%)     -- --       Physical Exam  Vitals and nursing note reviewed.   Constitutional:       General: He is awake. He is not in acute distress.     Appearance: He is well-developed.   HENT:      Head: Normocephalic and atraumatic.   Eyes:      Comments: Pupils 1 mm bilaterally   Cardiovascular:      Rate and Rhythm: Normal rate and regular rhythm.      Pulses: Normal pulses.      Heart sounds: No murmur heard.  Pulmonary:      Effort: Pulmonary effort is normal. No respiratory distress.      Breath sounds: Normal breath sounds.   Abdominal:      Palpations: Abdomen is soft.      Tenderness: There is no abdominal tenderness.   Musculoskeletal:         General: No swelling.   Skin:     General: Skin is warm and dry.   Neurological:      General: No focal deficit present.      Comments: Alert to self and place   Psychiatric:         Mood and Affect: Mood normal.          DIAGNOSTIC RESULTS     LABS:  Labs Reviewed   BLOOD GAS VENOUS FULL PANEL UNSOLICITED - Abnormal       Result Value    POCT pH, Venous 7.34      POCT pCO2, Venous 48      POCT pO2, Venous 54 (*)     POCT SO2, Venous 84 (*)     POCT Oxy Hemoglobin, Venous 81.2 (*)     POCT Hematocrit Calculated, Venous 43.0      POCT Sodium, Venous 138      POCT Potassium, Venous 3.7      POCT Chloride, Venous 103      POCT Ionized Calicum, Venous 1.26      POCT Glucose, Venous  127 (*)     POCT Lactate, Venous 1.1      POCT Base Excess, Venous -0.5      POCT HCO3 Calculated, Venous 25.9      POCT Hemoglobin, Venous 14.3      POCT Anion Gap, Venous 13.0      Patient Temperature 37.0         All other labs were within normal range or not returned as of this dictation.    Imaging  No orders to display        Procedures  Procedures     EMERGENCY DEPARTMENT COURSE/MDM:   Medical Decision Making  Shamar Cunningham is an 44 y.o. male with history including hyperlipidemia, hypertension, GERD, polysubstance abuse (cocaine, cannabis, tobacco, opiates, PCP), asthma, schizoaffective, COPD, bipolar presenting to the emergency department for unresponsive.  Patient was given 60 mg of Narcan prior to arrival.  Brought back to the medical resuscitation room for immediate evaluation.  Patient appears to be awake though mildly confused.  He does not know what he took but most likely an opiate given the reversal with Narcan.  Patient was also mentioning thoughts of passive suicidal ideation wanting to get help.  Labs ordered and EPAT consult placed    Spoke with EPAT patient does not meet inpatient requirements at this time.    My reevaluation of the patient and he is alert and oriented.  He is more awake.  Patient is requesting services with [facility].  Spoke with [facility] they will be able to place the placement.  Given that he has not had access to any of his medications I have written prescriptions for all of his home meds to be filled at our pharmacy.  Patient will leave the emergency department in the morning.  In the meantime patient will be monitored here by Dr. Rg until discharge.        ED Course as of 02/19/25 1443   Tue Feb 18, 2025   2116 Results reviewed interpret independently.  No major electrolyte abnormalities.  CK normal, thyroid normal.  Acute tox screen negative.  Drug screen pending but not necessary for treatment. [SK]      ED Course User Index  [SK] Erin Louis DO         Diagnoses  as of 02/19/25 1441   Drug abuse (Multi)        External records reviewed: recent inpatient, clinic, and prior ED notes  Labs and Diagnostic imaging independently reviewed/interpreted by me.    Patient plan, care, lab results and imaging were all discussed with attending.    ED Medications administered this visit:  Medications - No data to display  New Prescriptions from this visit:    New Prescriptions    No medications on file       (Please note that portions of this note were completed with a voice recognition program.  Efforts were made to edit the dictations but occasionally words are mis-transcribed.)     Erin Louis DO  Resident  02/19/25 1440

## 2025-02-18 NOTE — ED TRIAGE NOTES
Patient came to ED via EMS after patient was unresponsive at a library. Per EMS pt fell asleep at computer, was not able to be woken up. Fire department arrived on scene and gave pt 4 narcan and he woke up. Patient was ambulatory when EMS arrived. Per EMS pt was drowsy in squad, pt received a total of 16 mg of narcan.    None
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
## 2025-02-19 PROCEDURE — 2500000001 HC RX 250 WO HCPCS SELF ADMINISTERED DRUGS (ALT 637 FOR MEDICARE OP): Mod: SE

## 2025-02-19 PROCEDURE — 2500000005 HC RX 250 GENERAL PHARMACY W/O HCPCS: Mod: SE

## 2025-02-19 RX ORDER — IBUPROFEN 400 MG/1
400 TABLET ORAL ONCE
Status: COMPLETED | OUTPATIENT
Start: 2025-02-19 | End: 2025-02-19

## 2025-02-19 RX ORDER — OXYCODONE HYDROCHLORIDE 5 MG/1
5 TABLET ORAL ONCE
Status: COMPLETED | OUTPATIENT
Start: 2025-02-19 | End: 2025-02-19

## 2025-02-19 RX ORDER — ACETAMINOPHEN 325 MG/1
975 TABLET ORAL ONCE
Status: COMPLETED | OUTPATIENT
Start: 2025-02-19 | End: 2025-02-19

## 2025-02-19 RX ORDER — LIDOCAINE 560 MG/1
1 PATCH PERCUTANEOUS; TOPICAL; TRANSDERMAL ONCE
Status: DISCONTINUED | OUTPATIENT
Start: 2025-02-19 | End: 2025-02-19 | Stop reason: HOSPADM

## 2025-02-19 RX ADMIN — OXYCODONE 5 MG: 5 TABLET ORAL at 05:42

## 2025-02-19 RX ADMIN — LIDOCAINE 4% 1 PATCH: 40 PATCH TOPICAL at 05:42

## 2025-02-19 RX ADMIN — IBUPROFEN 400 MG: 400 TABLET ORAL at 05:42

## 2025-02-19 RX ADMIN — ACETAMINOPHEN 975 MG: 325 TABLET ORAL at 05:42

## 2025-02-19 NOTE — DISCHARGE INSTRUCTIONS
Please follow-up with the resources provided for you.  If you have any new or concerning symptoms return to the ED for reevaluation.

## 2025-02-19 NOTE — ED NOTES
Shamar Cunningham was assessed by a Substance Use Navigator Specialist (SUNS) at 09:00 AM.     Contact Number obtained during encounter: [phone].     Medical History:   Past Medical History:   Diagnosis Date    Other conditions influencing health status 07/20/2013    Closed Fracture Of Scaphoid Bone    Other conditions influencing health status     Involutional Melancholia (MDD) - Severe, With Psychotic Behavior    Pain in unspecified ankle and joints of unspecified foot     Toe joint pain    Personal history of other diseases of the nervous system and sense organs 07/10/2015    History of carpal tunnel syndrome    Personal history of other specified conditions     History of insomnia        Psychiatric History: Bipolar, schizoaffective disorder, borderline personality disorder, psychosis.     Social History:   Social History     Socioeconomic History    Marital status: Single     Spouse name: Not on file    Number of children: Not on file    Years of education: Not on file    Highest education level: Not on file   Occupational History    Not on file   Tobacco Use    Smoking status: Every Day     Types: Cigarettes    Smokeless tobacco: Never   Substance and Sexual Activity    Alcohol use: Yes    Drug use: Yes     Types: PCP, Marijuana    Sexual activity: Defer   Other Topics Concern    Not on file   Social History Narrative    Not on file     Social Drivers of Health     Financial Resource Strain: Low Risk  (1/15/2025)    Overall Financial Resource Strain (CARDIA)     Difficulty of Paying Living Expenses: Not very hard   Food Insecurity: Unknown (1/26/2025)    Received from [facility]    Hunger Vital Sign     Worried About Running Out of Food in the Last Year: Never true     Ran Out of Food in the Last Year: Not on file   Transportation Needs: No Transportation Needs (1/15/2025)    PRAPARE - Transportation     Lack of Transportation (Medical): No     Lack of Transportation (Non-Medical): No   Physical  Activity: Not on File (9/26/2023)    Received from [facility]    Physical Activity     Physical Activity: 0   Recent Concern: Physical Activity - At Risk (9/26/2023)    Received from [facility]    Physical Activity     Physical Activity: 2   Stress: Not on File (9/26/2023)    Received from [facility]    Stress     Stress: 0   Recent Concern: Stress - At Risk (9/26/2023)    Received from [facility]    Stress     Stress: 2   Social Connections: Not on File (9/26/2023)    Received from [facility]    Social Connections     Connectedness: 0   Intimate Partner Violence: Not At Risk (1/14/2025)    Humiliation, Afraid, Rape, and Kick questionnaire     Fear of Current or Ex-Partner: No     Emotionally Abused: No     Physically Abused: No     Sexually Abused: No   Housing Stability: Low Risk  (1/15/2025)    Housing Stability Vital Sign     Unable to Pay for Housing in the Last Year: No     Number of Times Moved in the Last Year: 0     Homeless in the Last Year: No        Substance(s) used:     - Pt arrived in ED yesterday 2/18 unresponsive, perceived post opioid overdose; responsive after 4mg of Narcan, but drowsy; provided 16mg of Narcan total PTA in ED.  - Hx of polysubstance use (cocaine, cannabis, tobacco, opiates, PCP).     Brief Summary of Assessment:     - Med clear via clinical staff.  - Pt awake, denying withdrawal symptoms 2/19, agreeable to speaking w/ SUNS and [facility].   - Pt appropriate and agreeable for inpatient treatment at [facility] facility.     Diagnosis / Diagnostic Impression:     - Polysubstance use.     Summary / Plan:    - SUNS and [facility] coordinated placement to [facility] ([address] [place] [address]).    Patient interested in treatment: Yes    Disposition:     - Discharged.  - [facility]ed to [facility].    Transportation Provided: Yes (transportation provided by [facility]).    Vaibhav Vogt, EMT-Paramedic  [facility] (Substance Use Navigation Specialist)  [email or web address]  Ph: [phone].      Vaibhav  Sin, EMT  02/19/25 113

## 2025-02-19 NOTE — PROGRESS NOTES
Patient was handed off to me from the previous team. For full history, physical, and prior ED course, please see previous provider note prior to patient handoff. This is an addendum to the record.    This is a 44 year old male who was a sign out to me pending Meds to bed.  Nursing informed me that per [facility] pharmacy he is unable to fill his prescriptions until March (he should have prescriptions)   [facility] is working on placement but he is discharged.     Hospital Course/MDM:  Please see the original ED provider note.    Disposition:  Discharge     Deborah Bailey CNP  Emergency Medicine

## 2025-03-09 ENCOUNTER — HOSPITAL ENCOUNTER (EMERGENCY)
Facility: HOSPITAL | Age: 45
Discharge: HOME | End: 2025-03-09
Attending: EMERGENCY MEDICINE
Payer: COMMERCIAL

## 2025-03-09 ENCOUNTER — CLINICAL SUPPORT (OUTPATIENT)
Dept: EMERGENCY MEDICINE | Facility: HOSPITAL | Age: 45
End: 2025-03-09
Payer: COMMERCIAL

## 2025-03-09 VITALS
SYSTOLIC BLOOD PRESSURE: 125 MMHG | RESPIRATION RATE: 16 BRPM | OXYGEN SATURATION: 96 % | HEART RATE: 69 BPM | DIASTOLIC BLOOD PRESSURE: 87 MMHG | TEMPERATURE: 97.5 F

## 2025-03-09 DIAGNOSIS — G89.29 CHRONIC LOW BACK PAIN WITHOUT SCIATICA, UNSPECIFIED BACK PAIN LATERALITY: ICD-10-CM

## 2025-03-09 DIAGNOSIS — R45.89 DEPRESSED MOOD: Primary | ICD-10-CM

## 2025-03-09 DIAGNOSIS — M54.50 CHRONIC LOW BACK PAIN WITHOUT SCIATICA, UNSPECIFIED BACK PAIN LATERALITY: ICD-10-CM

## 2025-03-09 LAB
ALBUMIN SERPL BCP-MCNC: 4.2 G/DL (ref 3.4–5)
ALP SERPL-CCNC: 75 U/L (ref 33–120)
ALT SERPL W P-5'-P-CCNC: 19 U/L (ref 10–52)
ANION GAP SERPL CALC-SCNC: 11 MMOL/L (ref 10–20)
APAP SERPL-MCNC: <10 UG/ML
AST SERPL W P-5'-P-CCNC: 23 U/L (ref 9–39)
BASOPHILS # BLD AUTO: 0.04 X10*3/UL (ref 0–0.1)
BASOPHILS NFR BLD AUTO: 0.6 %
BILIRUB SERPL-MCNC: 0.4 MG/DL (ref 0–1.2)
BUN SERPL-MCNC: 14 MG/DL (ref 6–23)
CALCIUM SERPL-MCNC: 10 MG/DL (ref 8.6–10.6)
CARDIAC TROPONIN I PNL SERPL HS: 4 NG/L (ref 0–53)
CHLORIDE SERPL-SCNC: 103 MMOL/L (ref 98–107)
CO2 SERPL-SCNC: 30 MMOL/L (ref 21–32)
CREAT SERPL-MCNC: 1.11 MG/DL (ref 0.5–1.3)
EGFRCR SERPLBLD CKD-EPI 2021: 84 ML/MIN/1.73M*2
EOSINOPHIL # BLD AUTO: 0.09 X10*3/UL (ref 0–0.7)
EOSINOPHIL NFR BLD AUTO: 1.3 %
ERYTHROCYTE [DISTWIDTH] IN BLOOD BY AUTOMATED COUNT: 13.2 % (ref 11.5–14.5)
ETHANOL SERPL-MCNC: <10 MG/DL
GLUCOSE SERPL-MCNC: 79 MG/DL (ref 74–99)
HCT VFR BLD AUTO: 43 % (ref 41–52)
HGB BLD-MCNC: 15.3 G/DL (ref 13.5–17.5)
IMM GRANULOCYTES # BLD AUTO: 0.01 X10*3/UL (ref 0–0.7)
IMM GRANULOCYTES NFR BLD AUTO: 0.1 % (ref 0–0.9)
LYMPHOCYTES # BLD AUTO: 2.78 X10*3/UL (ref 1.2–4.8)
LYMPHOCYTES NFR BLD AUTO: 40.6 %
MCH RBC QN AUTO: 31.5 PG (ref 26–34)
MCHC RBC AUTO-ENTMCNC: 35.6 G/DL (ref 32–36)
MCV RBC AUTO: 89 FL (ref 80–100)
MONOCYTES # BLD AUTO: 0.55 X10*3/UL (ref 0.1–1)
MONOCYTES NFR BLD AUTO: 8 %
NEUTROPHILS # BLD AUTO: 3.37 X10*3/UL (ref 1.2–7.7)
NEUTROPHILS NFR BLD AUTO: 49.4 %
NRBC BLD-RTO: 0 /100 WBCS (ref 0–0)
PLATELET # BLD AUTO: 313 X10*3/UL (ref 150–450)
POTASSIUM SERPL-SCNC: 3.9 MMOL/L (ref 3.5–5.3)
PROT SERPL-MCNC: 7.6 G/DL (ref 6.4–8.2)
RBC # BLD AUTO: 4.85 X10*6/UL (ref 4.5–5.9)
SALICYLATES SERPL-MCNC: <3 MG/DL
SODIUM SERPL-SCNC: 140 MMOL/L (ref 136–145)
WBC # BLD AUTO: 6.8 X10*3/UL (ref 4.4–11.3)

## 2025-03-09 PROCEDURE — 99285 EMERGENCY DEPT VISIT HI MDM: CPT | Performed by: EMERGENCY MEDICINE

## 2025-03-09 PROCEDURE — 84484 ASSAY OF TROPONIN QUANT: CPT | Performed by: EMERGENCY MEDICINE

## 2025-03-09 PROCEDURE — 80053 COMPREHEN METABOLIC PANEL: CPT

## 2025-03-09 PROCEDURE — 96372 THER/PROPH/DIAG INJ SC/IM: CPT | Performed by: EMERGENCY MEDICINE

## 2025-03-09 PROCEDURE — 93005 ELECTROCARDIOGRAM TRACING: CPT

## 2025-03-09 PROCEDURE — 93010 ELECTROCARDIOGRAM REPORT: CPT | Performed by: EMERGENCY MEDICINE

## 2025-03-09 PROCEDURE — 80320 DRUG SCREEN QUANTALCOHOLS: CPT

## 2025-03-09 PROCEDURE — 36415 COLL VENOUS BLD VENIPUNCTURE: CPT

## 2025-03-09 PROCEDURE — 2500000004 HC RX 250 GENERAL PHARMACY W/ HCPCS (ALT 636 FOR OP/ED): Mod: SE | Performed by: EMERGENCY MEDICINE

## 2025-03-09 PROCEDURE — 85025 COMPLETE CBC W/AUTO DIFF WBC: CPT

## 2025-03-09 RX ORDER — IBUPROFEN 600 MG/1
600 TABLET ORAL EVERY 6 HOURS PRN
Qty: 28 TABLET | Refills: 0 | Status: SHIPPED | OUTPATIENT
Start: 2025-03-09 | End: 2025-03-16

## 2025-03-09 RX ORDER — KETOROLAC TROMETHAMINE 30 MG/ML
30 INJECTION, SOLUTION INTRAMUSCULAR; INTRAVENOUS ONCE
Status: COMPLETED | OUTPATIENT
Start: 2025-03-09 | End: 2025-03-09

## 2025-03-09 RX ORDER — ORPHENADRINE CITRATE 30 MG/ML
60 INJECTION INTRAMUSCULAR; INTRAVENOUS ONCE
Status: COMPLETED | OUTPATIENT
Start: 2025-03-09 | End: 2025-03-09

## 2025-03-09 RX ADMIN — KETOROLAC TROMETHAMINE 30 MG: 30 INJECTION, SOLUTION INTRAMUSCULAR; INTRAVENOUS at 13:53

## 2025-03-09 RX ADMIN — ORPHENADRINE CITRATE 60 MG: 30 INJECTION, SOLUTION INTRAMUSCULAR; INTRAVENOUS at 13:52

## 2025-03-09 SDOH — HEALTH STABILITY: MENTAL HEALTH: IN THE PAST WEEK, HAVE YOU BEEN HAVING THOUGHTS ABOUT KILLING YOURSELF?: YES

## 2025-03-09 SDOH — HEALTH STABILITY: MENTAL HEALTH: ACTIVE SUICIDAL IDEATION WITH SPECIFIC PLAN AND INTENT (PAST 1 MONTH): NO

## 2025-03-09 SDOH — HEALTH STABILITY: MENTAL HEALTH: ACTIVE SUICIDAL IDEATION WITH SOME INTENT TO ACT, WITHOUT SPECIFIC PLAN (PAST 1 MONTH): NO

## 2025-03-09 SDOH — ECONOMIC STABILITY: GENERAL

## 2025-03-09 SDOH — HEALTH STABILITY: MENTAL HEALTH: IN THE PAST FEW WEEKS, HAVE YOU WISHED YOU WERE DEAD?: YES

## 2025-03-09 SDOH — HEALTH STABILITY: MENTAL HEALTH: NON-SPECIFIC ACTIVE SUICIDAL THOUGHTS (PAST 1 MONTH): YES

## 2025-03-09 SDOH — HEALTH STABILITY: MENTAL HEALTH: IN THE PAST FEW WEEKS, HAVE YOU FELT THAT YOU OR YOUR FAMILY WOULD BE BETTER OFF IF YOU WERE DEAD?: NO

## 2025-03-09 SDOH — HEALTH STABILITY: MENTAL HEALTH: HOW DID YOU TRY TO KILL YOURSELF?: HANGING

## 2025-03-09 SDOH — HEALTH STABILITY: MENTAL HEALTH: SUICIDAL BEHAVIOR (3 MONTHS): NO

## 2025-03-09 SDOH — HEALTH STABILITY: MENTAL HEALTH: WHEN DID YOU TRY TO KILL YOURSELF?: MONTHS PRIOR TO CURRENT ED VISIT.

## 2025-03-09 SDOH — HEALTH STABILITY: MENTAL HEALTH: BEHAVIORAL HEALTH(WDL): EXCEPTIONS TO WDL

## 2025-03-09 SDOH — HEALTH STABILITY: MENTAL HEALTH: ARE YOU HAVING THOUGHTS OF KILLING YOURSELF RIGHT NOW?: YES

## 2025-03-09 SDOH — HEALTH STABILITY: MENTAL HEALTH: OTHER SUICIDE PRECAUTIONS INCLUDE: PATIENT PLACED IN PSYCH SAFE ROOM (IF AVAILABLE)

## 2025-03-09 SDOH — HEALTH STABILITY: MENTAL HEALTH
DESCRIBE YOUR THOUGHTS OF KILLING YOURSELF RIGHT NOW:: CONDITIONAL WITH THOUGHTS TO CUT WRISTS DUE TO DESIRE FOR IP PSYCH ADMISSION.

## 2025-03-09 SDOH — HEALTH STABILITY: MENTAL HEALTH: WISH TO BE DEAD (PAST 1 MONTH): YES

## 2025-03-09 SDOH — HEALTH STABILITY: MENTAL HEALTH: SUICIDAL BEHAVIOR (DESCRIPTION): THOUGHTS TO CUT WRISTS.

## 2025-03-09 SDOH — HEALTH STABILITY: MENTAL HEALTH: BEHAVIORS/MOOD: CRYING

## 2025-03-09 SDOH — HEALTH STABILITY: MENTAL HEALTH: SUICIDAL BEHAVIOR (LIFETIME): YES

## 2025-03-09 SDOH — HEALTH STABILITY: MENTAL HEALTH: DEPRESSION SYMPTOMS: IMPAIRED CONCENTRATION;APPETITE CHANGE;SLEEP DISTURBANCE

## 2025-03-09 SDOH — HEALTH STABILITY: MENTAL HEALTH: HAVE YOU EVER TRIED TO KILL YOURSELF?: YES

## 2025-03-09 SDOH — HEALTH STABILITY: MENTAL HEALTH: ANXIETY SYMPTOMS: GENERALIZED

## 2025-03-09 SDOH — ECONOMIC STABILITY: HOUSING INSECURITY: FEELS SAFE LIVING IN HOME: OTHER (COMMENT)

## 2025-03-09 ASSESSMENT — LIFESTYLE VARIABLES
EVER FELT BAD OR GUILTY ABOUT YOUR DRINKING: NO
PRESCIPTION_ABUSE_PAST_12_MONTHS: NO
SUBSTANCE_ABUSE_PAST_12_MONTHS: YES
TOTAL SCORE: 0
EVER HAD A DRINK FIRST THING IN THE MORNING TO STEADY YOUR NERVES TO GET RID OF A HANGOVER: NO
HAVE PEOPLE ANNOYED YOU BY CRITICIZING YOUR DRINKING: NO
HAVE YOU EVER FELT YOU SHOULD CUT DOWN ON YOUR DRINKING: NO

## 2025-03-09 ASSESSMENT — COLUMBIA-SUICIDE SEVERITY RATING SCALE - C-SSRS
6. HAVE YOU EVER DONE ANYTHING, STARTED TO DO ANYTHING, OR PREPARED TO DO ANYTHING TO END YOUR LIFE?: NO
5. HAVE YOU STARTED TO WORK OUT OR WORKED OUT THE DETAILS OF HOW TO KILL YOURSELF? DO YOU INTEND TO CARRY OUT THIS PLAN?: YES
4. HAVE YOU HAD THESE THOUGHTS AND HAD SOME INTENTION OF ACTING ON THEM?: YES
2. HAVE YOU ACTUALLY HAD ANY THOUGHTS OF KILLING YOURSELF?: YES
6. HAVE YOU EVER DONE ANYTHING, STARTED TO DO ANYTHING, OR PREPARED TO DO ANYTHING TO END YOUR LIFE?: YES
1. IN THE PAST MONTH, HAVE YOU WISHED YOU WERE DEAD OR WISHED YOU COULD GO TO SLEEP AND NOT WAKE UP?: YES

## 2025-03-09 ASSESSMENT — PAIN SCALES - GENERAL
PAINLEVEL_OUTOF10: 10 - WORST POSSIBLE PAIN
PAINLEVEL_OUTOF10: 5 - MODERATE PAIN

## 2025-03-09 ASSESSMENT — PAIN DESCRIPTION - LOCATION
LOCATION: BACK
LOCATION: BACK

## 2025-03-09 ASSESSMENT — PAIN - FUNCTIONAL ASSESSMENT: PAIN_FUNCTIONAL_ASSESSMENT: 0-10
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
## 2025-03-09 NOTE — DISCHARGE INSTRUCTIONS
Your back pain was treated with an anti-inflammatory medication, you were seen by psychiatry and were recommended for outpatient follow-up.  Please follow-up with your outpatient provider.  Please return to the ED for any emergent concerns.    [facility]  Phone: [phone]  Address[facility] [address] [address]

## 2025-03-10 LAB
ATRIAL RATE: 61 BPM
P AXIS: 54 DEGREES
P OFFSET: 195 MS
P ONSET: 154 MS
PR INTERVAL: 128 MS
Q ONSET: 218 MS
QRS COUNT: 10 BEATS
QRS DURATION: 72 MS
QT INTERVAL: 360 MS
QTC CALCULATION(BAZETT): 362 MS
QTC FREDERICIA: 361 MS
R AXIS: 27 DEGREES
T AXIS: -7 DEGREES
T OFFSET: 398 MS
VENTRICULAR RATE: 61 BPM

## 2025-03-23 ENCOUNTER — CLINICAL SUPPORT (OUTPATIENT)
Dept: EMERGENCY MEDICINE | Facility: HOSPITAL | Age: 45
End: 2025-03-23
Payer: COMMERCIAL

## 2025-03-23 ENCOUNTER — HOSPITAL ENCOUNTER (EMERGENCY)
Facility: HOSPITAL | Age: 45
Discharge: HOME | End: 2025-03-23
Attending: EMERGENCY MEDICINE
Payer: COMMERCIAL

## 2025-03-23 VITALS
TEMPERATURE: 98.4 F | RESPIRATION RATE: 16 BRPM | HEART RATE: 84 BPM | SYSTOLIC BLOOD PRESSURE: 123 MMHG | OXYGEN SATURATION: 98 % | DIASTOLIC BLOOD PRESSURE: 81 MMHG

## 2025-03-23 DIAGNOSIS — R45.851 SUICIDAL IDEATION: Primary | ICD-10-CM

## 2025-03-23 LAB
ALBUMIN SERPL BCP-MCNC: 4.1 G/DL (ref 3.4–5)
ALP SERPL-CCNC: 69 U/L (ref 33–120)
ALT SERPL W P-5'-P-CCNC: 16 U/L (ref 10–52)
AMPHETAMINES UR QL SCN: ABNORMAL
ANION GAP SERPL CALC-SCNC: 11 MMOL/L (ref 10–20)
APAP SERPL-MCNC: <10 UG/ML
AST SERPL W P-5'-P-CCNC: 20 U/L (ref 9–39)
ATRIAL RATE: 67 BPM
BARBITURATES UR QL SCN: ABNORMAL
BASOPHILS # BLD AUTO: 0.06 X10*3/UL (ref 0–0.1)
BASOPHILS NFR BLD AUTO: 1 %
BENZODIAZ UR QL SCN: ABNORMAL
BILIRUB SERPL-MCNC: 0.3 MG/DL (ref 0–1.2)
BUN SERPL-MCNC: 12 MG/DL (ref 6–23)
BZE UR QL SCN: ABNORMAL
CALCIUM SERPL-MCNC: 9.6 MG/DL (ref 8.6–10.6)
CANNABINOIDS UR QL SCN: ABNORMAL
CHLORIDE SERPL-SCNC: 104 MMOL/L (ref 98–107)
CO2 SERPL-SCNC: 27 MMOL/L (ref 21–32)
CREAT SERPL-MCNC: 1.13 MG/DL (ref 0.5–1.3)
EGFRCR SERPLBLD CKD-EPI 2021: 82 ML/MIN/1.73M*2
EOSINOPHIL # BLD AUTO: 0.06 X10*3/UL (ref 0–0.7)
EOSINOPHIL NFR BLD AUTO: 1 %
ERYTHROCYTE [DISTWIDTH] IN BLOOD BY AUTOMATED COUNT: 12.8 % (ref 11.5–14.5)
ETHANOL SERPL-MCNC: <10 MG/DL
FENTANYL+NORFENTANYL UR QL SCN: ABNORMAL
GLUCOSE SERPL-MCNC: 84 MG/DL (ref 74–99)
HCT VFR BLD AUTO: 42.4 % (ref 41–52)
HGB BLD-MCNC: 15.4 G/DL (ref 13.5–17.5)
IMM GRANULOCYTES # BLD AUTO: 0.02 X10*3/UL (ref 0–0.7)
IMM GRANULOCYTES NFR BLD AUTO: 0.3 % (ref 0–0.9)
LYMPHOCYTES # BLD AUTO: 2.45 X10*3/UL (ref 1.2–4.8)
LYMPHOCYTES NFR BLD AUTO: 42.5 %
MCH RBC QN AUTO: 31.8 PG (ref 26–34)
MCHC RBC AUTO-ENTMCNC: 36.3 G/DL (ref 32–36)
MCV RBC AUTO: 87 FL (ref 80–100)
METHADONE UR QL SCN: ABNORMAL
MONOCYTES # BLD AUTO: 0.37 X10*3/UL (ref 0.1–1)
MONOCYTES NFR BLD AUTO: 6.4 %
NEUTROPHILS # BLD AUTO: 2.8 X10*3/UL (ref 1.2–7.7)
NEUTROPHILS NFR BLD AUTO: 48.8 %
NRBC BLD-RTO: 0 /100 WBCS (ref 0–0)
OPIATES UR QL SCN: ABNORMAL
OXYCODONE+OXYMORPHONE UR QL SCN: ABNORMAL
P AXIS: 78 DEGREES
P OFFSET: 200 MS
P ONSET: 152 MS
PCP UR QL SCN: ABNORMAL
PLATELET # BLD AUTO: 291 X10*3/UL (ref 150–450)
POTASSIUM SERPL-SCNC: 4.1 MMOL/L (ref 3.5–5.3)
PR INTERVAL: 134 MS
PROT SERPL-MCNC: 7.2 G/DL (ref 6.4–8.2)
Q ONSET: 219 MS
QRS COUNT: 12 BEATS
QRS DURATION: 84 MS
QT INTERVAL: 356 MS
QTC CALCULATION(BAZETT): 376 MS
QTC FREDERICIA: 369 MS
R AXIS: 77 DEGREES
RBC # BLD AUTO: 4.85 X10*6/UL (ref 4.5–5.9)
SALICYLATES SERPL-MCNC: <3 MG/DL
SODIUM SERPL-SCNC: 138 MMOL/L (ref 136–145)
T AXIS: 41 DEGREES
T OFFSET: 397 MS
VENTRICULAR RATE: 67 BPM
WBC # BLD AUTO: 5.8 X10*3/UL (ref 4.4–11.3)

## 2025-03-23 PROCEDURE — 80320 DRUG SCREEN QUANTALCOHOLS: CPT | Performed by: PHYSICIAN ASSISTANT

## 2025-03-23 PROCEDURE — 85025 COMPLETE CBC W/AUTO DIFF WBC: CPT | Performed by: PHYSICIAN ASSISTANT

## 2025-03-23 PROCEDURE — 80053 COMPREHEN METABOLIC PANEL: CPT | Performed by: PHYSICIAN ASSISTANT

## 2025-03-23 PROCEDURE — 99285 EMERGENCY DEPT VISIT HI MDM: CPT | Performed by: EMERGENCY MEDICINE

## 2025-03-23 PROCEDURE — 93005 ELECTROCARDIOGRAM TRACING: CPT

## 2025-03-23 PROCEDURE — 36415 COLL VENOUS BLD VENIPUNCTURE: CPT | Performed by: PHYSICIAN ASSISTANT

## 2025-03-23 PROCEDURE — 2500000002 HC RX 250 W HCPCS SELF ADMINISTERED DRUGS (ALT 637 FOR MEDICARE OP, ALT 636 FOR OP/ED): Mod: SE | Performed by: PHYSICIAN ASSISTANT

## 2025-03-23 PROCEDURE — 99285 EMERGENCY DEPT VISIT HI MDM: CPT | Performed by: PHYSICIAN ASSISTANT

## 2025-03-23 PROCEDURE — 93010 ELECTROCARDIOGRAM REPORT: CPT | Performed by: PHYSICIAN ASSISTANT

## 2025-03-23 PROCEDURE — 2500000001 HC RX 250 WO HCPCS SELF ADMINISTERED DRUGS (ALT 637 FOR MEDICARE OP): Mod: SE | Performed by: PHYSICIAN ASSISTANT

## 2025-03-23 PROCEDURE — 80307 DRUG TEST PRSMV CHEM ANLYZR: CPT | Performed by: PHYSICIAN ASSISTANT

## 2025-03-23 RX ORDER — OLANZAPINE 5 MG/1
30 TABLET ORAL ONCE
Status: COMPLETED | OUTPATIENT
Start: 2025-03-23 | End: 2025-03-23

## 2025-03-23 RX ORDER — FLUOXETINE HYDROCHLORIDE 20 MG/1
20 CAPSULE ORAL DAILY
Status: DISCONTINUED | OUTPATIENT
Start: 2025-03-23 | End: 2025-03-23 | Stop reason: HOSPADM

## 2025-03-23 RX ADMIN — OLANZAPINE 30 MG: 5 TABLET, FILM COATED ORAL at 12:42

## 2025-03-23 RX ADMIN — FLUOXETINE HYDROCHLORIDE 20 MG: 20 CAPSULE ORAL at 12:42

## 2025-03-23 SDOH — HEALTH STABILITY: MENTAL HEALTH: SUICIDAL BEHAVIOR (3 MONTHS): NO

## 2025-03-23 SDOH — HEALTH STABILITY: MENTAL HEALTH: IN THE PAST FEW WEEKS, HAVE YOU WISHED YOU WERE DEAD?: NO

## 2025-03-23 SDOH — HEALTH STABILITY: MENTAL HEALTH: ACTIVE SUICIDAL IDEATION WITH SOME INTENT TO ACT, WITHOUT SPECIFIC PLAN (PAST 1 MONTH): NO

## 2025-03-23 SDOH — ECONOMIC STABILITY: GENERAL

## 2025-03-23 SDOH — HEALTH STABILITY: MENTAL HEALTH: IN THE PAST WEEK, HAVE YOU BEEN HAVING THOUGHTS ABOUT KILLING YOURSELF?: YES

## 2025-03-23 SDOH — ECONOMIC STABILITY: HOUSING INSECURITY: FEELS SAFE LIVING IN HOME: YES

## 2025-03-23 SDOH — HEALTH STABILITY: MENTAL HEALTH: IN THE PAST FEW WEEKS, HAVE YOU FELT THAT YOU OR YOUR FAMILY WOULD BE BETTER OFF IF YOU WERE DEAD?: NO

## 2025-03-23 SDOH — HEALTH STABILITY: MENTAL HEALTH: NON-SPECIFIC ACTIVE SUICIDAL THOUGHTS (PAST 1 MONTH): YES

## 2025-03-23 SDOH — HEALTH STABILITY: MENTAL HEALTH: ANXIETY SYMPTOMS: NO PROBLEMS REPORTED OR OBSERVED.

## 2025-03-23 SDOH — HEALTH STABILITY: MENTAL HEALTH: DEPRESSION SYMPTOMS: NO PROBLEMS REPORTED OR OBSERVED.

## 2025-03-23 SDOH — HEALTH STABILITY: MENTAL HEALTH: SUICIDAL BEHAVIOR (LIFETIME): YES

## 2025-03-23 SDOH — HEALTH STABILITY: MENTAL HEALTH: WISH TO BE DEAD (PAST 1 MONTH): NO

## 2025-03-23 SDOH — HEALTH STABILITY: MENTAL HEALTH: ACTIVE SUICIDAL IDEATION WITH SPECIFIC PLAN AND INTENT (PAST 1 MONTH): NO

## 2025-03-23 SDOH — HEALTH STABILITY: MENTAL HEALTH: SUICIDAL BEHAVIOR (DESCRIPTION): HANGING AND MULTIPLE OTHER MEANS.

## 2025-03-23 SDOH — HEALTH STABILITY: MENTAL HEALTH: WHEN DID YOU TRY TO KILL YOURSELF?: MONTHS PRIOR TO CURRENT ED VISIT.

## 2025-03-23 SDOH — HEALTH STABILITY: MENTAL HEALTH: HAVE YOU EVER TRIED TO KILL YOURSELF?: YES

## 2025-03-23 SDOH — HEALTH STABILITY: MENTAL HEALTH: ARE YOU HAVING THOUGHTS OF KILLING YOURSELF RIGHT NOW?: NO

## 2025-03-23 SDOH — HEALTH STABILITY: MENTAL HEALTH: HOW DID YOU TRY TO KILL YOURSELF?: HANGING AND MULTIPLE OTHER ATTEMPTS.

## 2025-03-23 ASSESSMENT — COLUMBIA-SUICIDE SEVERITY RATING SCALE - C-SSRS
4. HAVE YOU HAD THESE THOUGHTS AND HAD SOME INTENTION OF ACTING ON THEM?: NO
1. IN THE PAST MONTH, HAVE YOU WISHED YOU WERE DEAD OR WISHED YOU COULD GO TO SLEEP AND NOT WAKE UP?: NO
5. HAVE YOU STARTED TO WORK OUT OR WORKED OUT THE DETAILS OF HOW TO KILL YOURSELF? DO YOU INTEND TO CARRY OUT THIS PLAN?: NO
2. HAVE YOU ACTUALLY HAD ANY THOUGHTS OF KILLING YOURSELF?: YES
6. HAVE YOU EVER DONE ANYTHING, STARTED TO DO ANYTHING, OR PREPARED TO DO ANYTHING TO END YOUR LIFE?: NO

## 2025-03-23 ASSESSMENT — PAIN - FUNCTIONAL ASSESSMENT: PAIN_FUNCTIONAL_ASSESSMENT: 0-10

## 2025-03-23 ASSESSMENT — LIFESTYLE VARIABLES
SUBSTANCE_ABUSE_PAST_12_MONTHS: YES
PRESCIPTION_ABUSE_PAST_12_MONTHS: NO

## 2025-03-23 ASSESSMENT — PAIN SCALES - GENERAL
PAINLEVEL_OUTOF10: 0 - NO PAIN
PAINLEVEL_OUTOF10: 0 - NO PAIN

## 2025-03-23 NOTE — ED PROVIDER NOTES
Emergency Department Provider Note        History of Present Illness     History provided by: Patient  Limitations to History: None  External Records Reviewed with Brief Summary:  er no 3/9, previous epat and psych notes    HPI:  Shamar Cunningham is a 44 y.o. male with history of schizoaffective disorder, PCP and cocaine abuse, hypertension, hyperlipidemia, homelessness, borderline personality disorder and psychosis who presents today for evaluation of suicidal ideation and hearing voices in his head.  Patient states that he has at home in which she lives independently but states that the last 3 days he has been wandering the streets, when I asked patient why he is doing that he is not sure.  He states that he has access to his home and he would be able to get there by taking a bus he just has not been there and therefore has not been taking his home medications which include his Zyprexa.  Patient states that whenever he goes without his Zyprexa too long he starts hearing voices that are worse than his baseline, patient states that the voices tell him that he is worthless and to walk out in front of a car.  Patient states that he does have suicidal ideation but does not have a plan for how he would do so.  Denies any homicidal ideation.  Denies any delusions.  Patient states he also vomited 3 times yesterday, has not eaten since, denies any current nausea, denies abdominal pain, denies any fevers chills, denies cough congestion or URI symptoms.  Denies chest pain or shortness of breath.    Physical Exam   Triage vitals:  T 37.3 °C (99.1 °F)  HR 88  /88  RR 18  O2 95 % None (Room air)    Physical Exam  Vitals and nursing note reviewed.   Constitutional:       General: He is not in acute distress.     Appearance: Normal appearance. He is not toxic-appearing.   HENT:      Head: Normocephalic and atraumatic.      Nose: Nose normal.   Eyes:      Extraocular Movements: Extraocular movements intact.    Cardiovascular:      Rate and Rhythm: Normal rate and regular rhythm.   Pulmonary:      Effort: Pulmonary effort is normal.      Breath sounds: Normal breath sounds. No wheezing or rhonchi.   Abdominal:      Palpations: Abdomen is soft.      Tenderness: There is no abdominal tenderness. There is no guarding.   Musculoskeletal:         General: Normal range of motion.      Cervical back: Normal range of motion and neck supple.   Skin:     General: Skin is warm and dry.   Neurological:      General: No focal deficit present.      Mental Status: He is alert.   Psychiatric:         Mood and Affect: Mood normal.         Thought Content: Thought content normal.        No orders to display     Labs Reviewed   CBC WITH AUTO DIFFERENTIAL - Abnormal       Result Value    WBC 5.8      nRBC 0.0      RBC 4.85      Hemoglobin 15.4      Hematocrit 42.4      MCV 87      MCH 31.8      MCHC 36.3 (*)     RDW 12.8      Platelets 291      Neutrophils % 48.8      Immature Granulocytes %, Automated 0.3      Lymphocytes % 42.5      Monocytes % 6.4      Eosinophils % 1.0      Basophils % 1.0      Neutrophils Absolute 2.80      Immature Granulocytes Absolute, Automated 0.02      Lymphocytes Absolute 2.45      Monocytes Absolute 0.37      Eosinophils Absolute 0.06      Basophils Absolute 0.06     DRUG SCREEN,URINE - Abnormal    Amphetamine Screen, Urine Presumptive Negative      Barbiturate Screen, Urine Presumptive Negative      Benzodiazepines Screen, Urine Presumptive Negative      Cannabinoid Screen, Urine Presumptive Positive (*)     Cocaine Metabolite Screen, Urine Presumptive Negative      Fentanyl Screen, Urine Presumptive Negative      Opiate Screen, Urine Presumptive Negative      Oxycodone Screen, Urine Presumptive Negative      PCP Screen, Urine Presumptive Positive (*)     Methadone Screen, Urine Presumptive Negative      Narrative:     Drug screen results are presumptive and should not be used to assess   compliance with  prescribed medication. Contact the performing [facility] laboratory   to add-on definitive confirmatory testing if clinically indicated.    Toxicology screening results are reported qualitatively. The concentration must   be greater than or equal to the cutoff to be reported as positive. The concentration   at which the screening test can detect an individual drug or metabolite varies.   The absence of expected drug(s) and/or drug metabolite(s) may indicate non-compliance,   inappropriate timing of specimen collection relative to drug administration, poor drug   absorption, diluted/adulterated urine, or limitations of testing. For medical purposes   only; not valid for forensic use.    Interpretive questions should be directed to the laboratory medical directors.   COMPREHENSIVE METABOLIC PANEL - Normal    Glucose 84      Sodium 138      Potassium 4.1      Chloride 104      Bicarbonate 27      Anion Gap 11      Urea Nitrogen 12      Creatinine 1.13      eGFR 82      Calcium 9.6      Albumin 4.1      Alkaline Phosphatase 69      Total Protein 7.2      AST 20      Bilirubin, Total 0.3      ALT 16     ACUTE TOXICOLOGY PANEL, BLOOD - Normal    Acetaminophen <10.0      Salicylate  <3      Alcohol <10       ED Course as of 25 1642   Sun Mar 23, 2025   1154 Electrocardiogram, 12-lead  NSR rate of 67, , QRS 84, QTc 376, normal axis, no ischemic changes. [MC]      ED Course User Index  [MC] Margarita Angela PA-C         Diagnoses as of 25 1642   Suicidal ideation         Medical Decision Making & ED Course   Medical Decision Makin y.o. male presents today for evaluation of passive suicidal ideation, has been off his medications and is hearing voices increasingly.  Basic labs were obtained, patient to be seen and evaluated by EPAT.  Patient's drug screen positive for cannabinoids and PCP, CMP and tox panel unremarkable, CBC unremarkable, patient received Zyprexa and was requesting to leave however given that  he initially endorsed suicidal ideation we did have a EPAT evaluate him, they cleared him for discharge, he would be stable for discharge at this time.  He states that he has a Zyprexa at home, has a way to get into his house.  ----        ED Course:  ED Course as of 03/23/25 1642   Sun Mar 23, 2025   1154 Electrocardiogram, 12-lead  NSR rate of 67, , QRS 84, QTc 376, normal axis, no ischemic changes. [MC]      ED Course User Index  [MC] Margarita Angela PA-C         Diagnoses as of 03/23/25 1642   Suicidal ideation     Disposition   As a result of the work-up, the patient was discharged home.  he was informed of his diagnosis and instructed to come back with any concerns or worsening of condition.  he and was agreeable to the plan as discussed above.  he was given the opportunity to ask questions.  All of the patient's questions were answered.    Procedures   Procedures    This was a shared visit with an ED attending.  The patient was seen and discussed with the ED attending    Margarita Angela PA-C  Emergency Medicine       Margarita Angela PA-C  03/23/25 1365
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
## 2025-04-07 ENCOUNTER — APPOINTMENT (OUTPATIENT)
Dept: CARDIOLOGY | Facility: HOSPITAL | Age: 45
End: 2025-04-07
Payer: COMMERCIAL

## 2025-04-07 ENCOUNTER — HOSPITAL ENCOUNTER (EMERGENCY)
Facility: HOSPITAL | Age: 45
Discharge: PSYCHIATRIC OR SUBSTANCE USE FACILITY | End: 2025-04-09
Attending: EMERGENCY MEDICINE
Payer: COMMERCIAL

## 2025-04-07 LAB
ALBUMIN SERPL BCP-MCNC: 4.2 G/DL (ref 3.4–5)
ALP SERPL-CCNC: 63 U/L (ref 33–120)
ALT SERPL W P-5'-P-CCNC: 21 U/L (ref 10–52)
ANION GAP SERPL CALC-SCNC: 10 MMOL/L (ref 10–20)
APAP SERPL-MCNC: <10 UG/ML
AST SERPL W P-5'-P-CCNC: 23 U/L (ref 9–39)
BASOPHILS # BLD AUTO: 0.04 X10*3/UL (ref 0–0.1)
BASOPHILS NFR BLD AUTO: 0.6 %
BILIRUB SERPL-MCNC: 0.3 MG/DL (ref 0–1.2)
BUN SERPL-MCNC: 19 MG/DL (ref 6–23)
CALCIUM SERPL-MCNC: 9.7 MG/DL (ref 8.6–10.3)
CHLORIDE SERPL-SCNC: 104 MMOL/L (ref 98–107)
CO2 SERPL-SCNC: 27 MMOL/L (ref 21–32)
CREAT SERPL-MCNC: 1.13 MG/DL (ref 0.5–1.3)
EGFRCR SERPLBLD CKD-EPI 2021: 82 ML/MIN/1.73M*2
EOSINOPHIL # BLD AUTO: 0.08 X10*3/UL (ref 0–0.7)
EOSINOPHIL NFR BLD AUTO: 1.2 %
ERYTHROCYTE [DISTWIDTH] IN BLOOD BY AUTOMATED COUNT: 13.6 % (ref 11.5–14.5)
ETHANOL SERPL-MCNC: <10 MG/DL
GLUCOSE SERPL-MCNC: 101 MG/DL (ref 74–99)
HCT VFR BLD AUTO: 40.8 % (ref 41–52)
HGB BLD-MCNC: 14.5 G/DL (ref 13.5–17.5)
IMM GRANULOCYTES # BLD AUTO: 0.01 X10*3/UL (ref 0–0.7)
IMM GRANULOCYTES NFR BLD AUTO: 0.2 % (ref 0–0.9)
LYMPHOCYTES # BLD AUTO: 2.62 X10*3/UL (ref 1.2–4.8)
LYMPHOCYTES NFR BLD AUTO: 40.9 %
MCH RBC QN AUTO: 31.8 PG (ref 26–34)
MCHC RBC AUTO-ENTMCNC: 35.5 G/DL (ref 32–36)
MCV RBC AUTO: 90 FL (ref 80–100)
MONOCYTES # BLD AUTO: 0.47 X10*3/UL (ref 0.1–1)
MONOCYTES NFR BLD AUTO: 7.3 %
NEUTROPHILS # BLD AUTO: 3.19 X10*3/UL (ref 1.2–7.7)
NEUTROPHILS NFR BLD AUTO: 49.8 %
NRBC BLD-RTO: 0 /100 WBCS (ref 0–0)
PLATELET # BLD AUTO: 268 X10*3/UL (ref 150–450)
POTASSIUM SERPL-SCNC: 4.3 MMOL/L (ref 3.5–5.3)
PROT SERPL-MCNC: 7 G/DL (ref 6.4–8.2)
RBC # BLD AUTO: 4.56 X10*6/UL (ref 4.5–5.9)
SALICYLATES SERPL-MCNC: <3 MG/DL
SODIUM SERPL-SCNC: 137 MMOL/L (ref 136–145)
WBC # BLD AUTO: 6.4 X10*3/UL (ref 4.4–11.3)

## 2025-04-07 PROCEDURE — 80179 DRUG ASSAY SALICYLATE: CPT | Performed by: EMERGENCY MEDICINE

## 2025-04-07 PROCEDURE — 36415 COLL VENOUS BLD VENIPUNCTURE: CPT | Performed by: EMERGENCY MEDICINE

## 2025-04-07 PROCEDURE — 99285 EMERGENCY DEPT VISIT HI MDM: CPT | Performed by: EMERGENCY MEDICINE

## 2025-04-07 PROCEDURE — 80053 COMPREHEN METABOLIC PANEL: CPT | Performed by: EMERGENCY MEDICINE

## 2025-04-07 PROCEDURE — 2500000004 HC RX 250 GENERAL PHARMACY W/ HCPCS (ALT 636 FOR OP/ED)

## 2025-04-07 PROCEDURE — 96372 THER/PROPH/DIAG INJ SC/IM: CPT

## 2025-04-07 PROCEDURE — 93005 ELECTROCARDIOGRAM TRACING: CPT

## 2025-04-07 PROCEDURE — 85025 COMPLETE CBC W/AUTO DIFF WBC: CPT | Performed by: EMERGENCY MEDICINE

## 2025-04-07 RX ORDER — MIDAZOLAM HYDROCHLORIDE 5 MG/ML
INJECTION INTRAMUSCULAR; INTRAVENOUS
Status: COMPLETED
Start: 2025-04-07 | End: 2025-04-07

## 2025-04-07 RX ORDER — HALOPERIDOL LACTATE 5 MG/ML
5 INJECTION, SOLUTION INTRAMUSCULAR ONCE AS NEEDED
Status: COMPLETED | OUTPATIENT
Start: 2025-04-07 | End: 2025-04-07

## 2025-04-07 RX ORDER — MIDAZOLAM HYDROCHLORIDE 5 MG/ML
5 INJECTION INTRAMUSCULAR; INTRAVENOUS ONCE AS NEEDED
Status: COMPLETED | OUTPATIENT
Start: 2025-04-07 | End: 2025-04-09

## 2025-04-07 RX ORDER — HALOPERIDOL LACTATE 5 MG/ML
INJECTION, SOLUTION INTRAMUSCULAR
Status: COMPLETED
Start: 2025-04-07 | End: 2025-04-07

## 2025-04-07 RX ADMIN — MIDAZOLAM 5 MG: 5 INJECTION INTRAMUSCULAR; INTRAVENOUS at 17:31

## 2025-04-07 RX ADMIN — HALOPERIDOL LACTATE 5 MG: 5 INJECTION, SOLUTION INTRAMUSCULAR at 17:31

## 2025-04-07 SDOH — HEALTH STABILITY: MENTAL HEALTH: HAVE YOU BEEN THINKING ABOUT HOW YOU MIGHT DO THIS?: YES

## 2025-04-07 SDOH — HEALTH STABILITY: MENTAL HEALTH: SUICIDE ASSESSMENT: ADULT (C-SSRS)

## 2025-04-07 SDOH — HEALTH STABILITY: MENTAL HEALTH: BEHAVIORAL HEALTH(WDL): EXCEPTIONS TO WDL

## 2025-04-07 SDOH — HEALTH STABILITY: MENTAL HEALTH

## 2025-04-07 SDOH — HEALTH STABILITY: MENTAL HEALTH: HAVE YOU HAD THESE THOUGHTS AND HAD SOME INTENTION OF ACTING ON THEM?: YES

## 2025-04-07 SDOH — HEALTH STABILITY: MENTAL HEALTH: NEEDS EXPRESSED: DENIES

## 2025-04-07 SDOH — HEALTH STABILITY: MENTAL HEALTH: HAVE YOU ACTUALLY HAD ANY THOUGHTS OF KILLING YOURSELF?: YES

## 2025-04-07 SDOH — HEALTH STABILITY: MENTAL HEALTH: HAVE YOU WISHED YOU WERE DEAD OR WISHED YOU COULD GO TO SLEEP AND NOT WAKE UP?: YES

## 2025-04-07 SDOH — HEALTH STABILITY: MENTAL HEALTH: BEHAVIORS/MOOD: NOT INTERACTIVE;NON-COMPLIANT

## 2025-04-07 SDOH — HEALTH STABILITY: MENTAL HEALTH: BEHAVIORS/MOOD: CALM;COOPERATIVE

## 2025-04-07 ASSESSMENT — ENCOUNTER SYMPTOMS
POLYDIPSIA: 0
AGITATION: 1
NUMBNESS: 0
CONFUSION: 0
APNEA: 0
BACK PAIN: 1
ADENOPATHY: 0
CHILLS: 0
DYSURIA: 0
FATIGUE: 0
DECREASED CONCENTRATION: 0
ARTHRALGIAS: 0

## 2025-04-07 ASSESSMENT — PAIN SCALES - GENERAL
PAINLEVEL_OUTOF10: 0 - NO PAIN

## 2025-04-07 ASSESSMENT — PAIN - FUNCTIONAL ASSESSMENT
PAIN_FUNCTIONAL_ASSESSMENT: 0-10
PAIN_FUNCTIONAL_ASSESSMENT: 0-10

## 2025-04-07 NOTE — ED NOTES
Placed two pt belonging bags with labels into locker 3, primary nurse aware     Kira Munoz RN  04/07/25 3900
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
## 2025-04-07 NOTE — ED TRIAGE NOTES
"Pt to the ED accompanied by PD, pt was reportedly at [employer] and left the store with a grocery cart full of things from the store because he \"forgot to pay for it. On the way to residential when pt started complaining of SOB/CP, in the ambulance bay pt then stated that he wanted to die and kill himself, pt has hx of suicide attempts in the past, pt denies HI, pt calm/cooperative in triage.    "

## 2025-04-08 LAB
AMPHETAMINES UR QL SCN: ABNORMAL
ATRIAL RATE: 86 BPM
BARBITURATES UR QL SCN: ABNORMAL
BENZODIAZ UR QL SCN: ABNORMAL
BZE UR QL SCN: ABNORMAL
CANNABINOIDS UR QL SCN: ABNORMAL
FENTANYL+NORFENTANYL UR QL SCN: ABNORMAL
METHADONE UR QL SCN: ABNORMAL
OPIATES UR QL SCN: ABNORMAL
OXYCODONE+OXYMORPHONE UR QL SCN: ABNORMAL
P AXIS: 64 DEGREES
P OFFSET: 211 MS
P ONSET: 161 MS
PCP UR QL SCN: ABNORMAL
PR INTERVAL: 118 MS
Q ONSET: 220 MS
QRS COUNT: 14 BEATS
QRS DURATION: 72 MS
QT INTERVAL: 342 MS
QTC CALCULATION(BAZETT): 409 MS
QTC FREDERICIA: 385 MS
R AXIS: 36 DEGREES
T AXIS: 36 DEGREES
T OFFSET: 391 MS
VENTRICULAR RATE: 86 BPM

## 2025-04-08 PROCEDURE — 80307 DRUG TEST PRSMV CHEM ANLYZR: CPT | Performed by: EMERGENCY MEDICINE

## 2025-04-08 PROCEDURE — 99244 OFF/OP CNSLTJ NEW/EST MOD 40: CPT | Performed by: PSYCHIATRY & NEUROLOGY

## 2025-04-08 PROCEDURE — 2500000001 HC RX 250 WO HCPCS SELF ADMINISTERED DRUGS (ALT 637 FOR MEDICARE OP): Performed by: GENERAL PRACTICE

## 2025-04-08 PROCEDURE — 2500000001 HC RX 250 WO HCPCS SELF ADMINISTERED DRUGS (ALT 637 FOR MEDICARE OP): Performed by: EMERGENCY MEDICINE

## 2025-04-08 RX ORDER — LORAZEPAM 1 MG/1
2 TABLET ORAL ONCE
Status: COMPLETED | OUTPATIENT
Start: 2025-04-08 | End: 2025-04-08

## 2025-04-08 RX ORDER — FLUTICASONE PROPIONATE AND SALMETEROL 100; 50 UG/1; UG/1
1 POWDER RESPIRATORY (INHALATION)
COMMUNITY

## 2025-04-08 RX ORDER — ACETAMINOPHEN 500 MG
500 TABLET ORAL EVERY 8 HOURS PRN
COMMUNITY

## 2025-04-08 RX ORDER — OLANZAPINE 20 MG/1
20 TABLET ORAL NIGHTLY
COMMUNITY
Start: 2025-03-20

## 2025-04-08 RX ORDER — TRAZODONE HYDROCHLORIDE 100 MG/1
100 TABLET ORAL NIGHTLY
COMMUNITY
Start: 2025-03-06

## 2025-04-08 RX ADMIN — LORAZEPAM 2 MG: 1 TABLET ORAL at 09:37

## 2025-04-08 RX ADMIN — LORAZEPAM 2 MG: 1 TABLET ORAL at 17:05

## 2025-04-08 SDOH — HEALTH STABILITY: MENTAL HEALTH: BEHAVIORS/MOOD: SLEEPING

## 2025-04-08 SDOH — HEALTH STABILITY: MENTAL HEALTH: FOR HIGH RISK PATIENTS: 1:1 PATIENT OBSERVER AT ALL TIMES

## 2025-04-08 SDOH — HEALTH STABILITY: MENTAL HEALTH: BEHAVIORAL HEALTH(WDL): WITHIN DEFINED LIMITS

## 2025-04-08 SDOH — HEALTH STABILITY: MENTAL HEALTH: HAVE YOU WISHED YOU WERE DEAD OR WISHED YOU COULD GO TO SLEEP AND NOT WAKE UP?: YES

## 2025-04-08 SDOH — HEALTH STABILITY: MENTAL HEALTH
OTHER SUICIDE PRECAUTIONS INCLUDE: PATIENT PLACED IN AN EASILY OBSERVABLE ROOM WITH DOOR/CURTAIN REMAINING OPEN;TREATMENT PLAN BASED ON RISK FACTORS DEVELOPED (ED ONLY - IF PATIENT IN ED MORE THAN 8 HOURS);HOURLY BEHAVIORAL ASSESSMENT PERFORMED;FREQUENT ROUNDING WITH IRREGULAR CHECKS AT MINIMUM OF EVERY 15 MINUTES TO ASSESS PSYCH SAFETY PERFORMED

## 2025-04-08 SDOH — SOCIAL STABILITY: SOCIAL NETWORK: EMOTIONAL SUPPORT GIVEN: REASSURE

## 2025-04-08 SDOH — HEALTH STABILITY: MENTAL HEALTH: BEHAVIORS/MOOD: CALM

## 2025-04-08 SDOH — HEALTH STABILITY: MENTAL HEALTH: BEHAVIORS/MOOD: AGITATED;UNCOOPERATIVE;AGGRESSIVE VERBALLY, OTHERS;RESTLESS

## 2025-04-08 SDOH — HEALTH STABILITY: MENTAL HEALTH: BEHAVIORAL HEALTH(WDL): EXCEPTIONS TO WDL

## 2025-04-08 SDOH — HEALTH STABILITY: MENTAL HEALTH: FOR HIGH RISK PATIENTS: ALL INTERVENTIONS ABOVE, PLUS:;1:1 PATIENT OBSERVER AT ALL TIMES

## 2025-04-08 SDOH — HEALTH STABILITY: MENTAL HEALTH
OTHER SUICIDE PRECAUTIONS INCLUDE: PATIENT PLACED IN PSYCH SAFE ROOM (IF AVAILABLE);PATIENT PLACED IN AN EASILY OBSERVABLE ROOM WITH DOOR/CURTAIN REMAINING OPEN;FREQUENT ROUNDING WITH IRREGULAR CHECKS AT MINIMUM OF EVERY 15 MINUTES TO ASSESS PSYCH SAFETY PERFORMED;TREATMENT PLAN BASED ON RISK FACTORS DEVELOPED (ED ONLY - IF PATIENT IN ED MORE THAN 8 HOURS)

## 2025-04-08 SDOH — HEALTH STABILITY: MENTAL HEALTH: BEHAVIORS/MOOD: CALM;COOPERATIVE

## 2025-04-08 SDOH — HEALTH STABILITY: MENTAL HEALTH
OTHER SUICIDE PRECAUTIONS INCLUDE: PATIENT PLACED IN AN EASILY OBSERVABLE ROOM WITH DOOR/CURTAIN REMAINING OPEN;PATIENT PLACED IN PSYCH SAFE ROOM (IF AVAILABLE);FREQUENT ROUNDING WITH IRREGULAR CHECKS AT MINIMUM OF EVERY 15 MINUTES TO ASSESS PSYCH SAFETY PERFORMED

## 2025-04-08 SDOH — HEALTH STABILITY: MENTAL HEALTH: BEHAVIORS/MOOD: AGITATED;ANXIOUS;UNCOOPERATIVE;RESTLESS

## 2025-04-08 SDOH — HEALTH STABILITY: MENTAL HEALTH: FOR HIGH RISK PATIENTS: 1:1 PATIENT OBSERVER AT ALL TIMES;ALL INTERVENTIONS ABOVE, PLUS:

## 2025-04-08 SDOH — HEALTH STABILITY: MENTAL HEALTH
OTHER SUICIDE PRECAUTIONS INCLUDE: PATIENT PLACED IN AN EASILY OBSERVABLE ROOM WITH DOOR/CURTAIN REMAINING OPEN;PATIENT PLACED IN PSYCH SAFE ROOM (IF AVAILABLE);HOURLY BEHAVIORAL ASSESSMENT PERFORMED

## 2025-04-08 SDOH — HEALTH STABILITY: MENTAL HEALTH: HAVE YOU ACTUALLY HAD ANY THOUGHTS OF KILLING YOURSELF?: YES

## 2025-04-08 SDOH — HEALTH STABILITY: MENTAL HEALTH: SUICIDE ASSESSMENT: ADULT (C-SSRS)

## 2025-04-08 SDOH — HEALTH STABILITY: MENTAL HEALTH: HAVE YOU BEEN THINKING ABOUT HOW YOU MIGHT DO THIS?: YES

## 2025-04-08 SDOH — HEALTH STABILITY: MENTAL HEALTH
OTHER SUICIDE PRECAUTIONS INCLUDE: PATIENT PLACED IN AN EASILY OBSERVABLE ROOM WITH DOOR/CURTAIN REMAINING OPEN;PATIENT PLACED IN PSYCH SAFE ROOM (IF AVAILABLE);TREATMENT PLAN BASED ON RISK FACTORS DEVELOPED (ED ONLY - IF PATIENT IN ED MORE THAN 8 HOURS);FREQUENT ROUNDING WITH IRREGULAR CHECKS AT MINIMUM OF EVERY 15 MINUTES TO ASSESS PSYCH SAFETY PERFORMED

## 2025-04-08 SDOH — HEALTH STABILITY: MENTAL HEALTH: HALLUCINATION: VISUAL

## 2025-04-08 SDOH — HEALTH STABILITY: MENTAL HEALTH: NEEDS EXPRESSED: DENIES

## 2025-04-08 ASSESSMENT — PAIN SCALES - GENERAL: PAINLEVEL_OUTOF10: 0 - NO PAIN

## 2025-04-08 NOTE — ED NOTES
This RN notified pharmacy tech to complete med rec for this  patient      Eleni Skiffey, RN  04/08/25 9310

## 2025-04-08 NOTE — ED NOTES
Patient requesting this RN to call his assisted living and provide them with an update on his status and POC. This RN spoke with Donta Gordon from patient's assisted living and provided her with an update on patient per patient request. She states she will let the patient's  know.      Eleni Skiffey, RN  04/08/25 0914

## 2025-04-08 NOTE — ED NOTES
This RN spoke with Dr. Jose at this time and provided update on patient. Pt now on face to face call with dr. Jose. Pt calm and cooperative     Eleni Skiffey, RN  04/08/25 [id number]

## 2025-04-08 NOTE — PROGRESS NOTES
04/08/25 1437   Discharge Planning   Home or Post Acute Services Community services   Expected Discharge Disposition Psych  (accepted @ [facility])   Does the patient need discharge transport arranged? Yes   RoundTrip coordination needed? Yes   Has discharge transport been arranged? Yes   What day is the transport expected? 04/08/25   What time is the transport expected? 1500
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
## 2025-04-08 NOTE — ED PROVIDER NOTES
HPI   Chief Complaint   Patient presents with    Psychiatric Evaluation       This is a 44-year-old man with past medical history of polysubstance abuse, bipolar, schizoaffective disorder, Borderline personality disorder does present with complaint of worsening psychosis.  Apparently was arrested by police for shoplifting and then did go to intermediate and then started acting abnormally was brought to the ED for further evaluation.  Does endorse some SI.  Positive psychosis.            Patient History   Past Medical History:   Diagnosis Date    Other conditions influencing health status 07/20/2013    Closed Fracture Of Scaphoid Bone    Other conditions influencing health status     Involutional Melancholia (MDD) - Severe, With Psychotic Behavior    Pain in unspecified ankle and joints of unspecified foot     Toe joint pain    Personal history of other diseases of the nervous system and sense organs 07/10/2015    History of carpal tunnel syndrome    Personal history of other specified conditions     History of insomnia     Past Surgical History:   Procedure Laterality Date    OTHER SURGICAL HISTORY  07/10/2015    Wrist Surgery    OTHER SURGICAL HISTORY  07/10/2015    Brain Surgery     Family History   Family history unknown: Yes     Social History     Tobacco Use    Smoking status: Every Day     Types: Cigarettes    Smokeless tobacco: Never   Substance Use Topics    Alcohol use: Yes    Drug use: Yes     Types: PCP, Marijuana       Physical Exam   ED Triage Vitals [04/07/25 1719]   Temperature Heart Rate Respirations BP   36.7 °C (98 °F) 94 14 (!) 133/94      Pulse Ox Temp Source Heart Rate Source Patient Position   98 % Temporal -- --      BP Location FiO2 (%)     -- --       Physical Exam  Vitals and nursing note reviewed.   Constitutional:       General: He is not in acute distress.     Appearance: Normal appearance. He is normal weight. He is not ill-appearing.   HENT:      Head: Normocephalic and atraumatic.      Nose:  Nose normal.      Mouth/Throat:      Mouth: Mucous membranes are moist.      Pharynx: Oropharynx is clear.   Eyes:      Extraocular Movements: Extraocular movements intact.      Conjunctiva/sclera: Conjunctivae normal.      Pupils: Pupils are equal, round, and reactive to light.   Cardiovascular:      Rate and Rhythm: Normal rate and regular rhythm.      Pulses: Normal pulses.      Heart sounds: Normal heart sounds.   Pulmonary:      Effort: Pulmonary effort is normal.      Breath sounds: Normal breath sounds. No stridor. No wheezing.   Abdominal:      General: Abdomen is flat. Bowel sounds are normal. There is no distension.      Palpations: Abdomen is soft.      Tenderness: There is no abdominal tenderness. There is no right CVA tenderness, left CVA tenderness, guarding or rebound.   Musculoskeletal:         General: No tenderness or deformity. Normal range of motion.      Cervical back: Normal range of motion and neck supple.   Skin:     General: Skin is warm and dry.      Capillary Refill: Capillary refill takes less than 2 seconds.      Coloration: Skin is not pale.      Findings: No bruising.   Neurological:      General: No focal deficit present.      Mental Status: He is alert and oriented to person, place, and time. Mental status is at baseline.      Sensory: No sensory deficit.      Motor: No weakness.   Psychiatric:      Comments: Suicidal and psychotic           ED Course & MDM                  No data recorded     [place] Coma Scale Score: 15 (04/07/25 1933 : Thom Fatiam RN)                           Medical Decision Making  EKG interpreted by me showed normal sinus rhythm at a rate of 86 with no acute ischemic changes.  No STEMI.  No A-fib.  Psych labs were obtained including CBC and CMP.  No acute kidney injury.  No metabolic anion gap acidosis.  No electrolyte abnormalities.  Dialysis laden alcohol screens were negative.  Patient did receive Haldol 5 mg midazolam 5 mg IM due to acute agitation  with significant improvement.  EPAT is consulted for further evaluation given suicidal thoughts and psychotic episode patient is medically cleared for psychiatric evaluation and placement    Amount and/or Complexity of Data Reviewed  Labs: ordered. Decision-making details documented in ED Course.  Radiology: ordered. Decision-making details documented in ED Course.  ECG/medicine tests: ordered. Decision-making details documented in ED Course.        Procedure  Procedures     Haider Avitia MD  04/07/25 5124

## 2025-04-08 NOTE — SIGNIFICANT EVENT
Application for Emergency Admission      Ready for Transfer?  Is the patient medically cleared for transfer to inpatient psychiatry: Yes  Has the patient been accepted to an inpatient psychiatric hospital: Yes    Application for Emergency Admission  IN ACCORDANCE WITH SE[place] 5122.10 [place]  The Chief Clinical Officer of: [facility] 4/8/2025 .3:55 PM    Reason for Hospitalization  The undersigned has reason to believe that: Shamar Cunningham Is a mentally ill person subject to hospitalization by court order under division B Section 5122.01 of the Revised Code, i.e., this person:    1.No  Represents a substantial risk of physical harm to self as manifested by evidence of threats of, or attempts at, suicide or serious self-inflicted bodily harm    2.No Represents a substantial risk of physical harm to others as manifested by evidence of recent homicidal or other violent behavior, evidence of recent threats that place another in reasonable fear of violent behavior and serious physical harm, or other evidence of present dangerousness    3.Yes Represents a substantial and immediate risk of serious physical impairment or injury to self as manifested by  evidence that the person is unable to provide for and is not providing for the person's basic physical needs because of the person's mental illness and that appropriate provision for those needs cannot be made  immediately available in the community    4.No Would benefit from treatment in a hospital for his mental illness and is in need of such treatment as manifested by evidence of behavior that creates a grave and imminent risk to substantial rights of others or  himself.    5.No Would benefit from treatment as manifested by evidence of behavior that indicates all of the following:       (a) The person is unlikely to survive safely in the community without supervision, based on a clinical determination.       (b) The person has a history of lack of compliance with  treatment for mental illness and one of the following applies:      (i) At least twice within the thirty-six months prior to the filing of an affidavit seeking court-ordered treatment of the person under section 5122.111 of the Revised Code, the lack of compliance has been a significant factor in necessitating hospitalization in a hospital or receipt of services in a forensic or other mental health unit of a correctional facility, provided that the thirty-six-month period shall be extended by the length of any hospitalization or incarceration of the person that occurred within the thirty-six-month period.      (ii) Within the forty-eight months prior to the filing of an affidavit seeking court-ordered treatment of the person under section 5122.111 of the Revised Code, the lack of compliance resulted in one or more acts of serious violent behavior toward self or others or threats of, or attempts at, serious physical harm to self or others, provided that the forty-eight-month period shall be extended by the length of any hospitalization or incarceration of the person that occurred within the forty-eight-month period.      (c) The person, as a result of mental illness, is unlikely to voluntarily participate in necessary treatment.       (d) In view of the person's treatment history and current behavior, the person is in need of treatment in order to prevent a relapse or deterioration that would be likely to result in substantial risk of serious harm to the person or others.    (e) Represents a substantial risk of physical harm to self or others if allowed to remain at liberty pending examination.    Therefore, it is requested that said person be admitted to the above named facility.    STATEMENT OF BELIEF    Must be filled out by one of the following: a psychiatrist, licensed physician, licensed clinical psychologist, health or ,  or .  (Statement shall include the circumstances under  which the individual was taken into custody and the reason for the person's belief that hospitalization is necessary. The statement shall also include a reference to efforts made to secure the individual's property at his residence if he was taken into custody there. Every reasonable and appropriate effort should be made to take this person into custody in the least conspicuous manner possible.)    The patient is exhibiting worsening psychosis due to his bipolar disorder.  He has been evaluated by [facility] and determined to require psychiatric hospitalization    Jonathan Lambert DO 4/8/2025     _____________________________________________________________   Place of Employment: [facility] [employer]    STATEMENT OF OBSERVATION BY PSYCHIATRIST, LICENSED PHYSICIAN, OR LICENSED CLINICAL PSYCHOLOGIST, IF APPLICABLE    Place of Observation (e.g., [facility] mental [facility] center, general hospital, office, emergency facility)  (If applicable, please complete)    Jonathan Lambert DO 4/8/2025    _____________________________________________________________

## 2025-04-08 NOTE — ED NOTES
Patient requesting something to help him sleep. Dr. PARMAR notified.      Eleni Skiffey, RN  04/08/25 [id number]

## 2025-04-08 NOTE — ED NOTES
"Pt told this RN that \"I need serious help...if they don't place me somewhere and I have to go home, I'm going to kill myself\". Pt visibly upset, RN assured pt that we would not let anything bad happen to him and [facility] would assess him appropriately. Pt appreciative of RNs discussion.      Thom Fatima RN  04/08/25 0636    "
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
## 2025-04-08 NOTE — PROGRESS NOTES
Pharmacy Medication History     Source of Information: Per [facility] pharmacy     Additional concerns with the patient's PTA list.   Advair was last filled in feb 2025 for 30 days per pharmacy. So may be  taking prn     Notified Provider via [facility] : No    The following updates were made to the Prior to Admission medication list:     Medications ADDED:   Advair   Tylenol 500mg  Medications CHANGED:  N/a  Medications REMOVED:   N/a  Medications NOT TAKING:   Tylenol 325  Cogentin     Allergy reviewed : No    Meds 2 Beds : N/A    Outpatient pharmacy confirmed and updated in chart : Yes    Pharmacy name: [facility] pharmacy     The list below reflectives the updated PTA list. Please review each medication in order reconciliation for additional clarification and justification.    Prior to Admission Medications   Prescriptions Last Dose   FLUoxetine (PROzac) 20 mg capsule    Sig: Take 1 capsule (20 mg) by mouth once daily.   OLANZapine (ZyPREXA) 20 mg tablet    Sig: Take 1 tablet (20 mg) by mouth once daily at bedtime.             acetaminophen (Tylenol) 500 mg tablet    Sig: Take 1 tablet (500 mg) by mouth every 8 hours if needed for mild pain (1 - 3).   albuterol 90 mcg/actuation inhaler    Sig: Inhale 1 puff every 4 hours if needed for shortness of breath.             fluticasone propion-salmeteroL (Advair Diskus) 100-50 mcg/dose diskus inhaler    Sig: Inhale 1 puff 2 times a day. Rinse mouth with water after use to reduce aftertaste and incidence of candidiasis. Do not swallow.   multivitamin with minerals tablet    Sig: Take 1 tablet by mouth once daily.   rosuvastatin (Crestor) 20 mg tablet    Sig: Take 1 tablet (20 mg) by mouth once daily at bedtime.   traZODone (Desyrel) 100 mg tablet    Sig: Take 1 tablet (100 mg) by mouth once daily at bedtime.      Facility-Administered Medications: None       The list below reflectives the updated allergy list. Please review each documented allergy for  additional clarification and justification.    No Known Allergies       04/08/25 at 1:34 PM - Estephania Washington

## 2025-04-08 NOTE — ED NOTES
Patient requesting this RN to place a call to patient's mother. This RN called x2 with no answer. Patient notified.      Eleni Skiffey, RN  04/08/25 0948

## 2025-04-08 NOTE — ED NOTES
Patient placed back in psych safe room. All needs, questions, and concerns addressed. Walker removed from patient room. Patient lays on bed, in NAD. Resting quietly. Safety sitter remains at bedside          Eleni Skiffey, RN  04/08/25 0807

## 2025-04-08 NOTE — ED NOTES
Pt cooperative but is displaying signs of increased anxiety at this time and is shouting from room. Warm blanket offered, quiet environment provided, comfort food offered. Pt not responding to de-escalation techniques and non-pharmacological methods of reducing anxiety.  Dr. Lambert notified at this time           Eleni Skiffey, RN  04/08/25 0325

## 2025-04-09 VITALS
DIASTOLIC BLOOD PRESSURE: 83 MMHG | SYSTOLIC BLOOD PRESSURE: 132 MMHG | WEIGHT: 200 LBS | BODY MASS INDEX: 28 KG/M2 | OXYGEN SATURATION: 96 % | TEMPERATURE: 97.6 F | RESPIRATION RATE: 16 BRPM | HEART RATE: 89 BPM | HEIGHT: 71 IN

## 2025-04-09 PROCEDURE — 2500000004 HC RX 250 GENERAL PHARMACY W/ HCPCS (ALT 636 FOR OP/ED): Performed by: EMERGENCY MEDICINE

## 2025-04-09 PROCEDURE — 96372 THER/PROPH/DIAG INJ SC/IM: CPT | Performed by: EMERGENCY MEDICINE

## 2025-04-09 RX ORDER — HALOPERIDOL LACTATE 5 MG/ML
5 INJECTION, SOLUTION INTRAMUSCULAR ONCE
Status: COMPLETED | OUTPATIENT
Start: 2025-04-09 | End: 2025-04-09

## 2025-04-09 RX ADMIN — HALOPERIDOL LACTATE 5 MG: 5 INJECTION, SOLUTION INTRAMUSCULAR at 05:21

## 2025-04-09 RX ADMIN — MIDAZOLAM 5 MG: 5 INJECTION INTRAMUSCULAR; INTRAVENOUS at 05:21

## 2025-04-09 SDOH — HEALTH STABILITY: MENTAL HEALTH
OTHER SUICIDE PRECAUTIONS INCLUDE: PATIENT PLACED IN AN EASILY OBSERVABLE ROOM WITH DOOR/CURTAIN REMAINING OPEN;PATIENT PLACED IN GOWN (SNAPS OR PAPER GOWNS PREFERRED) AND WANDED;REMAINING RISKS IDENTIFIED AND MITIGATED;PATIENT PLACED IN PSYCH SAFE ROOM (IF AVAILABLE);PROVIDER NOTIFIED;ELOPEMENT RISK IDENTIFIED;FAMILY/VISITOR ADVISED TO MAINTAIN CONTROL OF OWN PERSONAL BELONGINGS IN ROOM;FREQUENT ROUNDING WITH IRREGULAR CHECKS AT MINIMUM OF EVERY 15 MINUTES TO ASSESS PSYCH SAFETY PERFORMED;HOME MEDICATION LIST COLLECTED AND SHARED WITH PROVIDER;HOURLY BEHAVIORAL ASSESSMENT PERFORMED;PERSONAL BELONGINGS SECURED;TREATMENT PLAN BASED ON RISK FACTORS DEVELOPED (ED ONLY - IF PATIENT IN ED MORE THAN 8 HOURS);VISITORS LIMITED WHEN NECESSARY AND PERSONAL ITEMS SCREENED

## 2025-04-09 SDOH — SOCIAL STABILITY: SOCIAL INSECURITY: FAMILY BEHAVIORS: UNABLE TO ASSESS

## 2025-04-09 SDOH — HEALTH STABILITY: MENTAL HEALTH: HALLUCINATION: AUDITORY;COMMAND;VISUAL

## 2025-04-09 SDOH — HEALTH STABILITY: MENTAL HEALTH: NEEDS EXPRESSED: PHYSICAL;FINANCIAL;EMOTIONAL

## 2025-04-09 SDOH — SOCIAL STABILITY: SOCIAL NETWORK: PARENT/GUARDIAN/SIGNIFICANT OTHER INVOLVEMENT: NO INVOLVEMENT

## 2025-04-09 SDOH — HEALTH STABILITY: MENTAL HEALTH: HAVE YOU HAD THESE THOUGHTS AND HAD SOME INTENTION OF ACTING ON THEM?: YES

## 2025-04-09 SDOH — HEALTH STABILITY: MENTAL HEALTH: CONTENT: DELUSIONS

## 2025-04-09 SDOH — SOCIAL STABILITY: SOCIAL NETWORK: EMOTIONAL SUPPORT GIVEN: REASSURE

## 2025-04-09 SDOH — HEALTH STABILITY: MENTAL HEALTH
BEHAVIORS/MOOD: CALM;DELUSIONS;APPROPRIATE FOR SITUATION;APPROPRIATE FOR AGE;COOPERATIVE;FLAT AFFECT;VERBAL;CONGRUENT;HALLUCINATIONS;LABILE

## 2025-04-09 SDOH — HEALTH STABILITY: MENTAL HEALTH: BEHAVIORAL HEALTH(WDL): EXCEPTIONS TO WDL

## 2025-04-09 SDOH — HEALTH STABILITY: MENTAL HEALTH: HAVE YOU BEEN THINKING ABOUT HOW YOU MIGHT DO THIS?: YES

## 2025-04-09 SDOH — SOCIAL STABILITY: SOCIAL NETWORK: VISITOR BEHAVIORS: UNABLE TO ASSESS

## 2025-04-09 SDOH — HEALTH STABILITY: MENTAL HEALTH: RISK OF SUICIDE: HIGH RISK

## 2025-04-09 SDOH — HEALTH STABILITY: MENTAL HEALTH: DELUSIONS: PARANOID;PERSECUTORY

## 2025-04-09 SDOH — HEALTH STABILITY: MENTAL HEALTH: HAVE YOU ACTUALLY HAD ANY THOUGHTS OF KILLING YOURSELF?: YES

## 2025-04-09 SDOH — HEALTH STABILITY: MENTAL HEALTH: SLEEP PATTERN: DISTURBED/INTERRUPTED SLEEP;INSOMNIA;RESTLESSNESS;DIFFICULTY FALLING ASLEEP

## 2025-04-09 SDOH — HEALTH STABILITY: MENTAL HEALTH: HAVE YOU EVER DONE ANYTHING, STARTED TO DO ANYTHING, OR PREPARED TO DO ANYTHING TO END YOUR LIFE?: YES

## 2025-04-09 SDOH — HEALTH STABILITY: MENTAL HEALTH: FOR HIGH RISK PATIENTS: ALL INTERVENTIONS ABOVE, PLUS:;1:1 PATIENT OBSERVER AT ALL TIMES

## 2025-04-09 SDOH — HEALTH STABILITY: MENTAL HEALTH: SUICIDE ASSESSMENT: ADULT (C-SSRS)

## 2025-04-09 SDOH — HEALTH STABILITY: MENTAL HEALTH: HAVE YOU WISHED YOU WERE DEAD OR WISHED YOU COULD GO TO SLEEP AND NOT WAKE UP?: YES

## 2025-04-09 SDOH — HEALTH STABILITY: MENTAL HEALTH
BEHAVIORS/MOOD: CALM;DELUSIONS;SLEEPING;APPROPRIATE FOR SITUATION;APPROPRIATE FOR AGE;COOPERATIVE;FLAT AFFECT;VERBAL;CONGRUENT;HALLUCINATIONS;LABILE

## 2025-04-09 SDOH — HEALTH STABILITY: MENTAL HEALTH: WAS THIS WITHIN THE PAST THREE MONTHS?: NO

## 2025-04-09 ASSESSMENT — PAIN SCALES - GENERAL
PAINLEVEL_OUTOF10: 0 - NO PAIN
PAINLEVEL_OUTOF10: 0 - NO PAIN

## 2025-04-09 ASSESSMENT — PAIN DESCRIPTION - ORIENTATION: ORIENTATION: LOWER

## 2025-04-09 ASSESSMENT — PAIN - FUNCTIONAL ASSESSMENT: PAIN_FUNCTIONAL_ASSESSMENT: 0-10

## 2025-04-09 ASSESSMENT — PAIN DESCRIPTION - PAIN TYPE: TYPE: CHRONIC PAIN

## 2025-04-09 ASSESSMENT — PAIN DESCRIPTION - PROGRESSION: CLINICAL_PROGRESSION: NOT CHANGED

## 2025-04-09 ASSESSMENT — PAIN DESCRIPTION - FREQUENCY: FREQUENCY: CONSTANT/CONTINUOUS

## 2025-04-09 ASSESSMENT — PAIN DESCRIPTION - DESCRIPTORS: DESCRIPTORS: ACHING;SORE

## 2025-04-09 ASSESSMENT — PAIN DESCRIPTION - LOCATION: LOCATION: BACK

## 2025-04-09 ASSESSMENT — PAIN DESCRIPTION - ONSET: ONSET: ONGOING

## 2025-04-09 ASSESSMENT — ACTIVITIES OF DAILY LIVING (ADL): EFFECT OF PAIN ON DAILY ACTIVITIES: DECREASED

## 2025-04-09 NOTE — PROGRESS NOTES
Lokes[facility] Saenz can take patient this AM.     04/09/25 0743   Discharge Planning   Home or Post Acute Services Community services   Expected Discharge Disposition Psych  (Dian Saenz)   Does the patient need discharge transport arranged? Yes   RoundTrip coordination needed? Yes   Has discharge transport been arranged? Yes   What day is the transport expected? 04/09/25   What time is the transport expected? 0900

## 2025-04-09 NOTE — ED NOTES
Pt resting now after receiving 5mg of versed and 5 mg of haldol       Raman Frank RN  04/09/25 0602

## 2025-04-09 NOTE — ED NOTES
"Pt awake in bed requesting \"percocet and sleeping pills\". Pt also requesting to use the phone. Pt educated that it was 4 am and he may use the phone in the AM but 4 am is not appropriate times for phone calls.      Sylvia Martinez RN  04/09/25 0426    "

## 2025-04-09 NOTE — ED NOTES
Pt transported to [facility] by [facility], Squad 1, ground ambulance, w/ EMT-Bx2 on EMS stretcher with safety/fall precautions, mask on, belongings, paperwork. No significant detrimental changes prior to transfer. Neuro/skin/respiratory grossly WDL.     Report given to CARLOS A Kelly-B, VIKAS Squad 1    Report given to Audra, nurse at [facility].     Ashvin Goyal RN  04/09/25 0888

## 2025-04-09 NOTE — ED NOTES
Pt requested to have an x ray. Pt had no event that would require an x ray. Pt then requested to go to the bathroom. This nurse let cart railing down so pt could walk to the bathroom. Pt had been using walking with out any issues. Pt then proceeded to lower self to ground as a fall pt then said now I need an x ray . Pt using abusive behavior to staff because pt is harm to himself pt bed is being removed and mattress is going on the floor Dr. Beal made aware and has ordered meds so pt could relax,      Raman Frank RN  04/09/25 9983

## 2025-04-09 NOTE — ED NOTES
Pt provided sandwich, snacks and drinks. Pt opened sandwich and took bite then accused nurse of taking bite of sandwich before given to patient. Pt educated on that not happening and continued with his food. Pt remains within 1:1 sitter. Pt to be transferred to facility today.      Sylvia Martinez RN  04/09/25 0425